# Patient Record
Sex: MALE | Race: BLACK OR AFRICAN AMERICAN | NOT HISPANIC OR LATINO | Employment: UNEMPLOYED | URBAN - METROPOLITAN AREA
[De-identification: names, ages, dates, MRNs, and addresses within clinical notes are randomized per-mention and may not be internally consistent; named-entity substitution may affect disease eponyms.]

---

## 2023-04-24 ENCOUNTER — APPOINTMENT (OUTPATIENT)
Dept: LAB | Facility: LAB | Age: 53
End: 2023-04-24
Payer: COMMERCIAL

## 2023-04-24 LAB
ALBUMIN (G/DL) IN SER/PLAS: 4.8 G/DL (ref 3.4–5)
ANION GAP IN SER/PLAS: 16 MMOL/L (ref 10–20)
CALCIUM (MG/DL) IN SER/PLAS: 9.6 MG/DL (ref 8.6–10.6)
CARBON DIOXIDE, TOTAL (MMOL/L) IN SER/PLAS: 29 MMOL/L (ref 21–32)
CHLORIDE (MMOL/L) IN SER/PLAS: 99 MMOL/L (ref 98–107)
CHOLESTEROL (MG/DL) IN SER/PLAS: 202 MG/DL (ref 0–199)
CHOLESTEROL IN HDL (MG/DL) IN SER/PLAS: 39 MG/DL
CHOLESTEROL/HDL RATIO: 5.2
CREATININE (MG/DL) IN SER/PLAS: 1.22 MG/DL (ref 0.5–1.3)
CREATININE (MG/DL) IN URINE: 190 MG/DL (ref 20–370)
ERYTHROCYTE DISTRIBUTION WIDTH (RATIO) BY AUTOMATED COUNT: 13.4 % (ref 11.5–14.5)
ERYTHROCYTE MEAN CORPUSCULAR HEMOGLOBIN CONCENTRATION (G/DL) BY AUTOMATED: 33.7 G/DL (ref 32–36)
ERYTHROCYTE MEAN CORPUSCULAR VOLUME (FL) BY AUTOMATED COUNT: 87 FL (ref 80–100)
ERYTHROCYTES (10*6/UL) IN BLOOD BY AUTOMATED COUNT: 5.49 X10E12/L (ref 4.5–5.9)
ESTIMATED AVERAGE GLUCOSE FOR HBA1C: 126 MG/DL
GFR MALE: 72 ML/MIN/1.73M2
GLUCOSE (MG/DL) IN SER/PLAS: 87 MG/DL (ref 74–99)
HEMATOCRIT (%) IN BLOOD BY AUTOMATED COUNT: 47.5 % (ref 41–52)
HEMOGLOBIN (G/DL) IN BLOOD: 16 G/DL (ref 13.5–17.5)
HEMOGLOBIN A1C/HEMOGLOBIN TOTAL IN BLOOD: 6 %
LEUKOCYTES (10*3/UL) IN BLOOD BY AUTOMATED COUNT: 4.9 X10E9/L (ref 4.4–11.3)
NON-HDL CHOLESTEROL: 163 MG/DL
NRBC (PER 100 WBCS) BY AUTOMATED COUNT: 0 /100 WBC (ref 0–0)
PHOSPHATE (MG/DL) IN SER/PLAS: 3.7 MG/DL (ref 2.5–4.9)
PLATELETS (10*3/UL) IN BLOOD AUTOMATED COUNT: 246 X10E9/L (ref 150–450)
POTASSIUM (MMOL/L) IN SER/PLAS: 4.3 MMOL/L (ref 3.5–5.3)
PROTEIN (MG/DL) IN URINE: 17 MG/DL (ref 5–25)
PROTEIN/CREATININE (MG/MG) IN URINE: 0.09 MG/MG CREAT (ref 0–0.17)
SODIUM (MMOL/L) IN SER/PLAS: 140 MMOL/L (ref 136–145)
URATE (MG/DL) IN SER/PLAS: 9.4 MG/DL (ref 4–7.5)
UREA NITROGEN (MG/DL) IN SER/PLAS: 20 MG/DL (ref 6–23)

## 2023-06-13 LAB
ALBUMIN (MG/L) IN URINE: 20.9 MG/L
ALBUMIN/CREATININE (UG/MG) IN URINE: 8 UG/MG CRT (ref 0–30)
CREATININE (MG/DL) IN URINE: 261 MG/DL (ref 20–370)

## 2023-07-10 ENCOUNTER — APPOINTMENT (OUTPATIENT)
Dept: LAB | Facility: LAB | Age: 53
End: 2023-07-10
Payer: COMMERCIAL

## 2023-07-10 LAB — URATE (MG/DL) IN SER/PLAS: 8.2 MG/DL (ref 4–7.5)

## 2023-08-02 ENCOUNTER — APPOINTMENT (OUTPATIENT)
Dept: LAB | Facility: LAB | Age: 53
End: 2023-08-02
Payer: COMMERCIAL

## 2023-08-02 LAB — URATE (MG/DL) IN SER/PLAS: 7.2 MG/DL (ref 4–7.5)

## 2023-09-25 ENCOUNTER — LAB (OUTPATIENT)
Dept: LAB | Facility: LAB | Age: 53
End: 2023-09-25
Payer: COMMERCIAL

## 2023-09-25 LAB — URATE (MG/DL) IN SER/PLAS: 8.4 MG/DL (ref 4–7.5)

## 2023-09-26 PROBLEM — R07.89 ACUTE CHEST WALL PAIN: Status: ACTIVE | Noted: 2023-09-26

## 2023-09-26 PROBLEM — R05.3 CHRONIC COUGH: Status: ACTIVE | Noted: 2023-09-26

## 2023-09-26 PROBLEM — K64.8 BLEEDING INTERNAL HEMORRHOIDS: Status: ACTIVE | Noted: 2023-09-26

## 2023-09-26 PROBLEM — R89.9 ABNORMAL LABORATORY TEST: Status: ACTIVE | Noted: 2023-09-26

## 2023-09-26 PROBLEM — H90.3 SENSORINEURAL HEARING LOSS, BILATERAL: Status: ACTIVE | Noted: 2023-09-26

## 2023-09-26 PROBLEM — K22.4 ESOPHAGEAL DYSFUNCTION: Status: ACTIVE | Noted: 2023-09-26

## 2023-09-26 PROBLEM — G89.29 CHRONIC MUSCULOSKELETAL PAIN: Status: ACTIVE | Noted: 2023-09-26

## 2023-09-26 PROBLEM — R20.0 NUMBNESS IN FEET: Status: ACTIVE | Noted: 2023-09-26

## 2023-09-26 PROBLEM — G47.9 SLEEPING DIFFICULTY: Status: ACTIVE | Noted: 2023-09-26

## 2023-09-26 PROBLEM — M79.675 PAIN IN TOES OF BOTH FEET: Status: ACTIVE | Noted: 2023-09-26

## 2023-09-26 PROBLEM — E66.811 OBESITY, CLASS I, BMI 30-34.9: Status: ACTIVE | Noted: 2018-11-05

## 2023-09-26 PROBLEM — A64 STI (SEXUALLY TRANSMITTED INFECTION): Status: ACTIVE | Noted: 2023-09-26

## 2023-09-26 PROBLEM — R59.0 MEDIASTINAL ADENOPATHY: Status: ACTIVE | Noted: 2023-09-26

## 2023-09-26 PROBLEM — S76.212A STRAIN OF LEFT GROIN: Status: ACTIVE | Noted: 2023-09-26

## 2023-09-26 PROBLEM — R51.9 HEADACHE: Status: ACTIVE | Noted: 2023-09-26

## 2023-09-26 PROBLEM — E11.49 TYPE II DIABETES MELLITUS WITH NEUROLOGICAL MANIFESTATIONS (MULTI): Status: ACTIVE | Noted: 2020-09-18

## 2023-09-26 PROBLEM — M25.569 JOINT PAIN, KNEE: Status: ACTIVE | Noted: 2017-09-23

## 2023-09-26 PROBLEM — R06.02 SHORTNESS OF BREATH AT REST: Status: ACTIVE | Noted: 2017-09-23

## 2023-09-26 PROBLEM — R60.0 BILATERAL LEG EDEMA: Status: ACTIVE | Noted: 2023-09-26

## 2023-09-26 PROBLEM — K64.9 HEMORRHOIDS: Status: ACTIVE | Noted: 2023-09-26

## 2023-09-26 PROBLEM — F45.1 SOMATIC SYMPTOM DISORDER: Status: ACTIVE | Noted: 2023-09-26

## 2023-09-26 PROBLEM — J45.909 ASTHMA (HHS-HCC): Status: ACTIVE | Noted: 2021-07-09

## 2023-09-26 PROBLEM — Z59.41 FOOD INSECURITY: Status: ACTIVE | Noted: 2023-09-26

## 2023-09-26 PROBLEM — K62.5 BRBPR (BRIGHT RED BLOOD PER RECTUM): Status: ACTIVE | Noted: 2023-09-26

## 2023-09-26 PROBLEM — R94.128 ABNORMAL TYMPANOGRAM: Status: ACTIVE | Noted: 2023-09-26

## 2023-09-26 PROBLEM — R68.81 EARLY SATIETY: Status: ACTIVE | Noted: 2023-09-26

## 2023-09-26 PROBLEM — F43.20 ADJUSTMENT DISORDER: Status: ACTIVE | Noted: 2023-09-26

## 2023-09-26 PROBLEM — G47.19 EXCESSIVE DAYTIME SLEEPINESS: Status: ACTIVE | Noted: 2023-09-26

## 2023-09-26 PROBLEM — M54.9 BACK PAIN: Status: ACTIVE | Noted: 2023-09-26

## 2023-09-26 PROBLEM — R06.09 DYSPNEA ON EXERTION: Status: ACTIVE | Noted: 2023-09-26

## 2023-09-26 PROBLEM — R93.89 ABNORMAL CT OF THE CHEST: Status: ACTIVE | Noted: 2023-09-26

## 2023-09-26 PROBLEM — R73.09 ELEVATED HEMOGLOBIN A1C: Status: ACTIVE | Noted: 2023-09-26

## 2023-09-26 PROBLEM — R56.9 SEIZURE (MULTI): Status: ACTIVE | Noted: 2023-09-26

## 2023-09-26 PROBLEM — R10.30 UNILATERAL GROIN PAIN: Status: ACTIVE | Noted: 2023-09-26

## 2023-09-26 PROBLEM — K59.00 CONSTIPATION: Status: ACTIVE | Noted: 2023-09-26

## 2023-09-26 PROBLEM — M10.9 GOUT OF RIGHT FOOT: Status: ACTIVE | Noted: 2023-09-26

## 2023-09-26 PROBLEM — H35.412 LATTICE DEGENERATION OF LEFT RETINA: Status: ACTIVE | Noted: 2023-09-26

## 2023-09-26 PROBLEM — R10.32 BILATERAL GROIN PAIN: Status: ACTIVE | Noted: 2023-09-26

## 2023-09-26 PROBLEM — H61.899 DEBRIS IN EAR CANAL: Status: ACTIVE | Noted: 2023-09-26

## 2023-09-26 PROBLEM — G47.00 INSOMNIA: Status: ACTIVE | Noted: 2023-09-26

## 2023-09-26 PROBLEM — M70.62 GREATER TROCHANTERIC BURSITIS, LEFT: Status: ACTIVE | Noted: 2023-09-26

## 2023-09-26 PROBLEM — E66.9 OBESITY, CLASS I, BMI 30-34.9: Status: ACTIVE | Noted: 2018-11-05

## 2023-09-26 PROBLEM — M10.9 GOUT ATTACK: Status: ACTIVE | Noted: 2020-09-18

## 2023-09-26 PROBLEM — M25.562 BILATERAL KNEE PAIN: Status: ACTIVE | Noted: 2023-09-26

## 2023-09-26 PROBLEM — F39 MOOD DISORDER (CMS-HCC): Status: ACTIVE | Noted: 2023-09-26

## 2023-09-26 PROBLEM — R07.89 ATYPICAL CHEST PAIN: Status: ACTIVE | Noted: 2018-06-25

## 2023-09-26 PROBLEM — R91.8 LUNG NODULES: Status: ACTIVE | Noted: 2023-09-26

## 2023-09-26 PROBLEM — F41.8 ANXIETY WITH SOMATIC FEATURES: Status: ACTIVE | Noted: 2021-07-09

## 2023-09-26 PROBLEM — M25.551 RIGHT HIP PAIN: Status: ACTIVE | Noted: 2023-09-26

## 2023-09-26 PROBLEM — J38.3 VOCAL CORD DYSFUNCTION: Status: ACTIVE | Noted: 2023-09-26

## 2023-09-26 PROBLEM — M94.0 COSTOCHONDRITIS: Status: ACTIVE | Noted: 2023-09-26

## 2023-09-26 PROBLEM — R94.31 ABNORMAL EKG: Status: ACTIVE | Noted: 2023-09-26

## 2023-09-26 PROBLEM — R07.89 CHEST PRESSURE: Status: ACTIVE | Noted: 2023-09-26

## 2023-09-26 PROBLEM — I10 HYPERTENSION, ESSENTIAL, BENIGN: Status: ACTIVE | Noted: 2017-09-23

## 2023-09-26 PROBLEM — R86.9 SEMEN ABNORMAL: Status: ACTIVE | Noted: 2023-09-26

## 2023-09-26 PROBLEM — B35.1 DERMATOPHYTOSIS OF NAIL: Status: ACTIVE | Noted: 2020-09-18

## 2023-09-26 PROBLEM — M19.079 1ST MTP ARTHRITIS: Status: ACTIVE | Noted: 2023-09-26

## 2023-09-26 PROBLEM — G47.33 OSA ON CPAP: Status: ACTIVE | Noted: 2017-09-23

## 2023-09-26 PROBLEM — M54.50 ACUTE BILATERAL LOW BACK PAIN: Status: ACTIVE | Noted: 2022-04-01

## 2023-09-26 PROBLEM — S62.339A FRACTURE, METACARPAL, NECK: Status: ACTIVE | Noted: 2023-09-26

## 2023-09-26 PROBLEM — R53.83 FATIGUE: Status: ACTIVE | Noted: 2023-09-26

## 2023-09-26 PROBLEM — R56.9 CONVULSION, NON-EPILEPTIC (MULTI): Status: ACTIVE | Noted: 2023-09-26

## 2023-09-26 PROBLEM — I25.2 OLD MYOCARDIAL INFARCTION: Status: ACTIVE | Noted: 2019-07-12

## 2023-09-26 PROBLEM — M25.571 RIGHT ANKLE PAIN: Status: ACTIVE | Noted: 2022-08-22

## 2023-09-26 PROBLEM — K62.89 RECTAL PAIN: Status: ACTIVE | Noted: 2023-09-26

## 2023-09-26 PROBLEM — R52 ACHING: Status: ACTIVE | Noted: 2021-03-05

## 2023-09-26 PROBLEM — R13.13 DYSPHAGIA, CRICOPHARYNGEAL: Status: ACTIVE | Noted: 2023-09-26
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
## 2023-09-26 PROBLEM — N52.9 ERECTILE DYSFUNCTION: Status: ACTIVE | Noted: 2023-09-26

## 2023-09-26 PROBLEM — M25.561 BILATERAL KNEE PAIN: Status: ACTIVE | Noted: 2023-09-26

## 2023-09-26 PROBLEM — R73.9 HYPERGLYCEMIA: Status: ACTIVE | Noted: 2023-09-26

## 2023-09-26 PROBLEM — J38.3 SULCUS VOCALIS OF VOCAL CORD: Status: ACTIVE | Noted: 2023-09-26

## 2023-09-26 PROBLEM — M79.18 CHRONIC MUSCULOSKELETAL PAIN: Status: ACTIVE | Noted: 2023-09-26

## 2023-09-26 PROBLEM — R10.31 BILATERAL GROIN PAIN: Status: ACTIVE | Noted: 2023-09-26

## 2023-09-26 PROBLEM — M75.82 TENDINITIS OF LEFT ROTATOR CUFF: Status: ACTIVE | Noted: 2023-09-26

## 2023-09-26 PROBLEM — F32.A ACUTE DEPRESSION: Status: ACTIVE | Noted: 2021-07-09

## 2023-09-26 PROBLEM — R49.0 DYSPHONIA: Status: ACTIVE | Noted: 2023-09-26

## 2023-09-26 PROBLEM — H53.9 VISUAL CHANGES: Status: ACTIVE | Noted: 2023-09-26

## 2023-09-26 PROBLEM — K29.70 GASTRITIS: Status: ACTIVE | Noted: 2023-09-26

## 2023-09-26 PROBLEM — R60.0 LOCALIZED EDEMA: Status: ACTIVE | Noted: 2020-09-18

## 2023-09-26 PROBLEM — M79.673 FOOT PAIN: Status: ACTIVE | Noted: 2023-09-26

## 2023-09-26 PROBLEM — S39.012A ACUTE LUMBAR MYOFASCIAL STRAIN: Status: ACTIVE | Noted: 2023-09-26

## 2023-09-26 PROBLEM — K64.8 INTERNAL HEMORRHOID: Status: ACTIVE | Noted: 2023-09-26

## 2023-09-26 PROBLEM — R60.9 EDEMA: Status: ACTIVE | Noted: 2023-09-26

## 2023-09-26 PROBLEM — B35.1 ONYCHOMYCOSIS: Status: ACTIVE | Noted: 2023-09-26

## 2023-09-26 PROBLEM — D12.6 TUBULAR ADENOMA OF COLON: Status: ACTIVE | Noted: 2023-09-26

## 2023-09-26 PROBLEM — S90.212A CONTUSION OF LEFT GREAT TOE WITH DAMAGE TO NAIL: Status: ACTIVE | Noted: 2020-09-18

## 2023-09-26 PROBLEM — I25.10 ATHEROSCLEROSIS OF CORONARY ARTERY: Status: ACTIVE | Noted: 2022-01-13

## 2023-09-26 PROBLEM — M79.674 PAIN IN TOES OF BOTH FEET: Status: ACTIVE | Noted: 2023-09-26

## 2023-09-26 PROBLEM — U07.1 LAB TEST POSITIVE FOR DETECTION OF COVID-19 VIRUS: Status: ACTIVE | Noted: 2023-09-26

## 2023-09-26 RX ORDER — BROMPHENIRAMINE MALEATE, DEXTROMETHORPHAN HBR, PHENYLEPHRINE HCL, DIPHENHYDRAMINE HCL, PHENYLEPHRINE HCL 0.52G
KIT ORAL DAILY
COMMUNITY
Start: 2019-08-15 | End: 2024-01-04 | Stop reason: WASHOUT

## 2023-09-26 RX ORDER — NAPROXEN 500 MG/1
TABLET ORAL EVERY 12 HOURS
COMMUNITY
Start: 2020-02-05 | End: 2024-05-28

## 2023-09-26 RX ORDER — MELATONIN 3 MG
1 CAPSULE ORAL NIGHTLY PRN
COMMUNITY
Start: 2019-08-19

## 2023-09-26 RX ORDER — AMLODIPINE BESYLATE 10 MG/1
1 TABLET ORAL DAILY
COMMUNITY
Start: 2022-01-12

## 2023-09-26 RX ORDER — MELOXICAM 7.5 MG/1
7.5 TABLET ORAL 2 TIMES DAILY
COMMUNITY
Start: 2023-09-14 | End: 2023-10-16 | Stop reason: SDUPTHER

## 2023-09-26 RX ORDER — BUDESONIDE AND FORMOTEROL FUMARATE DIHYDRATE 80; 4.5 UG/1; UG/1
2 AEROSOL RESPIRATORY (INHALATION)
COMMUNITY
End: 2024-01-04 | Stop reason: SDUPTHER

## 2023-09-26 RX ORDER — HYDROCHLOROTHIAZIDE 25 MG/1
1 TABLET ORAL DAILY
COMMUNITY
Start: 2007-03-13

## 2023-09-26 RX ORDER — DOCUSATE SODIUM 100 MG/1
100 CAPSULE, LIQUID FILLED ORAL 2 TIMES DAILY
COMMUNITY
Start: 2007-03-30

## 2023-09-26 RX ORDER — TRAZODONE HYDROCHLORIDE 50 MG/1
50 TABLET ORAL NIGHTLY
COMMUNITY
End: 2024-01-04 | Stop reason: WASHOUT

## 2023-09-26 RX ORDER — PSYLLIUM SEED
PACKET (EA) ORAL DAILY
COMMUNITY
Start: 2007-03-30

## 2023-09-26 RX ORDER — DICLOFENAC SODIUM 10 MG/G
4 GEL TOPICAL 2 TIMES DAILY
COMMUNITY
End: 2024-04-01 | Stop reason: ALTCHOICE

## 2023-09-26 RX ORDER — ESCITALOPRAM OXALATE 10 MG/1
10 TABLET ORAL DAILY
COMMUNITY
Start: 2020-08-18

## 2023-09-26 RX ORDER — IBUPROFEN 800 MG/1
1 TABLET ORAL EVERY 6 HOURS
COMMUNITY
Start: 2022-03-26 | End: 2024-05-28

## 2023-09-26 RX ORDER — ACETAMINOPHEN 500 MG
1000 TABLET ORAL EVERY 6 HOURS
COMMUNITY
Start: 2022-03-26

## 2023-09-26 RX ORDER — LISINOPRIL AND HYDROCHLOROTHIAZIDE 10; 12.5 MG/1; MG/1
1 TABLET ORAL DAILY
COMMUNITY
Start: 2022-06-06

## 2023-09-26 RX ORDER — TOPIRAMATE 25 MG/1
25 TABLET ORAL DAILY
COMMUNITY
Start: 2019-10-22 | End: 2024-01-04 | Stop reason: WASHOUT

## 2023-09-26 RX ORDER — BLOOD SUGAR DIAGNOSTIC
STRIP MISCELLANEOUS 2 TIMES DAILY
COMMUNITY
Start: 2022-02-22

## 2023-09-26 RX ORDER — IBUPROFEN 600 MG/1
600 TABLET ORAL EVERY 8 HOURS PRN
COMMUNITY
Start: 2021-04-06

## 2023-09-26 RX ORDER — SEMAGLUTIDE 0.68 MG/ML
0.25 INJECTION, SOLUTION SUBCUTANEOUS
COMMUNITY

## 2023-09-26 RX ORDER — LANCETS 33 GAUGE
EACH MISCELLANEOUS 3 TIMES DAILY
COMMUNITY
Start: 2023-09-14

## 2023-09-26 RX ORDER — ALBUTEROL SULFATE 90 UG/1
1 AEROSOL, METERED RESPIRATORY (INHALATION) EVERY 6 HOURS PRN
COMMUNITY
End: 2024-01-04 | Stop reason: SDUPTHER

## 2023-09-26 RX ORDER — METFORMIN HYDROCHLORIDE 500 MG/1
500 TABLET ORAL
COMMUNITY

## 2023-09-26 RX ORDER — DULAGLUTIDE 0.75 MG/.5ML
1 INJECTION, SOLUTION SUBCUTANEOUS
COMMUNITY
Start: 2023-08-31

## 2023-09-26 RX ORDER — DEXTROMETHORPHAN HYDROBROMIDE, GUAIFENESIN 5; 100 MG/5ML; MG/5ML
650 LIQUID ORAL 3 TIMES DAILY PRN
COMMUNITY
Start: 2022-02-04

## 2023-09-26 RX ORDER — MECLIZINE HYDROCHLORIDE 25 MG/1
25 TABLET ORAL 3 TIMES DAILY PRN
COMMUNITY
Start: 2006-11-10

## 2023-09-26 RX ORDER — LIDOCAINE 40 MG/G
CREAM TOPICAL 3 TIMES DAILY PRN
COMMUNITY
Start: 2020-06-15 | End: 2024-01-04 | Stop reason: SINTOL

## 2023-09-26 RX ORDER — POLYETHYLENE GLYCOL 3350 17 G/17G
17 POWDER, FOR SOLUTION ORAL DAILY
COMMUNITY
Start: 2022-04-29

## 2023-09-26 RX ORDER — CLOTRIMAZOLE 1 %
1 CREAM (GRAM) TOPICAL 2 TIMES DAILY
COMMUNITY
Start: 2018-02-13

## 2023-09-26 RX ORDER — ALLOPURINOL 300 MG/1
300 TABLET ORAL DAILY
COMMUNITY
Start: 2023-09-14 | End: 2024-03-15 | Stop reason: SDUPTHER

## 2023-09-26 RX ORDER — HYDROCORTISONE 25 MG/ML
LOTION TOPICAL 2 TIMES DAILY
COMMUNITY
Start: 2006-06-22

## 2023-09-26 RX ORDER — LANCETS
EACH MISCELLANEOUS 2 TIMES DAILY
COMMUNITY
Start: 2023-02-10

## 2023-09-26 RX ORDER — CICLOPIROX 80 MG/ML
SOLUTION TOPICAL
COMMUNITY
Start: 2021-02-02

## 2023-09-26 RX ORDER — CLINDAMYCIN HYDROCHLORIDE 300 MG/1
300 CAPSULE ORAL 3 TIMES DAILY
COMMUNITY
Start: 2020-09-18

## 2023-09-26 RX ORDER — ISOPROPYL ALCOHOL 70 ML/100ML
SWAB TOPICAL 3 TIMES DAILY
COMMUNITY
Start: 2023-09-08

## 2023-09-26 RX ORDER — ALLOPURINOL 100 MG/1
1 TABLET ORAL DAILY
COMMUNITY
Start: 2021-01-21 | End: 2024-03-15 | Stop reason: SDUPTHER

## 2023-09-26 RX ORDER — TRAZODONE HYDROCHLORIDE 100 MG/1
TABLET ORAL
COMMUNITY
Start: 2023-09-08 | End: 2024-01-04 | Stop reason: WASHOUT

## 2023-09-26 RX ORDER — BLOOD-GLUCOSE METER
KIT MISCELLANEOUS DAILY PRN
COMMUNITY
Start: 2021-07-26

## 2023-09-26 RX ORDER — AMLODIPINE BESYLATE 5 MG/1
5 TABLET ORAL DAILY
COMMUNITY

## 2023-09-26 RX ORDER — ATORVASTATIN CALCIUM 40 MG/1
1 TABLET, FILM COATED ORAL NIGHTLY
COMMUNITY
Start: 2012-12-24 | End: 2024-01-04 | Stop reason: SDUPTHER

## 2023-09-26 RX ORDER — COLCHICINE 0.6 MG/1
0.6 TABLET ORAL DAILY
COMMUNITY
Start: 2020-09-18

## 2023-09-26 RX ORDER — PANTOPRAZOLE SODIUM 40 MG/1
40 TABLET, DELAYED RELEASE ORAL DAILY
COMMUNITY
Start: 2019-01-29 | End: 2024-01-04 | Stop reason: SDUPTHER

## 2023-09-26 RX ORDER — CALCIUM CARBONATE 400(1000)
1 TABLET,CHEWABLE ORAL EVERY 2 HOUR PRN
COMMUNITY
Start: 2014-06-21

## 2023-09-26 RX ORDER — HYDROCHLOROTHIAZIDE 12.5 MG/1
12.5 TABLET ORAL DAILY
COMMUNITY

## 2023-10-02 ENCOUNTER — APPOINTMENT (OUTPATIENT)
Dept: PRIMARY CARE | Facility: CLINIC | Age: 53
End: 2023-10-02
Payer: COMMERCIAL

## 2023-10-09 ENCOUNTER — OFFICE VISIT (OUTPATIENT)
Dept: PODIATRY | Facility: CLINIC | Age: 53
End: 2023-10-09
Payer: COMMERCIAL

## 2023-10-09 DIAGNOSIS — R60.0 BILATERAL LEG EDEMA: Primary | ICD-10-CM

## 2023-10-09 DIAGNOSIS — R20.0 NUMBNESS IN FEET: ICD-10-CM

## 2023-10-09 DIAGNOSIS — M19.079 1ST MTP ARTHRITIS: ICD-10-CM

## 2023-10-09 DIAGNOSIS — E11.49 TYPE II DIABETES MELLITUS WITH NEUROLOGICAL MANIFESTATIONS (MULTI): ICD-10-CM

## 2023-10-09 DIAGNOSIS — M79.672 PAIN IN BOTH FEET: ICD-10-CM

## 2023-10-09 DIAGNOSIS — M79.671 PAIN IN BOTH FEET: ICD-10-CM

## 2023-10-09 PROCEDURE — 3044F HG A1C LEVEL LT 7.0%: CPT | Performed by: PODIATRIST

## 2023-10-09 PROCEDURE — 99213 OFFICE O/P EST LOW 20 MIN: CPT | Performed by: PODIATRIST

## 2023-10-09 PROCEDURE — 4010F ACE/ARB THERAPY RXD/TAKEN: CPT | Performed by: PODIATRIST

## 2023-10-09 NOTE — PROGRESS NOTES
This is a 53 y.o. male est patient for foot exam    History of Present Illness:   Patient states they are here for Dm exam  Denies NTB to feet  Most recent A1C is 6.0  Needs new script for DM shoes  Unable to safely trim nails on own      Past Medical History  Past Medical History:   Diagnosis Date    Cough, unspecified 03/22/2017    Cough    Myalgia, other site 12/19/2022    Chronic musculoskeletal pain    Other disorders of lung     Lung trouble    Personal history of other diseases of the circulatory system     History of coronary artery disease    Personal history of other diseases of the circulatory system     History of hypertension    Personal history of other diseases of the nervous system and sense organs     History of sleep apnea    Personal history of other specified conditions     History of chest pain    Personal history of other specified conditions     History of heartburn    Unspecified convulsions (CMS/HCC) 11/14/2022    Seizure       Medications and Allergies have been reviewed.    Review Of Systems:  GENERAL: No weight loss, malaise or fevers.  HEENT: Negative for frequent or significant headaches,   RESPIRATORY: Negative for cough, wheezing or shortness of breath.  CARDIOVASCULAR: Negative for chest pain, leg swelling or palpitations.    Physical Exam:  Patient is a pleasant, cooperative, well developed 53 y.o.  adult male. The patient is alert and oriented to time, place and person.   Patient has normal affect and mood.    Examination of Both Lower Extremities:   Objective:   Vasc: DP and PT pulses are palpable bilateral.  CFT is less than 3 seconds bilateral.  Skin temperature is warm to cool proximal to distal bilateral.      Neuro: Vibratory, light touch and proprioception are intact bilateral.  Protective sensation is intact to the foot when tested with the 5.07 SWM bilateral.     Derm: Nails 1-5 bilateral are intact thick and discolored.  Skin is supple with normal texture and turgor  noted.  Webspaces are clean, dry and intact bilateral.      Ortho: Muscle strength is 5/5 for all pedal groups tested.        A comprehensive history and physical exam was performed. The patient was educated on clinical and radiographic findings, diagnosis, and treatment plans.    1. Bilateral leg edema  Diabetic Shoe For Density      2. Numbness in feet  Diabetic Shoe For Density      3. 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 Type II diabetes mellitus with neurological manifestations (CMS/[facility])  Diabetic Shoe For Density            Patient educated on proper diabetic foot care.  Nails 1-5 b/l were debrided in thickness and length with nail cutting forceps.  A1C revd. 6.0  Gave rx for DM shoes and inserts, dispensed list of vendors  Patient to follow up in 6 mos or sooner if any problems arise.   Patient was in agreement to this plan. All questions answered.      Jannette Birch DPM  [phone]  Option 2  Fax: [phone]

## 2023-10-16 ENCOUNTER — HOSPITAL ENCOUNTER (OUTPATIENT)
Dept: RADIOLOGY | Facility: HOSPITAL | Age: 53
Discharge: HOME | End: 2023-10-16
Payer: COMMERCIAL

## 2023-10-16 ENCOUNTER — OFFICE VISIT (OUTPATIENT)
Dept: RHEUMATOLOGY | Facility: CLINIC | Age: 53
End: 2023-10-16
Payer: COMMERCIAL

## 2023-10-16 ENCOUNTER — LAB (OUTPATIENT)
Dept: LAB | Facility: LAB | Age: 53
End: 2023-10-16
Payer: COMMERCIAL

## 2023-10-16 VITALS
SYSTOLIC BLOOD PRESSURE: 118 MMHG | WEIGHT: 226 LBS | HEIGHT: 71 IN | TEMPERATURE: 98.3 F | HEART RATE: 80 BPM | BODY MASS INDEX: 31.64 KG/M2 | DIASTOLIC BLOOD PRESSURE: 82 MMHG

## 2023-10-16 DIAGNOSIS — M1A.9XX0 CHRONIC GOUT WITHOUT TOPHUS, UNSPECIFIED CAUSE, UNSPECIFIED SITE: ICD-10-CM

## 2023-10-16 DIAGNOSIS — M1A.9XX0 CHRONIC GOUT WITHOUT TOPHUS, UNSPECIFIED CAUSE, UNSPECIFIED SITE: Primary | ICD-10-CM

## 2023-10-16 LAB — URATE SERPL-MCNC: 5.1 MG/DL (ref 4–7.5)

## 2023-10-16 PROCEDURE — 36415 COLL VENOUS BLD VENIPUNCTURE: CPT

## 2023-10-16 PROCEDURE — 73030 X-RAY EXAM OF SHOULDER: CPT | Mod: LEFT SIDE | Performed by: RADIOLOGY

## 2023-10-16 PROCEDURE — 3079F DIAST BP 80-89 MM HG: CPT | Performed by: INTERNAL MEDICINE

## 2023-10-16 PROCEDURE — 1036F TOBACCO NON-USER: CPT | Performed by: INTERNAL MEDICINE

## 2023-10-16 PROCEDURE — 4010F ACE/ARB THERAPY RXD/TAKEN: CPT | Performed by: INTERNAL MEDICINE

## 2023-10-16 PROCEDURE — 3044F HG A1C LEVEL LT 7.0%: CPT | Performed by: INTERNAL MEDICINE

## 2023-10-16 PROCEDURE — 3074F SYST BP LT 130 MM HG: CPT | Performed by: INTERNAL MEDICINE

## 2023-10-16 PROCEDURE — 73030 X-RAY EXAM OF SHOULDER: CPT | Mod: LT

## 2023-10-16 PROCEDURE — 84550 ASSAY OF BLOOD/URIC ACID: CPT

## 2023-10-16 PROCEDURE — 99214 OFFICE O/P EST MOD 30 MIN: CPT | Performed by: INTERNAL MEDICINE

## 2023-10-16 RX ORDER — MELOXICAM 7.5 MG/1
7.5 TABLET ORAL DAILY
Qty: 14 TABLET | Refills: 0 | Status: SHIPPED | OUTPATIENT
Start: 2023-10-16 | End: 2023-10-30

## 2023-10-16 ASSESSMENT — PAIN SCALES - GENERAL: PAINLEVEL: 7

## 2023-10-16 NOTE — PROGRESS NOTES
Rheumatology Office Visit      Subjective     Patient ID: [id number]     Tip Lynn is a 53 y.o. male who presents for Follow-up (Shoulder pain).    Subjective    52 year old with metabolic syndrome,   Gout, left Rotator cuff impingement and Left Hip TAYLOR presents for follow up     Current meds  Allopurinol 400 mg daily from  on 9/14/2023    RECALL     Mentions his joints have been bothering him since the start of this year.  does not recall any inciting event, has more pain toward the end of the day, mainly in the knees and the right gluteal region.  Does not notice any swelling of the joints or redness.  Pain gets worse with activity, at times laying to his right side also hurts him.  Denies any recent trauma but had trauma to his ankle from a fall many years ago and required internal fixation.  Uses ibuprofen once a day on an average that provides some relief.  Allopurinol started by PCP but no gout episodes. Has high UA levels.  Has been trying to be more active, recently joined a gym, trying to lose weight.     No fevers, chills, sweating, Skin rash, Eye redness, Raynaud's, Skin tightening, Oral or Genital Ulcers  Does not have inflammatory back pain.  No diagnosis of IBD, Uveitis, Psoriasis  No family history of autoimmune issues     Previously has HTN and CARLOS with Pre DM  Prescribed CPAP but thinks it is not working  has daytime somnolence and fatigue         Interval History  worsening of left shoulder pain again , overhead movements and   laying on left shoulder is very painful, unfortunately fell while walking last week and hit his left shoulder, pain has worsened since then, but no bruising or swelling  S/p CSI in July helped for about a month  not on regular NSAIDs, has borderline renal parematers   No further gout flares  Adherent to 400mg daily and dietary modifications     Review of Systems  Constitutional: Denies fever, chills  Eyes: Denies dry eyes, redness of the eyes, pain in the eyes   ENT:  "Denies dry mouth, sores in the mouth, poor dentition   Cardiovascular: Denies chest pain, lower extremity edema  Respiratory: Denies shortness of breath, cough, hemoptysis  Gastrointestinal: Denies abdominal pain, N/V/D, dysphagia, odynophagia, heartburn   Integumentary: Denies rash, photosensitivity, nail changes, alopecia  Neurological: Denies any numbness, tingling, focal weakness   Hematologic/Lymphatic: Denies bleeding, easy bruising, Raynaud's phenomena   MSK: As per HPI       Objective  Vitals:    10/16/23 1018   BP: 118/82   Pulse: 80   Temp: 36.8 °C (98.3 °F)   Weight: 103 kg (226 lb)   Height: 1.803 m (5' 11\")       Physical Exam  General: Cooperative, in NAD   Eyes: Conjunctiva clear, sclera white, anicteric   Nose: No external deformity, no mucosal crusting or signs of bleeding   Throat/Mouth: Moist mucous membranes, normal dentition, no ulcers or lesions   Lungs: No respiratory distress; lungs CTAB; no wheezes, rales, rhonchi, or stridor   Heart: Normal S1 and S2; regular rate and rhythm; no murmurs, rubs, or gallops  Skin: No rashes, ulcers, tophi, or nodules noted  MSK:   Spine: Normal posture, no point tenderness to palpation, normal ROM  Upper Extremities:   Hands: No erythema, warmth, synovitis, or pain of the DIPs, PIPs, or MCPs; normal ROM  Wrists: No erythema, warmth, synovitis, or pain of the wrists; normal ROM  Elbows: No erythema, warmth, synovitis, or pain; normal ROM   Shoulders: no subacromial tenderness, some tenderness over the ant aspect over the bicipital groove, Left shoulder limited active abudction and flexion to 110 degress  With passive abduction extreme pain beyond 120  Bermudez / Neers positive  Speed Positive  Lift off +ve    Lower Extremities:   Hips: No gross deformity, erythema, warmth, or pain; normal ROM   Knees: No erythema, warmth, swelling, or pain; normal ROM   Ankles: No erythema, warmth, synovitis, or pain; normal ROM  Feet: No gross deformity, erythema, or swelling; " MTP squeeze negative bilaterally    Last BMP:   Lab Results   Component Value Date     09/04/2023    K 4.2 09/04/2023     09/04/2023    CO2 27 09/04/2023    BUN 15 09/04/2023    CREATININE 1.29 09/04/2023    CALCIUM 9.5 09/04/2023     Lab Results   Component Value Date    URICACID 8.4 (H) 09/25/2023       Assessment/Plan   Diagnoses and all orders for this visit:  Chronic gout without tophus, unspecified cause, unspecified site  Now only on ULT without prophylaxis  UA not on target  Increase allopurinol to 500mg daily  -     Uric Acid; Future    -Left shoulder RC tendinitis with impingement  -  XR shoulder left 2+ views; Future given hx of recent fall  - Referral for PT  -     Referral to Physical Therapy; Future  -     meloxicam (Mobic) 7.5 mg tablet; Take 1 tablet (7.5 mg) by mouth once daily for 14 days. For pain      Follow-up 3 months    Patient seen and discussed with attending Dr. Marcano      Rheumatology Attending     I interviewed and examined the patient and discussed the treatment plan. See above note for details.  I agree with the findings and plan of care summarized in the above note.      Christiano Marcano M.D.

## 2023-10-16 NOTE — PATIENT INSTRUCTIONS
Recommend increasing allopurinol to 500 mg once daily    - Please repeat uric acid and Xray of left shoulder  - I have placed another referral to Physical therapy      I will see you in 3 months

## 2023-11-02 ENCOUNTER — APPOINTMENT (OUTPATIENT)
Dept: PRIMARY CARE | Facility: CLINIC | Age: 53
End: 2023-11-02
Payer: COMMERCIAL

## 2024-01-03 ENCOUNTER — APPOINTMENT (OUTPATIENT)
Dept: DERMATOLOGY | Facility: CLINIC | Age: 54
End: 2024-01-03
Payer: COMMERCIAL

## 2024-01-04 ENCOUNTER — OFFICE VISIT (OUTPATIENT)
Dept: PRIMARY CARE | Facility: CLINIC | Age: 54
End: 2024-01-04
Payer: COMMERCIAL

## 2024-01-04 VITALS
DIASTOLIC BLOOD PRESSURE: 77 MMHG | WEIGHT: 235 LBS | HEART RATE: 71 BPM | TEMPERATURE: 96 F | HEIGHT: 70 IN | SYSTOLIC BLOOD PRESSURE: 123 MMHG | BODY MASS INDEX: 33.64 KG/M2 | OXYGEN SATURATION: 98 %

## 2024-01-04 DIAGNOSIS — J45.40 MODERATE PERSISTENT ASTHMA WITHOUT COMPLICATION (HHS-HCC): Primary | ICD-10-CM

## 2024-01-04 DIAGNOSIS — M25.819 IMPINGEMENT OF SHOULDER: ICD-10-CM

## 2024-01-04 DIAGNOSIS — K21.9 GASTROESOPHAGEAL REFLUX DISEASE, UNSPECIFIED WHETHER ESOPHAGITIS PRESENT: ICD-10-CM

## 2024-01-04 DIAGNOSIS — E11.9 TYPE 2 DIABETES MELLITUS WITHOUT COMPLICATION, WITHOUT LONG-TERM CURRENT USE OF INSULIN (MULTI): ICD-10-CM

## 2024-01-04 PROCEDURE — 1036F TOBACCO NON-USER: CPT

## 2024-01-04 PROCEDURE — 4010F ACE/ARB THERAPY RXD/TAKEN: CPT

## 2024-01-04 PROCEDURE — 99213 OFFICE O/P EST LOW 20 MIN: CPT

## 2024-01-04 PROCEDURE — 3074F SYST BP LT 130 MM HG: CPT

## 2024-01-04 PROCEDURE — 3078F DIAST BP <80 MM HG: CPT

## 2024-01-04 PROCEDURE — RXMED WILLOW AMBULATORY MEDICATION CHARGE

## 2024-01-04 RX ORDER — BUDESONIDE AND FORMOTEROL FUMARATE DIHYDRATE 80; 4.5 UG/1; UG/1
2 AEROSOL RESPIRATORY (INHALATION)
Qty: 10.2 G | Refills: 2 | Status: SHIPPED | OUTPATIENT
Start: 2024-01-04 | End: 2024-03-18 | Stop reason: SDUPTHER

## 2024-01-04 RX ORDER — PANTOPRAZOLE SODIUM 40 MG/1
40 TABLET, DELAYED RELEASE ORAL DAILY
Qty: 30 TABLET | Refills: 1 | Status: SHIPPED | OUTPATIENT
Start: 2024-01-04 | End: 2024-04-16

## 2024-01-04 RX ORDER — ATORVASTATIN CALCIUM 40 MG/1
40 TABLET, FILM COATED ORAL NIGHTLY
Qty: 90 TABLET | Refills: 3 | Status: SHIPPED | OUTPATIENT
Start: 2024-01-04 | End: 2025-01-03

## 2024-01-04 RX ORDER — ALBUTEROL SULFATE 90 UG/1
1 AEROSOL, METERED RESPIRATORY (INHALATION) EVERY 6 HOURS PRN
Qty: 18 G | Refills: 3 | Status: SHIPPED | OUTPATIENT
Start: 2024-01-04

## 2024-01-04 ASSESSMENT — PAIN SCALES - GENERAL: PAINLEVEL: 0-NO PAIN

## 2024-01-04 ASSESSMENT — PATIENT HEALTH QUESTIONNAIRE - PHQ9
SUM OF ALL RESPONSES TO PHQ9 QUESTIONS 1 AND 2: 0
1. LITTLE INTEREST OR PLEASURE IN DOING THINGS: NOT AT ALL
2. FEELING DOWN, DEPRESSED OR HOPELESS: NOT AT ALL

## 2024-01-04 ASSESSMENT — ENCOUNTER SYMPTOMS
OCCASIONAL FEELINGS OF UNSTEADINESS: 0
LOSS OF SENSATION IN FEET: 0
DEPRESSION: 0

## 2024-01-04 NOTE — PROGRESS NOTES
"Subjective   Patient ID: Tip Lynn is a 53 y.o. male who presents for Establish Care ('my shoulder has been hurting').    HPI  Hx gout, HTN, HLD, asthma, CARLOS on CPAP, T2DM, seizure, and anxiety    #Left shoulder pain  #Rotator Cuff impingement  #Subacromial bursitis  - Onset July/ August 2023  - Left anterior shoulder pain  - Duration of all day  - Characterized by sharp pain  - Aggravating sleeping on left side  - Relieved by subacromial injections \"helped some\".   - per nephro 6/12/2023 \"avoid NSAIDs\"  - Previously evaluated by Rheumatology  S/p Sub acromial CSI (corticosteroid injection) with immediate relief of symptoms and improvement of ROM to full  PT referral provided  - L shoudler XR (10/16/23) shows Mild degenerative changes left shoulder.     #asthma  #CARLOS on cpap  - refill albuterol  - worse in winter months  - use albuterol twice daily   - nighttime awakenings x1 weekly  - controller medication : symbicort    #DM2  - A1C 6.0  - follows w/ podiatry, endocrinology  - takes metformin 500mg BID, Trulicity 0.75mg weekly   - lipitor 40mg every day. Refill    #Gout  - follows w/ rheumatology  - takes allopurinol 500mg  every day     #Seizures  - follows w/ neurology  - pending EEG? Not performed as pt states there was \"no answer\"    Review of Systems    Previous history  Past Medical History:   Diagnosis Date    Cough, unspecified 03/22/2017    Cough    Myalgia, other site 12/19/2022    Chronic musculoskeletal pain    Other disorders of lung     Lung trouble    Personal history of other diseases of the circulatory system     History of coronary artery disease    Personal history of other diseases of the circulatory system     History of hypertension    Personal history of other diseases of the nervous system and sense organs     History of sleep apnea    Personal history of other specified conditions     History of chest pain    Personal history of other specified conditions     History of heartburn    " Unspecified convulsions (CMS/HCC) 11/14/2022    Seizure     Past Surgical History:   Procedure Laterality Date    ANKLE SURGERY  03/24/2014    Ankle Surgery    CT ANGIO CORONARY ART WITH HEARTFLOW IF SCORE >30%  3/14/2020    CT HEART CORONARY ANGIOGRAM 3/14/2020 [facility] [place] [facility]    OTHER SURGICAL HISTORY  04/25/2019    Ear pressure equalization tube insertion    RECTAL POLYPECTOMY  06/30/2016    Rectal Surgery Polypectomy     Social History     Tobacco Use    Smoking status: Never    Smokeless tobacco: Never   Substance Use Topics    Alcohol use: Never    Drug use: Never     Family History   Problem Relation Name Age of Onset    Lung cancer Mother      Coronary artery disease Father       Allergies   Allergen Reactions    Aspirin Anaphylaxis    Topiramate Anxiety and Other     delirium    Salicylates Swelling     Tolerates ibuprofen    Penicillins Hives, Itching and Swelling    Lidopatch [Lidocaine-Menthol] Itching and Rash     Current Outpatient Medications   Medication Instructions    acetaminophen (TYLENOL ARTHRITIS PAIN) 650 mg, oral, 3 times daily PRN    acetaminophen (TYLENOL) 1,000 mg, oral, Every 6 hours    albuterol 90 mcg/actuation inhaler 1 puff, inhalation, Every 6 hours PRN    allopurinol (Zyloprim) 100 mg tablet 1 tablet, oral, Daily    allopurinol (Zyloprim) 100 mg tablet TAKE 1 TABLET BY MOUTH ONCE DAILY TOTAL DAILY DOSE IS 400MG IN COMBINATION WITH 300MG    allopurinol (Zyloprim) 100 mg tablet TAKE 1 TABLET BY MOUTH ONCE DAILY    allopurinol (Zyloprim) 300 mg tablet TAKE 1 TABLET BY MOUTH ONCE DAILY    allopurinol (ZYLOPRIM) 300 mg, oral, Daily    amLODIPine (Norvasc) 10 mg tablet 1 tablet, oral, Daily, As directed    amLODIPine (NORVASC) 5 mg, oral, Daily    atorvastatin (LIPITOR) 40 mg, oral, Nightly    blood sugar diagnostic strip TEST FOUR TIMES A DAY    budesonide-formoteroL (Symbicort) 80-4.5 mcg/actuation inhaler 2 puffs, inhalation, 2 times daily RT, Rinse mouth with water after use to  reduce aftertaste and incidence of candidiasis. Do not swallow.    calcium carbonate (Tums Ultra) 400 mg calcium (1,000 mg) chewable tablet 1 tablet, oral, Every 2 hour PRN    ciclopirox (Penlac) 8 % solution Topical, Weekly, GENTLY MASSAGE INTO L big toenail daily to top, under and around nail. wipe off with alcohol and trim.    clindamycin (CLEOCIN) 300 mg, oral, 3 times daily    clotrimazole (Lotrimin) 1 % cream 1 Application, Topical, 2 times daily, To affected area    colchicine 0.6 mg, oral, Daily    diclofenac sodium (VOLTAREN) 4 g, Topical, 2 times daily, To affected area    docusate sodium (COLACE) 100 mg, oral, 2 times daily    Easy Touch Alcohol Prep Pads pads, medicated 3 times daily, Use as directed    escitalopram (LEXAPRO) 10 mg, oral, Daily    FreeStyle Lite Strips strip Daily PRN, Use as directed    hydroCHLOROthiazide (HYDRODiuril) 25 mg tablet 1 tablet, oral, Daily    hydroCHLOROthiazide (HYDRODIURIL) 12.5 mg, oral, Daily    hydrocortisone 2.5 % lotion Topical, 2 times daily, To rash    ibuprofen 800 mg tablet 1 tablet, oral, Every 6 hours    ibuprofen 600 mg, oral, Every 8 hours PRN    LISINOPRIL ORAL oral    lisinopriL-hydrochlorothiazide 10-12.5 mg tablet 1 tablet, oral, Daily    meclizine (ANTIVERT) 25 mg, oral, 3 times daily PRN    melatonin 3 mg capsule 1 capsule, oral, Nightly PRN    metFORMIN (GLUCOPHAGE) 500 mg, oral, 2 times daily with meals, Morning and evening meals    naproxen (Naprosyn) 500 mg tablet oral, Every 12 hours    OneTouch Delica Plus Lancet 30 gauge misc 3 times daily, As directed    OneTouch Ultra Test strip 2 times daily    OneTouch UltraSoft Lancets misc 2 times daily    Ozempic 0.25 mg, subcutaneous, Weekly    pantoprazole (PROTONIX) 40 mg, oral, Daily    polyethylene glycol (GLYCOLAX, MIRALAX) 17 g, oral, Daily, IN 8 OUNCES OF LIQUID AND DRINK; TITRATE TO 1 BOWEL MOVEMENT PER DAY<BR>    psyllium (Konsyl) 6 gram packet oral, Daily, 1-3 tsp - mix with 8oz of water     psyllium (Metamucil, sugar,) powder oral, Daily, 1 tbsp in liquid    Trulicity 0.75 mg/0.5 mL pen injector 1 Pen, subcutaneous, Weekly       Objective       Physical Exam  HENT:      Head: Normocephalic and atraumatic.   Eyes:      General: No scleral icterus.     Conjunctiva/sclera: Conjunctivae normal.   Cardiovascular:      Rate and Rhythm: Normal rate and regular rhythm.      Heart sounds: No murmur heard.     No friction rub. No gallop.   Pulmonary:      Effort: Pulmonary effort is normal. No respiratory distress.      Breath sounds: Normal breath sounds.   Abdominal:      General: There is no distension.      Palpations: Abdomen is soft.      Tenderness: There is no abdominal tenderness.   Musculoskeletal:         General: Normal range of motion.      Comments: Left shoulder with anterior TTP. ROM limited. Positive impingement testing Bermudez and Neers   Skin:     General: Skin is warm and dry.   Neurological:      General: No focal deficit present.      Mental Status: He is alert and oriented to person, place, and time.   Psychiatric:         Mood and Affect: Mood normal.       Assessment/Plan   Tip Lynn is a 53 y.o. male who presents for the concerns below:    Problem List Items Addressed This Visit       Asthma - Primary    Relevant Medications    albuterol 90 mcg/actuation inhaler    budesonide-formoteroL (Symbicort) 80-4.5 mcg/actuation inhaler    Gastroesophageal reflux disease    Relevant Medications    pantoprazole (ProtoNix) 40 mg EC tablet     Other Visit Diagnoses       Type 2 diabetes mellitus without complication, without long-term current use of insulin (CMS/[facility])        Relevant Medications    atorvastatin (Lipitor) 40 mg tablet    Impingement of shoulder        Relevant Orders    Referral to Physical Therapy          #Left shoulder pain  #Rotator Cuff impingement  #Subacromial bursitis  - PT referral provided  - Tylenol for pain control    #Moderate persistent asthma  - refill  albuterol  - start symbicort as controller medication    #DM2  - A1C 6.0  - follows w/ podiatry, endocrinology  - c/w metformin 500mg BID, Trulicity 0.75mg weekly   - lipitor 40mg every day. Refilled    #Gout  - follows w/ rheumatology  - c/w allopurinol 500mg  every day     #Seizures  - Encouraged to follow up with Neurology and undergo EEG    Discussed with:  Dr. Mendosa  Return in : 3 months    Portions of this note were generated using digital voice recognition software, and may contain grammatical errors       Christ Weston, DO  PGY-2, Family Medicine

## 2024-01-09 ENCOUNTER — PHARMACY VISIT (OUTPATIENT)
Dept: PHARMACY | Facility: CLINIC | Age: 54
End: 2024-01-09
Payer: COMMERCIAL

## 2024-01-09 NOTE — PROGRESS NOTES
I reviewed the resident/fellow's documentation and discussed the patient with the resident/fellow. I agree with the resident/fellow's medical decision making as documented in the note.     Nallely Mendosa MD

## 2024-01-15 ENCOUNTER — OFFICE VISIT (OUTPATIENT)
Dept: RHEUMATOLOGY | Facility: CLINIC | Age: 54
End: 2024-01-15
Payer: COMMERCIAL

## 2024-01-15 ENCOUNTER — APPOINTMENT (OUTPATIENT)
Dept: RHEUMATOLOGY | Facility: CLINIC | Age: 54
End: 2024-01-15
Payer: COMMERCIAL

## 2024-01-15 VITALS
HEART RATE: 58 BPM | BODY MASS INDEX: 32.76 KG/M2 | WEIGHT: 234 LBS | HEIGHT: 71 IN | SYSTOLIC BLOOD PRESSURE: 131 MMHG | DIASTOLIC BLOOD PRESSURE: 74 MMHG

## 2024-01-15 DIAGNOSIS — M12.812 ROTATOR CUFF ARTHROPATHY OF LEFT SHOULDER: ICD-10-CM

## 2024-01-15 DIAGNOSIS — M1A.0790 CHRONIC IDIOPATHIC GOUT INVOLVING TOE WITHOUT TOPHUS, UNSPECIFIED LATERALITY: Primary | ICD-10-CM

## 2024-01-15 PROCEDURE — 1036F TOBACCO NON-USER: CPT | Performed by: INTERNAL MEDICINE

## 2024-01-15 PROCEDURE — 3075F SYST BP GE 130 - 139MM HG: CPT | Performed by: INTERNAL MEDICINE

## 2024-01-15 PROCEDURE — 4010F ACE/ARB THERAPY RXD/TAKEN: CPT | Performed by: INTERNAL MEDICINE

## 2024-01-15 PROCEDURE — 99214 OFFICE O/P EST MOD 30 MIN: CPT | Performed by: INTERNAL MEDICINE

## 2024-01-15 PROCEDURE — 3078F DIAST BP <80 MM HG: CPT | Performed by: INTERNAL MEDICINE

## 2024-01-15 RX ORDER — MELOXICAM 7.5 MG/1
7.5 TABLET ORAL DAILY
Qty: 30 TABLET | Refills: 0 | Status: SHIPPED | OUTPATIENT
Start: 2024-01-15 | End: 2024-02-14

## 2024-01-15 ASSESSMENT — PAIN SCALES - GENERAL: PAINLEVEL: 9

## 2024-01-15 NOTE — PATIENT INSTRUCTIONS
Please do PT for your left shoulder  Can use meloxicam after meals once a day for 14 days and then use it on as needed basis    Blood tests ordered for today    Follow up in 4-6 months unless needed earlier

## 2024-01-15 NOTE — PROGRESS NOTES
Rheumatology Office Visit      Subjective     Patient ID: [id number]     Tip Lynn is a 53 y.o. male who presents for Follow-up (Chronic Gout).    Subjective    52 year old with metabolic syndrome,   Gout, left Rotator cuff impingement and Left Hip TAYLOR presents for follow up     Current meds  Allopurinol 400 mg daily from  on 9/14/2023    Last UA 5.1 mg/dl    RECALL     Mentions his joints have been bothering him since the start of this year.  does not recall any inciting event, has more pain toward the end of the day, mainly in the knees and the right gluteal region.  Does not notice any swelling of the joints or redness.  Pain gets worse with activity, at times laying to his right side also hurts him.  Denies any recent trauma but had trauma to his ankle from a fall many years ago and required internal fixation.  Uses ibuprofen once a day on an average that provides some relief.  Allopurinol started by PCP but no gout episodes. Has high UA levels.  Has been trying to be more active, recently joined a gym, trying to lose weight.     No fevers, chills, sweating, Skin rash, Eye redness, Raynaud's, Skin tightening, Oral or Genital Ulcers  Does not have inflammatory back pain.  No diagnosis of IBD, Uveitis, Psoriasis  No family history of autoimmune issues     Previously has HTN and CARLOS with Pre DM  Prescribed CPAP but thinks it is not working  has daytime somnolence and fatigue         Interval History  worsening of left shoulder pain again , overhead movements in particular , symptoms are getting worse   Doning/doffing is an issue because of limited left arm / shoulder mvoement and some IADL are being affected,  He was unable to have PT appointment, referred 3 times   Unsure what the reason is  He is not on pain killers  Denies any trauma  laying on left shoulder is very painful, pain has worsened since then, but no bruising or swelling  S/p CSI in July helped for about a month  not on regular NSAIDs, has  "borderline renal parematers   No further gout flares  Adherent to 400mg daily and dietary modifications     Review of Systems  Constitutional: Denies fever, chills  Eyes: Denies dry eyes, redness of the eyes, pain in the eyes   ENT: Denies dry mouth, sores in the mouth, poor dentition   Cardiovascular: Denies chest pain, lower extremity edema  Respiratory: Denies shortness of breath, cough, hemoptysis  Gastrointestinal: Denies abdominal pain, N/V/D, dysphagia, odynophagia, heartburn   Integumentary: Denies rash, photosensitivity, nail changes, alopecia  Neurological: Denies any numbness, tingling, focal weakness   Hematologic/Lymphatic: Denies bleeding, easy bruising, Raynaud's phenomena   MSK: As per HPI       Objective  Vitals:    01/15/24 1030   BP: 131/74   Pulse: 58   Weight: 106 kg (234 lb)   Height: 1.803 m (5' 11\")       Physical Exam  General: Cooperative, in NAD   Eyes: Conjunctiva clear, sclera white, anicteric   Nose: No external deformity, no mucosal crusting or signs of bleeding   Throat/Mouth: Moist mucous membranes, normal dentition, no ulcers or lesions   Lungs: No respiratory distress; lungs CTAB; no wheezes, rales, rhonchi, or stridor   Heart: Normal S1 and S2; regular rate and rhythm; no murmurs, rubs, or gallops  Skin: No rashes, ulcers, tophi, or nodules noted  MSK:   Spine: Normal posture, no point tenderness to palpation, normal ROM  Upper Extremities:   Hands: No erythema, warmth, synovitis, or pain of the DIPs, PIPs, or MCPs; normal ROM  Wrists: No erythema, warmth, synovitis, or pain of the wrists; normal ROM  Elbows: No erythema, warmth, synovitis, or pain; normal ROM   Shoulders: no subacromial tenderness, some tenderness over the ant aspect over the bicipital groove, Left shoulder limited active abudction and flexion to 110 degress  With passive abduction and flexion also restricted to 80 degrees and 70 degrees respectively ( previously around 120 degrees)  External rotation limited > " Internal rotation    Left shoulder Ultrasound  Ultrasound of left shoulder did not show any tear of biceps tendon, supraspinatus, infraspinatus or sub scapularis  No fluid around the subacromial bursae seen but there is thickening of subacomial bursa anterior views  Could not visualize the GH joint and Labrum well on the ultrasound    Lower Extremities:   Hips: No gross deformity, erythema, warmth, or pain; normal ROM   Knees: No erythema, warmth, swelling, or pain; normal ROM   Ankles: No erythema, warmth, synovitis, or pain; normal ROM  Feet: No gross deformity, erythema, or swelling; MTP squeeze negative bilaterally    Last BMP:   Lab Results   Component Value Date     09/04/2023    K 4.2 09/04/2023     09/04/2023    CO2 27 09/04/2023    BUN 15 09/04/2023    CREATININE 1.29 09/04/2023    CALCIUM 9.5 09/04/2023     Lab Results   Component Value Date    URICACID 5.1 10/16/2023       Assessment/Plan   Diagnoses and all orders for this visit:  Chronic gout without tophus, unspecified cause, unspecified site  Now only on ULT without prophylaxis  UA not on target  On Allopurinol 400 mg   Last UA was in the target range  -     Uric Acid; today, will titrate further if needed    Left shoulder RC tendinitis with impingement  Possibly now developing frozen shoulder  No gross GH pathology seen on X-ray and limited views on ultrasound  No subacromial bursa fluid seen, but there is thickening ofsubacromial bursa  Unfortunately, PT has not been done yet.    Will prescribe short course of NSAIDs, eGFR is above 60 with no proteinuria  If no improvement from PT, will need MRI shoulder    -  Referral to Physical Therapy; Future  -  meloxicam (Mobic) 7.5 mg tablet; Take 1 tablet (7.5 mg) by mouth once daily for 14 days. For pain      Follow-up 6 months; earlier for left shoulder if no improvement with PT  Will order MRI left shoulder to look for labral issues, GH pathology, partial tears of RC    Patient seen and  discussed with attending Dr. Jeet Demarco MD

## 2024-01-17 ENCOUNTER — APPOINTMENT (OUTPATIENT)
Dept: DERMATOLOGY | Facility: CLINIC | Age: 54
End: 2024-01-17
Payer: COMMERCIAL

## 2024-02-02 ENCOUNTER — APPOINTMENT (OUTPATIENT)
Dept: OBSTETRICS AND GYNECOLOGY | Facility: CLINIC | Age: 54
End: 2024-02-02
Payer: COMMERCIAL

## 2024-02-05 ENCOUNTER — APPOINTMENT (OUTPATIENT)
Dept: RHEUMATOLOGY | Facility: CLINIC | Age: 54
End: 2024-02-05
Payer: COMMERCIAL

## 2024-02-19 ENCOUNTER — APPOINTMENT (OUTPATIENT)
Dept: OBSTETRICS AND GYNECOLOGY | Facility: CLINIC | Age: 54
End: 2024-02-19
Payer: COMMERCIAL

## 2024-02-19 ENCOUNTER — APPOINTMENT (OUTPATIENT)
Dept: RHEUMATOLOGY | Facility: CLINIC | Age: 54
End: 2024-02-19
Payer: COMMERCIAL

## 2024-02-26 ENCOUNTER — APPOINTMENT (OUTPATIENT)
Dept: ORTHOPEDIC SURGERY | Facility: HOSPITAL | Age: 54
End: 2024-02-26
Payer: COMMERCIAL

## 2024-02-29 ENCOUNTER — APPOINTMENT (OUTPATIENT)
Dept: RADIOLOGY | Facility: HOSPITAL | Age: 54
End: 2024-02-29
Payer: COMMERCIAL

## 2024-02-29 ENCOUNTER — HOSPITAL ENCOUNTER (EMERGENCY)
Facility: HOSPITAL | Age: 54
Discharge: HOME | End: 2024-02-29
Payer: COMMERCIAL

## 2024-02-29 VITALS
OXYGEN SATURATION: 97 % | RESPIRATION RATE: 19 BRPM | WEIGHT: 208 LBS | SYSTOLIC BLOOD PRESSURE: 163 MMHG | HEART RATE: 103 BPM | DIASTOLIC BLOOD PRESSURE: 79 MMHG | HEIGHT: 69 IN | TEMPERATURE: 99.9 F | BODY MASS INDEX: 30.81 KG/M2

## 2024-02-29 DIAGNOSIS — J18.9 COMMUNITY ACQUIRED PNEUMONIA, UNSPECIFIED LATERALITY: Primary | ICD-10-CM

## 2024-02-29 LAB
FLUAV RNA RESP QL NAA+PROBE: NOT DETECTED
FLUBV RNA RESP QL NAA+PROBE: NOT DETECTED
RSV RNA RESP QL NAA+PROBE: NOT DETECTED
SARS-COV-2 RNA RESP QL NAA+PROBE: NOT DETECTED

## 2024-02-29 PROCEDURE — 71046 X-RAY EXAM CHEST 2 VIEWS: CPT

## 2024-02-29 PROCEDURE — 71046 X-RAY EXAM CHEST 2 VIEWS: CPT | Performed by: RADIOLOGY

## 2024-02-29 PROCEDURE — 2500000001 HC RX 250 WO HCPCS SELF ADMINISTERED DRUGS (ALT 637 FOR MEDICARE OP): Mod: SE | Performed by: PHYSICIAN ASSISTANT

## 2024-02-29 PROCEDURE — 73030 X-RAY EXAM OF SHOULDER: CPT | Mod: LT

## 2024-02-29 PROCEDURE — 99284 EMERGENCY DEPT VISIT MOD MDM: CPT

## 2024-02-29 PROCEDURE — 73030 X-RAY EXAM OF SHOULDER: CPT | Mod: LEFT SIDE | Performed by: RADIOLOGY

## 2024-02-29 PROCEDURE — 99284 EMERGENCY DEPT VISIT MOD MDM: CPT | Performed by: PHYSICIAN ASSISTANT

## 2024-02-29 PROCEDURE — 2500000004 HC RX 250 GENERAL PHARMACY W/ HCPCS (ALT 636 FOR OP/ED): Mod: SE | Performed by: PHYSICIAN ASSISTANT

## 2024-02-29 PROCEDURE — 96372 THER/PROPH/DIAG INJ SC/IM: CPT

## 2024-02-29 PROCEDURE — 87637 SARSCOV2&INF A&B&RSV AMP PRB: CPT | Performed by: PHYSICIAN ASSISTANT

## 2024-02-29 RX ORDER — ORPHENADRINE CITRATE 30 MG/ML
60 INJECTION INTRAMUSCULAR; INTRAVENOUS ONCE
Status: COMPLETED | OUTPATIENT
Start: 2024-02-29 | End: 2024-02-29

## 2024-02-29 RX ORDER — BENZONATATE 100 MG/1
200 CAPSULE ORAL 3 TIMES DAILY PRN
Qty: 42 CAPSULE | Refills: 0 | Status: SHIPPED | OUTPATIENT
Start: 2024-02-29 | End: 2024-03-08

## 2024-02-29 RX ORDER — LEVOFLOXACIN 750 MG/1
750 TABLET ORAL DAILY
Qty: 5 TABLET | Refills: 0 | Status: SHIPPED | OUTPATIENT
Start: 2024-02-29 | End: 2024-03-06

## 2024-02-29 RX ORDER — ACETAMINOPHEN 325 MG/1
650 TABLET ORAL EVERY 6 HOURS PRN
Qty: 20 TABLET | Refills: 0 | Status: SHIPPED | OUTPATIENT
Start: 2024-02-29 | End: 2024-03-05

## 2024-02-29 RX ORDER — ACETAMINOPHEN 325 MG/1
975 TABLET ORAL ONCE
Status: COMPLETED | OUTPATIENT
Start: 2024-02-29 | End: 2024-02-29

## 2024-02-29 RX ORDER — KETOROLAC TROMETHAMINE 30 MG/ML
30 INJECTION, SOLUTION INTRAMUSCULAR; INTRAVENOUS ONCE
Status: COMPLETED | OUTPATIENT
Start: 2024-02-29 | End: 2024-02-29

## 2024-02-29 RX ORDER — LEVOFLOXACIN 750 MG/1
750 TABLET ORAL ONCE
Status: COMPLETED | OUTPATIENT
Start: 2024-02-29 | End: 2024-02-29

## 2024-02-29 RX ADMIN — ORPHENADRINE CITRATE 60 MG: 60 INJECTION INTRAMUSCULAR; INTRAVENOUS at 21:12

## 2024-02-29 RX ADMIN — ACETAMINOPHEN 975 MG: 325 TABLET ORAL at 21:12

## 2024-02-29 RX ADMIN — LEVOFLOXACIN 750 MG: 750 TABLET, FILM COATED ORAL at 23:00

## 2024-02-29 RX ADMIN — KETOROLAC TROMETHAMINE 30 MG: 30 INJECTION, SOLUTION INTRAMUSCULAR; INTRAVENOUS at 21:12

## 2024-02-29 ASSESSMENT — COLUMBIA-SUICIDE SEVERITY RATING SCALE - C-SSRS
1. IN THE PAST MONTH, HAVE YOU WISHED YOU WERE DEAD OR WISHED YOU COULD GO TO SLEEP AND NOT WAKE UP?: NO
2. HAVE YOU ACTUALLY HAD ANY THOUGHTS OF KILLING YOURSELF?: NO
6. HAVE YOU EVER DONE ANYTHING, STARTED TO DO ANYTHING, OR PREPARED TO DO ANYTHING TO END YOUR LIFE?: NO

## 2024-02-29 ASSESSMENT — LIFESTYLE VARIABLES
EVER HAD A DRINK FIRST THING IN THE MORNING TO STEADY YOUR NERVES TO GET RID OF A HANGOVER: NO
EVER FELT BAD OR GUILTY ABOUT YOUR DRINKING: NO
HAVE PEOPLE ANNOYED YOU BY CRITICIZING YOUR DRINKING: NO
HAVE YOU EVER FELT YOU SHOULD CUT DOWN ON YOUR DRINKING: NO

## 2024-02-29 ASSESSMENT — PAIN - FUNCTIONAL ASSESSMENT: PAIN_FUNCTIONAL_ASSESSMENT: 0-10

## 2024-02-29 ASSESSMENT — PAIN DESCRIPTION - LOCATION: LOCATION: ABDOMEN

## 2024-02-29 ASSESSMENT — PAIN SCALES - GENERAL: PAINLEVEL_OUTOF10: 0 - NO PAIN

## 2024-02-29 NOTE — Clinical Note
Tip Lynn was seen and treated in our emergency department on 2/29/2024.  He may return to work on 03/02/2024.       If you have any questions or concerns, please don't hesitate to call.      Amber Todd PA-C

## 2024-03-01 ENCOUNTER — PHARMACY VISIT (OUTPATIENT)
Dept: PHARMACY | Facility: CLINIC | Age: 54
End: 2024-03-01
Payer: COMMERCIAL

## 2024-03-01 ENCOUNTER — APPOINTMENT (OUTPATIENT)
Dept: ORTHOPEDIC SURGERY | Facility: HOSPITAL | Age: 54
End: 2024-03-01
Payer: COMMERCIAL

## 2024-03-01 PROCEDURE — RXMED WILLOW AMBULATORY MEDICATION CHARGE

## 2024-03-01 NOTE — ED PROVIDER NOTES
"This is a 53-year-old male with past medical history of diabetes, epilepsy, and hypertension who presents to the ED with flulike symptoms.  Patient endorses 2 to 3 days of generalized malaise, generalized bodyaches, productive cough with yellow mucus, subjective fevers and chills, nasal congestion, and rhinorrhea.  He has a known rotator cuff injury to his left shoulder and states it has been worse recently and is requesting an x-ray of this.  He states that he initially saw an orthopedic doctor, however has not yet followed up with them.  He denies any associated paresthesias, weakness or areas of decree sensation.  His pain in his left shoulder is much worse with trying to raise the arm over his head.  He did not take anything at home for his symptoms.      History provided by:  Patient   used: No             Visit Vitals  /79   Pulse (!) 103   Temp 37.7 °C (99.9 °F) (Oral)   Resp 19   Ht 1.753 m (5' 9\")   Wt 94.3 kg (208 lb)   SpO2 97%   BMI 30.72 kg/m²   Smoking Status Never   BSA 2.14 m²          Physical Exam     Physical exam:   General: Vitals noted, no distress. Afebrile.   EENT:  Hearing grossly intact. Normal phonation. MMM. Airway patient. PERRL. EOMI.   Neck: No midline tenderness or paraspinal tenderness. FROM.   Cardiac: Regular, rate, rhythm. Normal S1 and S2.  No murmurs, gallops, rubs.   Pulmonary: Good air exchange. Lungs clear bilaterally. No wheezes, rhonchi, rales. No accessory muscle use.   Abdomen: Soft, nonsurgical. Nontender. No peritoneal signs. Normoactive bowel sounds.   Back: No CVA tenderness. No midline tenderness or paraspinal tenderness. No obvious deformity.   Extremities: No peripheral edema.  Decreased range of motion of the left shoulder secondary to pain with reproducible tenderness to palpation to the entire left shoulder.  No obvious deformity.  Skin: No rash. Warm and Dry.   Neuro: No focal neurologic deficits. CN 2-12 grossly intact. Sensation " equal bilaterally. No weakness.         Labs Reviewed   SARS-COV-2 AND INFLUENZA A/B PCR   RSV PCR       XR chest 2 views    (Results Pending)   XR shoulder left 2+ views    (Results Pending)         ED Course & MDM     Medical Decision Making  This is a 53-year-old male with past lisuride diabetes, epilepsy and hypertension who presents to the ED with flulike symptoms as well as worsening left shoulder pain with a known rotator cuff injury.  Vitals did show he was slightly tachycardic to 103 bpm and low-grade fever at 99.9 °F.  Lungs clear to auscultation.  No audible cardiac murmurs.  He did have reproducible left shoulder tenderness.  Viral swab obtained as well as chest x-ray and x-ray of the shoulder.  Patient medicated IM Toradol and Norflex as well as oral Tylenol for his symptoms.  On reevaluation he was feeling improved.  Viral swab negative for COVID, flu, and RSV.  Chest x-ray concerning for possible right infrahilar and left basilar opacities versus atelectasis.  Based on the patient's symptoms high suspicion for infection at this time.  Patient is allergic to penicillins with his allergy being swelling so he was covered for community-acquired pneumonia with Levaquin due to his other comorbidities.  He was given a dose of this medication here in the ED as well as a prescription for this to go home with.  He was also given prescriptions for Tessalon Perles and Tylenol for symptoms at home.  He was agreeable to this plan.  No further questions at this time and was discharged from the ED in stable condition.    Amount and/or Complexity of Data Reviewed  Labs: ordered.  Radiology: ordered and independent interpretation performed.     Details: Chest x-ray bilateral streaky opacities present.    Risk  Prescription drug management.         Diagnoses as of 02/29/24 2303   Community acquired pneumonia, unspecified laterality       Procedures    RAYO Payan PA-C Abigail E Wilch, PA-C  02/29/24 2304

## 2024-03-01 NOTE — ED TRIAGE NOTES
Enters ED reporting generalized muscle aches. Associated symptoms include productive cough & nasal congestion. Denies sick contacts. Rates pain 8/10. Denies n/v/d. Baseline history of Gout and L-rotator cuff injury.

## 2024-03-07 ENCOUNTER — APPOINTMENT (OUTPATIENT)
Dept: UROLOGY | Facility: CLINIC | Age: 54
End: 2024-03-07
Payer: COMMERCIAL

## 2024-03-08 ENCOUNTER — EVALUATION (OUTPATIENT)
Dept: PHYSICAL THERAPY | Facility: HOSPITAL | Age: 54
End: 2024-03-08
Payer: COMMERCIAL

## 2024-03-08 DIAGNOSIS — G89.29 CHRONIC LEFT SHOULDER PAIN: ICD-10-CM

## 2024-03-08 DIAGNOSIS — M25.512 CHRONIC LEFT SHOULDER PAIN: ICD-10-CM

## 2024-03-08 DIAGNOSIS — R26.2 DIFFICULTY WALKING: ICD-10-CM

## 2024-03-08 DIAGNOSIS — M75.82 TENDINITIS OF LEFT ROTATOR CUFF: Primary | ICD-10-CM

## 2024-03-08 DIAGNOSIS — M25.819 IMPINGEMENT OF SHOULDER: ICD-10-CM

## 2024-03-08 PROCEDURE — 97110 THERAPEUTIC EXERCISES: CPT | Mod: GP | Performed by: PHYSICAL THERAPIST

## 2024-03-08 PROCEDURE — 97161 PT EVAL LOW COMPLEX 20 MIN: CPT | Mod: GP | Performed by: PHYSICAL THERAPIST

## 2024-03-08 ASSESSMENT — PATIENT HEALTH QUESTIONNAIRE - PHQ9
7. TROUBLE CONCENTRATING ON THINGS, SUCH AS READING THE NEWSPAPER OR WATCHING TELEVISION: MORE THAN HALF THE DAYS
5. POOR APPETITE OR OVEREATING: NOT AT ALL
1. LITTLE INTEREST OR PLEASURE IN DOING THINGS: SEVERAL DAYS
SUM OF ALL RESPONSES TO PHQ QUESTIONS 1-9: 9
8. MOVING OR SPEAKING SO SLOWLY THAT OTHER PEOPLE COULD HAVE NOTICED. OR THE OPPOSITE, BEING SO FIGETY OR RESTLESS THAT YOU HAVE BEEN MOVING AROUND A LOT MORE THAN USUAL: SEVERAL DAYS
6. FEELING BAD ABOUT YOURSELF - OR THAT YOU ARE A FAILURE OR HAVE LET YOURSELF OR YOUR FAMILY DOWN: NOT AT ALL
3. TROUBLE FALLING OR STAYING ASLEEP OR SLEEPING TOO MUCH: NEARLY EVERY DAY
9. THOUGHTS THAT YOU WOULD BE BETTER OFF DEAD, OR OF HURTING YOURSELF: NOT AT ALL
SUM OF ALL RESPONSES TO PHQ9 QUESTIONS 1 AND 2: 3
4. FEELING TIRED OR HAVING LITTLE ENERGY: NOT AT ALL
2. FEELING DOWN, DEPRESSED OR HOPELESS: MORE THAN HALF THE DAYS

## 2024-03-08 ASSESSMENT — BALANCE ASSESSMENTS
STANDING UNSUPPORTED: ABLE TO STAND SAFELY FOR 2 MINUTES
STANDING TO SITTING: CONTROLS DESCENT BY USING HANDS
TRANSFERS: ABLE TO TRANSFER SAFELY WITH MINOR USE OF HANDS
LONG VERSION TOTAL SCORE (MAX 56): 51
REACHING FORWARD WITH OUTSTRETCHED ARM WHILE STANDING: CAN REACH FORWARD CONFIDENTLY 25 CM (10 INCHES)
STANDING UNSUPPORTED ONE FOOT IN FRONT: ABLE TO PLACE FOOT AHEAD INDEPENDENTLY AND HOLD 30 SECONDS
STANDING TO SITTING: ABLE TO STAND INDEPENDENTLY USING HANDS
TURN 360 DEGREES: ABLE TO TURN 360 DEGREES SAFELY IN 4 SECONDS OR LESS
STANDING ON ONE LEG: ABLE TO LIFT LEG INDEPENDENTLY AND HOLD GREATER THAN 10 SECONDS
PLACE ALTERNATE FOOT ON STEP OR STOOL WHILE STANDING UNSUPPORTED: LOOKS BEHIND FROM BOTH SIDES AND WEIGHT SHIFTS WELL
PICK UP OBJECT FROM THE FLOOR FROM A STANDING POSITION: ABLE TO PICK UP SLIPPER BUT NEEDS SUPERVISION
STANDING UNSUPPORTED WITH FEET TOGETHER: ABLE TO PLACE FEET TOGETHER INDEPENDENTLY AND STAND 1 MINUTE SAFELY
SITTING WITH BACK UNSUPPORTED BUT FEET SUPPORTED ON FLOOR OR ON A STOOL: ABLE TO SIT SAFELY AND SECURELY FOR 2 MINUTES
PLACE ALTERNATE FOOT ON STEP OR STOOL WHILE STANDING UNSUPPORTED: ABLE TO STAND INDEPENDENTLY AND SAFELY AND COMPLETE 8 STEPS IN 20 SECONDS
STANDING UNSUPPORTED WITH EYES CLOSED: ABLE TO STAND 10 SECONDS WITH SUPERVISION

## 2024-03-08 ASSESSMENT — ENCOUNTER SYMPTOMS
LOSS OF SENSATION IN FEET: 1
DEPRESSION: 1
OCCASIONAL FEELINGS OF UNSTEADINESS: 1

## 2024-03-08 NOTE — PROGRESS NOTES
Initial evaluation  Physical Therapy Initial Evaluation    Patient Name:Tip Lynn  MRN:[id number]  Today's Date:3/8/2024  Referred by: Nallely Mendosa  Time Calculation  Start Time: 0950  Stop Time: 1030  Time Calculation (min): 40 min    Therapy Diagnosis  1. Tendinitis of left rotator cuff    2. Impingement of shoulder    3. Chronic left shoulder pain    4. Difficulty walking         Plan of Care  Planned interventions include PRN: therapeutic exercise, manual therapy, gait training, electrical stimulation, hot pack, vasopneumatic device with cold, HEP training.   Frequency and duration: 1-2 time(s) a week, for  6 weeks or  12  visits .   Plan of care was developed with input and agreement by the patient.   Plan for next session: review HEP, PROM of L shoulder, AAROM of L shoulder    Assessment  Problem List: Pain, range of motion/joint mobility, strength, activity limitations, ADLs/IADLs/self care skills, decreased functional level, balance, fall risk, decreased knowledge of HEP, edema, flexibility, gait/locomotion, and posture.     Patient is a 53 y.o. male who presents with complaint of chronic left shoulder pain and difficulty walking as a result of gout. Standardized testing and measures administered today reveal that the patient has multiple impairments in body structures and functions, activity limitations, and participation restrictions. These include subjective and objective findings such as pain, tenderness to palpation of the affected area, decreased ROM, strength, flexibility, and function. The patient's impairments are likely influenced by mechanical dysfunction and deconditioning with possible overuse and degenerative changes. Skilled PT services are warranted in order to realize measurable and meaningful change in the above outcome measures and achieve improvements in the patient's functional status and individual goals.    Rehab Potential:fair  Clinical Presentation: Stable and/or  uncomplicated characteristics.   Evaluation Complexity:Moderate      Precautions/Fall Risk:fall risk Mod Pacemaker no  Seizures Yes  Post Op Movement/Restrictions No    Insurance  Visit number: 1   Approved number of visits: 12 then requires auth  Onset Date: No onset impairment date on file.  Certification Period:  Beginning:     3/8/2024            Ending:   Payor: [employer] / Plan: [employer] SHIRLENE / Product Type: *No Product type* /     Subjective  Chief complaint/reason for visit: Patient is a 54 y/o male presenting with L shoulder pain and difficulty/pain with walking due to gout. He has history of gouts, HTN, asthma, CARLOS, T2DM, seizures, and anxiety. Complaints in L shoulder started in 2023 without reported JENNIFER. X-rays show mild degenerative changes to L shoulder. Has scheduled appointment with ortho for L shoulder 3/11.  Mechanism of Injury:  a chronic condition  Location of Pain: L shoulder and bilateral feet L>R  Current Pain Level (0-10): 7   High Pain Level (0-10): 10   Low Pain Level (0-10): 3  Pain Quality: aching  Pain Exacerbating Factors: movement, standing, walking, raising arm overhead, overhead work, overuse, lifting, stairs  Pain Relieving Factors: heat and ice    Medical Screening: Reviewed medical history form with patient and medical screening assessed.   Red Flags: Do you have any of the following? No  Fever/chills, unexplained weight changes, dizziness/fainting, unexplained change in bowel or bladder functions, unexplained malaise or muscle weakness, night pain/sweats, numbness or tingling  Current Medical Management: PCP, injections, and imaging  Prior Level of Function (PLOF)  Patient previously independent with all ADLs  Exercise/Physical Activity: None  Functional limitations: driving, reaching, self-care activities,  push/pull activities, participation in home management/duties, participation in leisure activities and athletics.  Work Status: unemployed  Current Status:  worsened   Patient Awareness: Patient is aware of  his diagnosis and prognosis.   Living Environment: house  Social Support: children / family / friends to help, patient and lives with children  Personal Factors That May Impact Care:  psychosocial factors, emotional, lack of support system    Patient's Goal for Treatment: relieving pain, increasing strength, increasing mobility, reducing symptoms, returning to work,  and resuming athletics/activities .     Objective  Other Measures  Disability of Arm Shoulder Hand (DASH): 81.8%    Tinetti  Sitting Balance: Steady, safe  Arises: Able, uses arms to help  Attempts to Arise: Able to arise, one attempt  Immediate Standing Balance (First 5 Seconds): Steady without walker or other support  Standing Balance: Narrow stance without support  Nudged: Steady without walker or other support  Eyes Closed: Steady  Turned 360 Degrees: Steadiness: Steady  Turned 360 Degrees: Continuity of Steps: Continuous  Sitting Down: Uses arms or not a smooth motion  Balance Score: 14    Other Measures  Disability of Arm Shoulder Hand (DASH): 81.8%Kennedy Balance Scale  1. Sitting to Standing: Able to stand independently using hands  2. Standing Unsupported: Able to stand safely for 2 minutes  3. Sitting with Back Unsupported but Feet Supported on Floor or on a Stool: Able to sit safely and securely for 2 minutes  4. Standing to Sitting: Controls descent by using hands  5.  Transfers: Able to transfer safely with minor use of hands  6. Standing Unsupported with Eyes Closed: Able to stand 10 seconds with supervision  7. Standing Unsupported with Feet Together: Able to place feet together independently and stand 1 minute safely  8. Reach Forward with Outstretched Arm While Standing: Can reach forward confidently 25 cm (10 inches)  9.  Object from Floor from a Standing Position: Able to  slipper but needs supervision  10. Turning to Look Behind Over Left and Right Shoulders While  Standing: Looks behind from both sides and weight shifts well  11. Turn 360 Degrees: Able to turn 360 degrees safely in 4 seconds or less  12. Place Alternate Foot on Step or Stool While Standing Unsupported: Able to stand independently and safely and complete 8 steps in 20 seconds  13. Standing Unsupported One Foot in Front: Able to place foot ahead independently and hold 30 seconds  14. Standing on One Leg: Able to lift leg independently and hold greater than 10 seconds  Kennedy Balance Score: 51    Upper Extremity Objective:  Observation/Posture: significantly rounded shoulder posture  Palpation: TTP anterior/lateral shoulder and L UT  PROM (tested in supine): R shoulder WFL  L shoulder flexion AROM (degrees): 85 deg with pain  L shoulder extension AROM (degrees): 20 deg with pain  L shoulder abduction AROM (degrees):75 deg with pain  L shoulder ER AROM (degrees): 5 deg with pain and stiffness  L shoulder IR AROM (degrees): 30 deg with pain   MMT: R shoulder 4/5  L shoulder flexion strength (MMT): 3+/5  L shoulder abduction strength (MMT): 3/5  L shoulder ER strength (MMT): 3+/5  L shoulder IR strength (MMT): 4/5  R/L elbow AROM (degrees): 4+/5    LE objective  Gait: assessed with and without rollator walker gait appears within functional limits  LE AROM: R/L ankle PF AROM (degrees):  WFL - tested through functional squat   LE MMT Strength: 4/5 globally    R/L LE flexibility: moderate HS and  gastro-soleus tightness  Single leg stance time (seconds): >5 seconds R/L    Special Tests: Shoulder (+) drop arm on L, (+) Bermudez, Neers, Aiyanaey's Reach, Empty Can (+) pain    Treatment Performed Today: Initial evaluation and patient education regarding diagnosis, prognosis, contributing factors, comorbidities, importance and instruction of HEP, role of PT, postural re-education, activity modification,     Therapeutic Exercise 10 minutes  Education/Resources provided today: Home Program   Luciano: Reviewed and performed the  "following:   Seated Scap squeeze: 5\" x10   Seated table slides: flexion/Abduction: 3\" hold x 10  Supine UE assisted Flexion: 3\" x10     Response to Treatment: improved knowledge and understanding of condition, decreased pain, improved joint mobility/ROM, improved flexibility, improved posture,     Goals: Goals set and discussed today.   Upper Extremity Goals  At time of discharge, the patient will:  1) Perform HEP independently within the clinic to show adherence to initial program for symptom management.  2) Patient will report a reduction in pain at its worst from 8/10 to 3/10 to allow independent performance of essential ADLs and iADLs at [facility].  3) Demonstrate proper scapular position and posture for improved positional tolerances to sitting and standing > 1 hr  4) Increase ROM of L shoulder by 50% all planes in order to improve the ability to perform essential OH, self-care and ADLs  5) Decrease score of Quick Dash by > 8 points to meet MCID, demonstrating a clinically significant improvement in function.  7) Patient will demonstrate Apley reach to L2 of affected shoulder to don/doff clothing without difficulty.  8) Patient will demonstrate functional strength of the affected shoulder to perform lift of 8-12 pounds for groceries and laundry duties.   9) Patient will demonstrate pain free strength of 4+ or >/5 for all affected shoulder groups for return to unrestricted lifting activities.  Lower Extremity Goals  Lower Extremity Goals: By discharge, patient will:  1) Demonstrate independence with home exercise program for overall symptom management  2) Increase overall exercise tolerance without adverse reaction or increased chief complaint  4) Increase strength of the B LE to 5/5 in order to improve the ability to perform essential ADLs  5) Report decrease in pain by >= 2 points to meet MCID  7) Ascend and descend 1 flight of stairs reciprocally and safely without an increase in symptoms  8) Ambulate x 5-10 min " without assistive device without an increase in symptoms    Patient stated goal: reduce pain and regain use of arms, decrease need for assistive device and reduce risk of falls

## 2024-03-11 ENCOUNTER — APPOINTMENT (OUTPATIENT)
Dept: PHYSICAL THERAPY | Facility: HOSPITAL | Age: 54
End: 2024-03-11
Payer: COMMERCIAL

## 2024-03-11 ENCOUNTER — APPOINTMENT (OUTPATIENT)
Dept: ORTHOPEDIC SURGERY | Facility: HOSPITAL | Age: 54
End: 2024-03-11
Payer: COMMERCIAL

## 2024-03-11 ENCOUNTER — TREATMENT (OUTPATIENT)
Dept: PHYSICAL THERAPY | Facility: HOSPITAL | Age: 54
End: 2024-03-11
Payer: COMMERCIAL

## 2024-03-11 ENCOUNTER — OFFICE VISIT (OUTPATIENT)
Dept: ORTHOPEDIC SURGERY | Facility: HOSPITAL | Age: 54
End: 2024-03-11
Payer: COMMERCIAL

## 2024-03-11 ENCOUNTER — APPOINTMENT (OUTPATIENT)
Dept: RHEUMATOLOGY | Facility: CLINIC | Age: 54
End: 2024-03-11
Payer: COMMERCIAL

## 2024-03-11 DIAGNOSIS — M75.02 ADHESIVE CAPSULITIS OF LEFT SHOULDER: Primary | ICD-10-CM

## 2024-03-11 DIAGNOSIS — G89.29 CHRONIC LEFT SHOULDER PAIN: ICD-10-CM

## 2024-03-11 DIAGNOSIS — M25.819 IMPINGEMENT OF SHOULDER: ICD-10-CM

## 2024-03-11 DIAGNOSIS — M25.512 CHRONIC LEFT SHOULDER PAIN: ICD-10-CM

## 2024-03-11 DIAGNOSIS — R26.2 DIFFICULTY WALKING: ICD-10-CM

## 2024-03-11 PROCEDURE — 2500000005 HC RX 250 GENERAL PHARMACY W/O HCPCS: Performed by: ORTHOPAEDIC SURGERY

## 2024-03-11 PROCEDURE — 20610 DRAIN/INJ JOINT/BURSA W/O US: CPT | Performed by: ORTHOPAEDIC SURGERY

## 2024-03-11 PROCEDURE — 2500000004 HC RX 250 GENERAL PHARMACY W/ HCPCS (ALT 636 FOR OP/ED): Performed by: ORTHOPAEDIC SURGERY

## 2024-03-11 PROCEDURE — 4010F ACE/ARB THERAPY RXD/TAKEN: CPT | Performed by: ORTHOPAEDIC SURGERY

## 2024-03-11 PROCEDURE — 99214 OFFICE O/P EST MOD 30 MIN: CPT | Performed by: ORTHOPAEDIC SURGERY

## 2024-03-11 PROCEDURE — 1036F TOBACCO NON-USER: CPT | Performed by: ORTHOPAEDIC SURGERY

## 2024-03-11 PROCEDURE — 97110 THERAPEUTIC EXERCISES: CPT | Mod: GP | Performed by: PHYSICAL THERAPIST

## 2024-03-11 PROCEDURE — 99204 OFFICE O/P NEW MOD 45 MIN: CPT | Performed by: ORTHOPAEDIC SURGERY

## 2024-03-11 PROCEDURE — 97016 VASOPNEUMATIC DEVICE THERAPY: CPT | Mod: GP | Performed by: PHYSICAL THERAPIST

## 2024-03-11 RX ORDER — LIDOCAINE HYDROCHLORIDE 10 MG/ML
4 INJECTION INFILTRATION; PERINEURAL
Status: COMPLETED | OUTPATIENT
Start: 2024-03-11 | End: 2024-03-11

## 2024-03-11 RX ORDER — TRIAMCINOLONE ACETONIDE 40 MG/ML
40 INJECTION, SUSPENSION INTRA-ARTICULAR; INTRAMUSCULAR
Status: COMPLETED | OUTPATIENT
Start: 2024-03-11 | End: 2024-03-11

## 2024-03-11 RX ADMIN — LIDOCAINE HYDROCHLORIDE 4 ML: 10 INJECTION, SOLUTION INFILTRATION; PERINEURAL at 09:29

## 2024-03-11 RX ADMIN — TRIAMCINOLONE ACETONIDE 40 MG: 400 INJECTION, SUSPENSION INTRA-ARTICULAR; INTRAMUSCULAR at 09:29

## 2024-03-11 NOTE — PROGRESS NOTES
HPI:  53 y.o. yo male  referred to me by  *** for infertility    Partner/wife name:   Partner/wife  :   Attempting Pregnancy for :   months  Previous pregnancies for either partner:     Previous Semen analysis / Labs:     Lab Results   Component Value Date    TESTOSTERONE 287 2021       PREVIOUS RISK FACTORS  History of testicular exposure to chemicals, radiation, or toxins: None  History of high fever, epididymitis, orchitis, prostatitis, sexually transmitted diseases, or trauma to the testicles: None  History of a varicocele, testicular torsion, cryptorchidism, postpubertile mumps or a family history of infertility: None  Coital Frequency:   Coital Lubricants: None  Problems with erections or ejaculation: None  Smoker?  Previous Testosterone or anabolic steroid Use: none      PRIOR Assisted Reproductive Technology:  None      PMH:  Past Medical History:   Diagnosis Date    Cough, unspecified 2017    Cough    Myalgia, other site 2022    Chronic musculoskeletal pain    Other disorders of lung     Lung trouble    Personal history of other diseases of the circulatory system     History of coronary artery disease    Personal history of other diseases of the circulatory system     History of hypertension    Personal history of other diseases of the nervous system and sense organs     History of sleep apnea    Personal history of other specified conditions     History of chest pain    Personal history of other specified conditions     History of heartburn    Unspecified convulsions (CMS/HCC) 2022    Seizure        PSH:  Past Surgical History:   Procedure Laterality Date    ANKLE SURGERY  2014    Ankle Surgery    CT ANGIO CORONARY ART WITH HEARTFLOW IF SCORE >30%  3/14/2020    CT HEART CORONARY ANGIOGRAM 3/14/2020 [facility] [place] [facility]    OTHER SURGICAL HISTORY  2019    Ear pressure equalization tube insertion    RECTAL POLYPECTOMY  2016    Rectal Surgery Polypectomy         Medications:    Current Outpatient Medications:     acetaminophen (Tylenol Arthritis Pain) 650 mg ER tablet, Take 1 tablet (650 mg) by mouth 3 times a day as needed (back pain)., Disp: , Rfl:     acetaminophen (Tylenol) 500 mg tablet, Take 2 tablets (1,000 mg) by mouth every 6 hours., Disp: , Rfl:     albuterol 90 mcg/actuation inhaler, Inhale 1 puff every 6 hours if needed for wheezing or shortness of breath., Disp: 18 g, Rfl: 3    allopurinol (Zyloprim) 100 mg tablet, Take 1 tablet (100 mg) by mouth once daily., Disp: , Rfl:     allopurinol (Zyloprim) 100 mg tablet, TAKE 1 TABLET BY MOUTH ONCE DAILY TOTAL DAILY DOSE IS 400MG IN COMBINATION WITH 300MG (Patient not taking: Reported on 1/15/2024), Disp: 90 tablet, Rfl: 2    allopurinol (Zyloprim) 100 mg tablet, TAKE 1 TABLET BY MOUTH ONCE DAILY (Patient not taking: Reported on 1/15/2024), Disp: 90 tablet, Rfl: 2    allopurinol (Zyloprim) 300 mg tablet, Take 1 tablet (300 mg) by mouth once daily., Disp: , Rfl:     allopurinol (Zyloprim) 300 mg tablet, TAKE 1 TABLET BY MOUTH ONCE DAILY, Disp: 90 tablet, Rfl: 1    amLODIPine (Norvasc) 10 mg tablet, Take 1 tablet (10 mg) by mouth once daily. As directed, Disp: , Rfl:     amLODIPine (Norvasc) 5 mg tablet, Take 1 tablet (5 mg) by mouth once daily., Disp: , Rfl:     atorvastatin (Lipitor) 40 mg tablet, Take 1 tablet (40 mg) by mouth once daily at bedtime., Disp: 90 tablet, Rfl: 3    blood sugar diagnostic strip, TEST FOUR TIMES A DAY, Disp: 100 each, Rfl: 6    budesonide-formoteroL (Symbicort) 80-4.5 mcg/actuation inhaler, Inhale 2 puffs 2 times a day. Rinse mouth with water after use to reduce aftertaste and incidence of candidiasis. Do not swallow., Disp: 10.2 g, Rfl: 2    calcium carbonate (Tums Ultra) 400 mg calcium (1,000 mg) chewable tablet, Chew 1 tablet (1,000 mg) every 2 hours if needed for indigestion (antacid)., Disp: , Rfl:     ciclopirox (Penlac) 8 % solution, Apply topically 1 (one) time per week. GENTLY  MASSAGE INTO L big toenail daily to top, under and around nail. wipe off with alcohol and trim., Disp: , Rfl:     clindamycin (Cleocin) 300 mg capsule, Take 1 capsule (300 mg) by mouth 3 times a day., Disp: , Rfl:     clotrimazole (Lotrimin) 1 % cream, Apply 1 Application topically twice a day. To affected area, Disp: , Rfl:     colchicine, gout, 0.6 mg tablet, Take 1 tablet (0.6 mg) by mouth once daily., Disp: , Rfl:     diclofenac sodium (Voltaren) 1 % gel gel, Apply 1 Application topically 2 times a day. To affected area, Disp: , Rfl:     docusate sodium (Colace) 100 mg capsule, Take 1 capsule (100 mg) by mouth 2 times a day., Disp: , Rfl:     Easy Touch Alcohol Prep Pads pads, medicated, 3 times a day. Use as directed, Disp: , Rfl:     escitalopram (Lexapro) 10 mg tablet, Take 1 tablet (10 mg) by mouth once daily., Disp: , Rfl:     FreeStyle Lite Strips strip, once daily as needed. Use as directed, Disp: , Rfl:     hydroCHLOROthiazide (HYDRODiuril) 12.5 mg tablet, Take 1 tablet (12.5 mg) by mouth once daily., Disp: , Rfl:     hydroCHLOROthiazide (HYDRODiuril) 25 mg tablet, Take 1 tablet (25 mg) by mouth once daily., Disp: , Rfl:     hydrocortisone 2.5 % lotion, Apply topically 2 times a day. To rash, Disp: , Rfl:     ibuprofen 600 mg tablet, Take 1 tablet (600 mg) by mouth every 8 hours if needed (pain)., Disp: , Rfl:     ibuprofen 800 mg tablet, Take 1 tablet (800 mg) by mouth every 6 hours., Disp: , Rfl:     LISINOPRIL ORAL, Take by mouth., Disp: , Rfl:     lisinopriL-hydrochlorothiazide 10-12.5 mg tablet, Take 1 tablet by mouth once daily., Disp: , Rfl:     meclizine (Antivert) 25 mg tablet, Take 1 tablet (25 mg) by mouth 3 times a day as needed for nausea or dizziness., Disp: , Rfl:     melatonin 3 mg capsule, Take 1 capsule by mouth as needed at bedtime., Disp: , Rfl:     metFORMIN (Glucophage) 500 mg tablet, Take 1 tablet (500 mg) by mouth 2 times a day with meals. Morning and evening meals, Disp: , Rfl:      naproxen (Naprosyn) 500 mg tablet, Take by mouth every 12 hours., Disp: , Rfl:     OneTouch Delica Plus Lancet 30 gauge misc, 3 times a day. As directed, Disp: , Rfl:     OneTouch Ultra Test strip, 2 times a day., Disp: , Rfl:     OneTouch UltraSoft Lancets misc, 2 times a day., Disp: , Rfl:     Ozempic 0.25 mg or 0.5 mg (2 mg/3 mL) pen injector, Inject 0.25 mg under the skin 1 (one) time per week., Disp: , Rfl:     pantoprazole (ProtoNix) 40 mg EC tablet, Take 1 tablet (40 mg) by mouth once daily., Disp: 30 tablet, Rfl: 1    polyethylene glycol (Glycolax, Miralax) 17 gram/dose powder, Take 17 g by mouth once daily. IN 8 OUNCES OF LIQUID AND DRINK; TITRATE TO 1 BOWEL MOVEMENT PER DAY, Disp: , Rfl:     psyllium (Konsyl) 6 gram packet, Take by mouth once daily. 1-3 tsp - mix with 8oz of water, Disp: , Rfl:     psyllium (Metamucil, sugar,) powder, Take by mouth once daily. 1 tbsp in liquid, Disp: , Rfl:     Trulicity 0.75 mg/0.5 mL pen injector, Inject 0.75 mg under the skin 1 (one) time per week., Disp: , Rfl:   No current facility-administered medications for this visit.    Allergy:  Allergies   Allergen Reactions    Aspirin Anaphylaxis    Topiramate Anxiety and Other     delirium    Salicylates Swelling     Tolerates ibuprofen    Penicillins Hives, Itching and Swelling    Lidopatch [Lidocaine-Menthol] Itching and Rash        Exam  Testicles descended bilaterally, nontender, no masses  Vasa palpable bilaterally  Penis circ'd, no lesions, no plaques      Assessment/Plan  #infertility  I counseled the patient in detail regarding infertility, specifically the male component. We reviewed different causes of male infertility as well as options to optimize male fertility, to different surgical interventions and assisted reproductive technologies.    Semen analysis with strict morphology x 2.  Fertility Panel:   serum Estradiol, FSH + LH, serum Prolactin, Testosterone   I have scheduled a scrotal ultrasound to better  evaluate the testicles, epididymis, and spermatic cords.  Karyotype and y-chromosome microdeletion   Evaluate post-ejaculatory urine / retrograde analysis  CFTR testing  Semen culture  DNA fragmentation    Fu after     Scribe Attestation  By signing my name below, I, Ramila Grimm, attest that this documentation  has been prepared under the direction and in the presence of Colby Ruiz MD.

## 2024-03-11 NOTE — PROGRESS NOTES
Patient here for evaluation of left shoulder pain and stiffness of the left shoulder has had an injection in the shoulder previously did not help.  He has had therapy and is not making progress.  He is diabetic.    The patient is pleasant and cooperative.  The patient is alert and oriented ×3.  Auditory function is intact.  The patient is a good historian.  The patient is not in acute distress.  Eye exam significant for nonicteric sclera, intact ocular muscle movement.  Breathing is rhythmic symmetric and nonlabored.  Left radial pulse palpable brisk cap refill light touch sensation intact integument of the left upper extremity is intact with no lesions scars laceration abrasions or contusions no peripheral edema  strength 5/5.  Active shoulder range of motion elevation 90 external rotation in abduction left 70 degrees internal rotation and abduction 45 degrees with pain at extremes of range of motion motor power is well-preserved abduction internal/external rotation graded 5/5 in each direction.    X-rays were obtained and reviewed they show preservation subacromial glenohumeral space no fracture dislocation subluxation or arthritic degenerative changes.    Adhesive capsulitis    We discussed options for treatment including potential benefit of intra-articular injection.  It has been very helpful for him but he understands that it could increase his blood sugar and he understands to check his sugar today.  I have asked the patient to follow-up again in about 2 to 3 weeks he will continue physical therapy in the interim.      L Inj/Asp: L glenohumeral on 3/11/2024 9:29 AM  Indications: pain  Details: 22 G needle, posterior approach  Medications: 40 mg triamcinolone acetonide 40 mg/mL; 4 mL lidocaine 10 mg/mL (1 %)  Procedure, treatment alternatives, risks and benefits explained, specific risks discussed. Consent was given by the patient. Immediately prior to procedure a time out was called to verify the correct  patient, procedure, equipment, support staff and site/side marked as required.

## 2024-03-11 NOTE — PROGRESS NOTES
"Treatment note  Physical Therapy Treatment  Patient Name:Tip Lynn  MRN:[id number]  Today's Date:3/11/2024  Referred by: Nallely Mendosa  Time Calculation  Start Time: 1025  Stop Time: 1120  Time Calculation (min): 55 min    Therapy Diagnosis  1. Impingement of shoulder    2. Chronic left shoulder pain    3. Difficulty walking         Assessment: Patient ambulates into clinic today without use of rollator and no gait deviations. He was somewhat compliant with his HEP yesterday. Primary focus on active and active assisted ROM of the L shoulder today. Requires visual and verbal cuing to avoid compensation when attempting exercises. Patient limited with all FIR/ER exercises and ROM.     Plan: Continue to progress with active range of motion of L shoulder and initiate manual therapy PROM and GH joint mobs next session.     Insurance  Visit number: 2  Approved number of visits: 12  Onset Date: 1/15/2024  Certification Period:  Beginning: 3/8/2024        Precautions/Fall Risk:fall risk mod Pacemaker no  Seizures Yes  Post Op Movement/Restrictions No    Subjective/Pain: Patient arrives early to his appoint since he was coming from another Dr. Appointment where he said he was given an injection in his shoulder. He said he is hurting a bit today. He reports the doctor said he needs to move his arm or he might have to do surgery.   Current Pain Level (0-10): 8    HEP Compliance: Fair    Objective/Outcome Measures:  AROM ER: 10 degrees    Treatment  Therapeutic Exercise 45 minutes  Recumbent Stepper: 7 min  Pulley's flex/abd: 3'/3'   Wall ladder + Lift: x 10   TB row: (green) 2x10   Supine Cane CP/Flexion/ ER (3#) 2x10  Mod Pec s: 30\" x 3   FIR strap stretch: 30\"x 3       Modalities   Vasopneumatic Device     10 minutes    Goals:  Upper Extremity Goals  At time of discharge, the patient will:  1) Perform HEP independently within the clinic to show adherence to initial program for symptom management.  2) Patient will " report a reduction in pain at its worst from 8/10 to 3/10 to allow independent performance of essential ADLs and iADLs at [facility].  3) Demonstrate proper scapular position and posture for improved positional tolerances to sitting and standing > 1 hr  4) Increase ROM of L shoulder by 50% all planes in order to improve the ability to perform essential OH, self-care and ADLs  5) Decrease score of Quick Dash by > 8 points to meet MCID, demonstrating a clinically significant improvement in function.  7) Patient will demonstrate Apley reach to L2 of affected shoulder to don/doff clothing without difficulty.  8) Patient will demonstrate functional strength of the affected shoulder to perform lift of 8-12 pounds for groceries and laundry duties.   9) Patient will demonstrate pain free strength of 4+ or >/5 for all affected shoulder groups for return to unrestricted lifting activities.  Lower Extremity Goals  Lower Extremity Goals: By discharge, patient will:  1) Demonstrate independence with home exercise program for overall symptom management  2) Increase overall exercise tolerance without adverse reaction or increased chief complaint  4) Increase strength of the B LE to 5/5 in order to improve the ability to perform essential ADLs  5) Report decrease in pain by >= 2 points to meet MCID  7) Ascend and descend 1 flight of stairs reciprocally and safely without an increase in symptoms  8) Ambulate x 5-10 min without assistive device without an increase in symptoms    Patient stated goal: reduce pain and regain use of arms, decrease need for assistive device and reduce risk of falls

## 2024-03-12 ENCOUNTER — APPOINTMENT (OUTPATIENT)
Dept: UROLOGY | Facility: CLINIC | Age: 54
End: 2024-03-12
Payer: COMMERCIAL

## 2024-03-15 DIAGNOSIS — M12.812 ROTATOR CUFF ARTHROPATHY OF LEFT SHOULDER: ICD-10-CM

## 2024-03-15 DIAGNOSIS — M10.9 ACUTE GOUT, UNSPECIFIED CAUSE, UNSPECIFIED SITE: Primary | ICD-10-CM

## 2024-03-15 DIAGNOSIS — M1A.9XX0 CHRONIC GOUT WITHOUT TOPHUS, UNSPECIFIED CAUSE, UNSPECIFIED SITE: Primary | ICD-10-CM

## 2024-03-15 DIAGNOSIS — M1A.0790 CHRONIC IDIOPATHIC GOUT INVOLVING TOE WITHOUT TOPHUS, UNSPECIFIED LATERALITY: ICD-10-CM

## 2024-03-15 RX ORDER — ALLOPURINOL 100 MG/1
100 TABLET ORAL DAILY
Qty: 30 TABLET | Refills: 3 | Status: SHIPPED | OUTPATIENT
Start: 2024-03-15 | End: 2024-05-20 | Stop reason: WASHOUT

## 2024-03-15 RX ORDER — ALLOPURINOL 300 MG/1
300 TABLET ORAL DAILY
Qty: 30 TABLET | Refills: 3 | Status: SHIPPED | OUTPATIENT
Start: 2024-03-15 | End: 2024-05-20 | Stop reason: WASHOUT

## 2024-03-15 RX ORDER — ALLOPURINOL 100 MG/1
TABLET ORAL
Qty: 120 TABLET | Refills: 5 | Status: SHIPPED | OUTPATIENT
Start: 2024-03-15 | End: 2024-05-20 | Stop reason: WASHOUT

## 2024-03-18 ENCOUNTER — OFFICE VISIT (OUTPATIENT)
Dept: PRIMARY CARE | Facility: CLINIC | Age: 54
End: 2024-03-18
Payer: COMMERCIAL

## 2024-03-18 ENCOUNTER — APPOINTMENT (OUTPATIENT)
Dept: UROLOGY | Facility: HOSPITAL | Age: 54
End: 2024-03-18
Payer: COMMERCIAL

## 2024-03-18 VITALS
WEIGHT: 232 LBS | TEMPERATURE: 96.5 F | BODY MASS INDEX: 32.48 KG/M2 | HEART RATE: 64 BPM | SYSTOLIC BLOOD PRESSURE: 136 MMHG | DIASTOLIC BLOOD PRESSURE: 88 MMHG | HEIGHT: 71 IN | OXYGEN SATURATION: 97 %

## 2024-03-18 DIAGNOSIS — J45.40 MODERATE PERSISTENT ASTHMA WITHOUT COMPLICATION (HHS-HCC): ICD-10-CM

## 2024-03-18 DIAGNOSIS — E11.9 TYPE 2 DIABETES MELLITUS WITHOUT COMPLICATION, WITHOUT LONG-TERM CURRENT USE OF INSULIN (MULTI): Primary | ICD-10-CM

## 2024-03-18 PROCEDURE — 99213 OFFICE O/P EST LOW 20 MIN: CPT

## 2024-03-18 PROCEDURE — 3079F DIAST BP 80-89 MM HG: CPT

## 2024-03-18 PROCEDURE — 3075F SYST BP GE 130 - 139MM HG: CPT

## 2024-03-18 PROCEDURE — 4010F ACE/ARB THERAPY RXD/TAKEN: CPT

## 2024-03-18 PROCEDURE — 1036F TOBACCO NON-USER: CPT

## 2024-03-18 PROCEDURE — RXMED WILLOW AMBULATORY MEDICATION CHARGE

## 2024-03-18 RX ORDER — BUDESONIDE AND FORMOTEROL FUMARATE DIHYDRATE 80; 4.5 UG/1; UG/1
2 AEROSOL RESPIRATORY (INHALATION)
Qty: 10.2 G | Refills: 2 | Status: SHIPPED | OUTPATIENT
Start: 2024-03-18

## 2024-03-18 RX ORDER — FLASH GLUCOSE SENSOR
KIT MISCELLANEOUS
Qty: 1 EACH | Refills: 0 | Status: CANCELLED | OUTPATIENT
Start: 2024-03-18

## 2024-03-18 RX ORDER — FLASH GLUCOSE SENSOR
KIT MISCELLANEOUS
Qty: 1 EACH | Refills: 0 | Status: SHIPPED | OUTPATIENT
Start: 2024-03-18 | End: 2024-04-29 | Stop reason: SDUPTHER

## 2024-03-18 ASSESSMENT — PAIN SCALES - GENERAL: PAINLEVEL: 5

## 2024-03-18 ASSESSMENT — ENCOUNTER SYMPTOMS
ENDOCRINE NEGATIVE: 1
SHORTNESS OF BREATH: 1
NEUROLOGICAL NEGATIVE: 1
ARTHRALGIAS: 1
APPETITE CHANGE: 1
CARDIOVASCULAR NEGATIVE: 1
GASTROINTESTINAL NEGATIVE: 1

## 2024-03-18 NOTE — PROGRESS NOTES
Precepting Note  Tip Lynn  [id number]    Patient was seen in [facility] residency clinic today as part of a multidisciplinary teaching team. I participated in this patient's care as a quintin precepting physician.    52 yo F w/ frozen shoulder and asthmax, not controlled. Advised to use meloxicam for pain control and follow w/ Rheum. Advised on controller tx with Symbicort.  CARLOS, advised to use CPAP.    Agreeable with plan as discussed with resident Christ Weston DO and attending provider, Dr. Oglesby.      Beatriz Dumont MD  Family Medicine, PGY-3

## 2024-03-18 NOTE — PROGRESS NOTES
Subjective   Patient ID: Tip Lynn is a 53 y.o. male who presents for follow-up visit.    HPI   Hx gout, HTN, HLD, asthma, CARLOS on CPAP, T2DM, seizure, Left shoulder RC tendinitis and anxiety     #Left shoulder pain  #Rotator Cuff impingement  #Subacromial bursitis  - Still hurts even though he has been doing his exercises. It hurts especially during the night when he sleeps on it. The medication meloxicam did not help at all. He has been going to PT, but the pain has been the same and not improved. The ROM motion of the left shoulder is limited to 110 degree.     #Moderate persistent asthma  - States that it has not been under control. Takes his albuterol, but only helps on certain occasions. States he experiences asthma attacks a couple times a week. Has not been taking his symbicort at all. Was not aware of it. Stressful situations causes the asthma attacks, and playing with his kids.     #DM2  - Been taking all of his medications. Does not check his blood sugars, states that he doesn't like using his needles. States that his diet has not been good, and hasn't been exercising. Feels tingling on his hands and feet. States that it happens on a rare occasion. States that his appetite has decreased. Would like to be educated on diet    #Seizures  - Since his teenage years. Hasn't had a seizures for a few years. States that he needs to go visit a neurologist.    #Sleep : CARLOS, on CPAP   - Has not been using the CPAP because he doesn't have the water for the device and as a result, he hasn't been sleeping well, and feels sleepy during the day.      Review of Systems   Constitutional:  Positive for appetite change.   HENT: Negative.     Eyes:  Positive for visual disturbance.   Respiratory:  Positive for shortness of breath.    Cardiovascular: Negative.    Gastrointestinal: Negative.    Endocrine: Negative.    Genitourinary: Negative.    Musculoskeletal:  Positive for arthralgias.   Neurological: Negative.   "      Objective   /88 (BP Location: Right arm, Patient Position: Sitting)   Pulse 64   Temp 35.8 °C (96.5 °F)   Ht 1.803 m (5' 11\")   Wt 105 kg (232 lb)   SpO2 97%   BMI 32.36 kg/m²     Physical Exam  Gen: awake and alert, AAOx3  HEENT: normocephalic.  CV: RRR. No murmurs, gallops, rubs  Pulm: clear to auscultation bilaterally. No coarse lung sounds  Abd: soft, non-distended. Normal active bowel sounds  Ext: warm and well-perfused. LE +2 both legs      Assessment/Plan   Problem List Items Addressed This Visit       Asthma    Relevant Medications    budesonide-formoteroL (Symbicort) 80-4.5 mcg/actuation inhaler     Other Visit Diagnoses       Type 2 diabetes mellitus without complication, without long-term current use of insulin (CMS/[facility])    -  Primary    Relevant Medications    FreeStyle Juliette 2 Sensor kit    Other Relevant Orders    Continuous Glucose Monitoring          #Left shoulder pain  #Rotator Cuff impingement  #Subacromial bursitis  - Recommend to the patient to take is meloxicam and continue his PT  - F/U with rheumatology     #Moderate persistent asthma  - Educated the patient on the importance of taking symbicort to help with his asthma attacks     #DM2  - Educated the patient on the importance of montioring his blood sugar levels in the morning.  Ordered continuous blood sugar monitor (juliette)    #Seizures  - F/U with neurology    #Sleep : CARLOS, on CPAP   - Patient was educated on the importance of using his CPAP machine  - Use distill water for you CPAP machine      Jorge Rodriguez, MS3    Patient seen and examined with the medical student noted above. Assessment and plan have been reviewed and I agree with current management.      Christ Weston, DO  Family Medicine PGY-2   "

## 2024-03-18 NOTE — PROGRESS NOTES
Low sperm count?    Scribe Attestation  By signing my name below, I, Ramila Grimm, attest that this documentation  has been prepared under the direction and in the presence of Colby Ruiz MD.     Codi Guerrero(Attending)

## 2024-03-19 ENCOUNTER — TELEPHONE (OUTPATIENT)
Dept: PRIMARY CARE | Facility: CLINIC | Age: 54
End: 2024-03-19

## 2024-03-21 NOTE — PROGRESS NOTES
I reviewed the resident/fellow's documentation and discussed the patient with the resident/fellow. I agree with the resident/fellow's medical decision making as documented in the note.    Mark Oglesby MD

## 2024-03-22 ENCOUNTER — APPOINTMENT (OUTPATIENT)
Dept: PHYSICAL THERAPY | Facility: HOSPITAL | Age: 54
End: 2024-03-22
Payer: COMMERCIAL

## 2024-03-26 ENCOUNTER — PHARMACY VISIT (OUTPATIENT)
Dept: PHARMACY | Facility: CLINIC | Age: 54
End: 2024-03-26
Payer: COMMERCIAL

## 2024-03-26 ENCOUNTER — TREATMENT (OUTPATIENT)
Dept: PHYSICAL THERAPY | Facility: HOSPITAL | Age: 54
End: 2024-03-26
Payer: COMMERCIAL

## 2024-03-26 DIAGNOSIS — M25.512 CHRONIC LEFT SHOULDER PAIN: ICD-10-CM

## 2024-03-26 DIAGNOSIS — G89.29 CHRONIC LEFT SHOULDER PAIN: ICD-10-CM

## 2024-03-26 DIAGNOSIS — M25.819 IMPINGEMENT OF SHOULDER: ICD-10-CM

## 2024-03-26 DIAGNOSIS — R26.2 DIFFICULTY WALKING: ICD-10-CM

## 2024-03-26 PROCEDURE — 97110 THERAPEUTIC EXERCISES: CPT | Mod: GP | Performed by: PHYSICAL THERAPIST

## 2024-03-26 PROCEDURE — 97016 VASOPNEUMATIC DEVICE THERAPY: CPT | Mod: GP | Performed by: PHYSICAL THERAPIST

## 2024-03-26 PROCEDURE — 97140 MANUAL THERAPY 1/> REGIONS: CPT | Mod: GP | Performed by: PHYSICAL THERAPIST

## 2024-03-26 NOTE — PROGRESS NOTES
"Treatment note  Physical Therapy Treatment  Patient Name:Tip Lynn  MRN:[id number]  Today's Date:3/26/2024  Referred by: Nallely Mendosa  Time Calculation  Start Time: 0945  Stop Time: 1036  Time Calculation (min): 51 min    Therapy Diagnosis  1. Impingement of shoulder    2. Chronic left shoulder pain    3. Difficulty walking         Assessment: Patient continues to ambulate without difficulty. He starts to have pain and difficulty in L shoulder when reaching between  deg. Patient continues to remain most limited into ER and FIR. Focus of treatment on improving AROM in all planes to regain independence and tolerance of ADLs, self-care, and sleeping. Patient guarded and resistive during L shoulder PROM today, limited in all planes.    Plan: Continue to progress with active range of motion of L shoulder and initiate manual therapy PROM and GH joint mobs next session.     Insurance  Visit number: 3  Approved number of visits: 12  Onset Date: 1/15/2024  Certification Period:  Beginning: 3/8/2024        Precautions/Fall Risk:fall risk mod Pacemaker no  Seizures Yes  Post Op Movement/Restrictions No    Subjective/Pain: Patient reports pain preventing him from sleeping/laying on that side. He said he is hurting a bit today. He reports difficulty putting on deodorant and donning/doffing jacket.    Current Pain Level (0-10): 2    HEP Compliance: Fair    Objective/Outcome Measures:  AROM ER: 10 degrees    Treatment  Therapeutic Exercise 31 minutes  Pulley's flex/abd: 4'  Wall ladder + Lift: (flexion/abd) x 5   Supine Horizontal  ABD/ADD: x 10   Supine Cane CP/Flexion/ ER (3#) 2x10  Mod Pec/Biceps s: 30\" x 3   FIR strap stretch: 30\"x 3     Manual Therapy:  10 minutes  PROM to L shoulder, GH joint mobilization, all planes in supine to promote ROM     Modalities   Vasopneumatic Device     10 minutes, L shoulder 34 deg min compression    Goals:  Upper Extremity Goals  At time of discharge, the patient will:  1) " Perform HEP independently within the clinic to show adherence to initial program for symptom management.  2) Patient will report a reduction in pain at its worst from 8/10 to 3/10 to allow independent performance of essential ADLs and iADLs at [facility].  3) Demonstrate proper scapular position and posture for improved positional tolerances to sitting and standing > 1 hr  4) Increase ROM of L shoulder by 50% all planes in order to improve the ability to perform essential OH, self-care and ADLs  5) Decrease score of Quick Dash by > 8 points to meet MCID, demonstrating a clinically significant improvement in function.  7) Patient will demonstrate Apley reach to L2 of affected shoulder to don/doff clothing without difficulty.  8) Patient will demonstrate functional strength of the affected shoulder to perform lift of 8-12 pounds for groceries and laundry duties.   9) Patient will demonstrate pain free strength of 4+ or >/5 for all affected shoulder groups for return to unrestricted lifting activities.  Lower Extremity Goals  Lower Extremity Goals: By discharge, patient will:  1) Demonstrate independence with home exercise program for overall symptom management  2) Increase overall exercise tolerance without adverse reaction or increased chief complaint  4) Increase strength of the B LE to 5/5 in order to improve the ability to perform essential ADLs  5) Report decrease in pain by >= 2 points to meet MCID  7) Ascend and descend 1 flight of stairs reciprocally and safely without an increase in symptoms  8) Ambulate x 5-10 min without assistive device without an increase in symptoms    Patient stated goal: reduce pain and regain use of arms, decrease need for assistive device and reduce risk of falls

## 2024-03-29 NOTE — TELEPHONE ENCOUNTER
Patient called and requested for a disability placard he says that he has pain in his ankles and can't walk far

## 2024-04-01 ENCOUNTER — TELEMEDICINE (OUTPATIENT)
Dept: PRIMARY CARE | Facility: CLINIC | Age: 54
End: 2024-04-01
Payer: COMMERCIAL

## 2024-04-01 VITALS — HEIGHT: 71 IN | BODY MASS INDEX: 32.36 KG/M2

## 2024-04-01 DIAGNOSIS — M75.02 ADHESIVE CAPSULITIS OF LEFT SHOULDER: ICD-10-CM

## 2024-04-01 DIAGNOSIS — F41.8 ANXIETY WITH SOMATIC FEATURES: Primary | ICD-10-CM

## 2024-04-01 PROCEDURE — 4010F ACE/ARB THERAPY RXD/TAKEN: CPT

## 2024-04-01 PROCEDURE — 99213 OFFICE O/P EST LOW 20 MIN: CPT

## 2024-04-01 PROCEDURE — 1036F TOBACCO NON-USER: CPT

## 2024-04-01 RX ORDER — DICLOFENAC SODIUM 10 MG/G
4 GEL TOPICAL 4 TIMES DAILY
Qty: 100 G | Refills: 1 | Status: SHIPPED | OUTPATIENT
Start: 2024-04-01 | End: 2024-04-14

## 2024-04-01 RX ORDER — MELOXICAM 15 MG/1
15 TABLET ORAL DAILY
Qty: 30 TABLET | Refills: 0 | Status: SHIPPED | OUTPATIENT
Start: 2024-04-01 | End: 2024-05-01

## 2024-04-01 ASSESSMENT — ENCOUNTER SYMPTOMS
OCCASIONAL FEELINGS OF UNSTEADINESS: 1
LOSS OF SENSATION IN FEET: 1
DEPRESSION: 1

## 2024-04-01 ASSESSMENT — PAIN SCALES - GENERAL: PAINLEVEL: 0-NO PAIN

## 2024-04-01 NOTE — PROGRESS NOTES
"The patient verified their identity with their date of birth and verbally consented to evaluation and management of their condition through telemedicine. This visit was performed through Telemedicine during the COVID-19 Health Crisis during a state of National Emergency. This visit was a synchronous telemedicine service initiated by the patient using real-time audio-video two-way communication. The patient is aware that we will bill their insurance for this visit following Medicare and Private/Commercial Payor guidelines, but they may be responsible for some or all of the visit charges if their insurance deems this \"non-covered.\"    Subjective   Patient ID: Tip Lynn is a 53 y.o. male who presents for Follow-up.    HPI  Hx gout, HTN, HLD, asthma, CARLOS on CPAP, T2DM, seizure, and anxiety     #Left shoulder pain  #Rotator Cuff impingement  #Subacromial bursitis  - reports difficulty completing PT due to pain  - states ibuprofen does help with his symptoms    #Stress  - family asking for money. wants to get away from people. Go on vacation  - looking for car so he can take a trip  - talks to Religious people about his feelings  - does not want psychiatrist right now however agreeable to counselor     Previous history  Past Medical History:   Diagnosis Date    Cough, unspecified 03/22/2017    Cough    Myalgia, other site 12/19/2022    Chronic musculoskeletal pain    Other disorders of lung     Lung trouble    Personal history of other diseases of the circulatory system     History of coronary artery disease    Personal history of other diseases of the circulatory system     History of hypertension    Personal history of other diseases of the nervous system and sense organs     History of sleep apnea    Personal history of other specified conditions     History of chest pain    Personal history of other specified conditions     History of heartburn    Unspecified convulsions (CMS/[facility]) 11/14/2022    Seizure     Past " Surgical History:   Procedure Laterality Date    ANKLE SURGERY  03/24/2014    Ankle Surgery    CT ANGIO CORONARY ART WITH HEARTFLOW IF SCORE >30%  3/14/2020    CT HEART CORONARY ANGIOGRAM 3/14/2020 [facility]    OTHER SURGICAL HISTORY  04/25/2019    Ear pressure equalization tube insertion    RECTAL POLYPECTOMY  06/30/2016    Rectal Surgery Polypectomy     Social History     Tobacco Use    Smoking status: Never    Smokeless tobacco: Never   Substance Use Topics    Alcohol use: Yes    Drug use: Never     Family History   Problem Relation Name Age of Onset    Lung cancer Mother      Coronary artery disease Father       Allergies   Allergen Reactions    Aspirin Anaphylaxis    Topiramate Anxiety and Other     delirium    Salicylates Swelling     Tolerates ibuprofen    Penicillins Hives, Itching and Swelling    Lidopatch [Lidocaine-Menthol] Itching and Rash     Current Outpatient Medications   Medication Instructions    acetaminophen (TYLENOL ARTHRITIS PAIN) 650 mg, oral, 3 times daily PRN    acetaminophen (TYLENOL) 1,000 mg, oral, Every 6 hours    albuterol 90 mcg/actuation inhaler 1 puff, inhalation, Every 6 hours PRN    allopurinol (Zyloprim) 100 mg tablet TAKE 1 TABLET BY MOUTH ONCE DAILY TOTAL DAILY DOSE IS 400MG IN COMBINATION WITH 300MG    allopurinol (Zyloprim) 100 mg tablet TAKE 1 TABLET BY MOUTH ONCE DAILY    allopurinol (Zyloprim) 100 mg tablet Clarification order: patient is to take 400mg once daily. Take 1 tablet of 300mg and 1 tablet of 100mg    allopurinol (Zyloprim) 300 mg tablet TAKE 1 TABLET BY MOUTH ONCE DAILY    allopurinol (ZYLOPRIM) 100 mg, oral, Daily    allopurinol (ZYLOPRIM) 300 mg, oral, Daily    amLODIPine (Norvasc) 10 mg tablet 1 tablet, oral, Daily, As directed    amLODIPine (NORVASC) 5 mg, oral, Daily    atorvastatin (LIPITOR) 40 mg, oral, Nightly    blood sugar diagnostic strip TEST FOUR TIMES A DAY    budesonide-formoteroL (Symbicort) 80-4.5 mcg/actuation inhaler 2 puffs,  inhalation, 2 times daily RT, Rinse mouth with water after use to reduce aftertaste and incidence of candidiasis. Do not swallow.    calcium carbonate (Tums Ultra) 400 mg calcium (1,000 mg) chewable tablet 1 tablet, oral, Every 2 hour PRN    ciclopirox (Penlac) 8 % solution Topical, Weekly, GENTLY MASSAGE INTO L big toenail daily to top, under and around nail. wipe off with alcohol and trim.    clindamycin (CLEOCIN) 300 mg, oral, 3 times daily    clotrimazole (Lotrimin) 1 % cream 1 Application, Topical, 2 times daily, To affected area    colchicine 0.6 mg, oral, Daily    diclofenac sodium (VOLTAREN) 4 g, Topical, 4 times daily    docusate sodium (COLACE) 100 mg, oral, 2 times daily    Easy Touch Alcohol Prep Pads pads, medicated 3 times daily, Use as directed    escitalopram (LEXAPRO) 10 mg, oral, Daily    FreeStyle Juliette 2 Sensor kit Use as instructed    FreeStyle Lite Strips strip Daily PRN, Use as directed    hydroCHLOROthiazide (HYDRODiuril) 25 mg tablet 1 tablet, oral, Daily    hydroCHLOROthiazide (MICROZIDE) 12.5 mg, oral, Daily    hydrocortisone 2.5 % lotion Topical, 2 times daily, To rash    ibuprofen 800 mg tablet 1 tablet, oral, Every 6 hours    ibuprofen 600 mg, oral, Every 8 hours PRN    LISINOPRIL ORAL oral    lisinopriL-hydrochlorothiazide 10-12.5 mg tablet 1 tablet, oral, Daily    meclizine (ANTIVERT) 25 mg, oral, 3 times daily PRN    melatonin 3 mg capsule 1 capsule, oral, Nightly PRN    meloxicam (MOBIC) 15 mg, oral, Daily    metFORMIN (GLUCOPHAGE) 500 mg, oral, 2 times daily with meals, Morning and evening meals    naproxen (Naprosyn) 500 mg tablet oral, Every 12 hours    OneTouch Delica Plus Lancet 30 gauge misc 3 times daily, As directed    OneTouch Ultra Test strip 2 times daily    OneTouch UltraSoft Lancets misc 2 times daily    Ozempic 0.25 mg, subcutaneous, Weekly    pantoprazole (PROTONIX) 40 mg, oral, Daily    polyethylene glycol (GLYCOLAX, MIRALAX) 17 g, oral, Daily, IN 8 OUNCES OF LIQUID  AND DRINK; TITRATE TO 1 BOWEL MOVEMENT PER DAY<BR>    psyllium (Konsyl) 6 gram packet oral, Daily, 1-3 tsp - mix with 8oz of water    psyllium (Metamucil, sugar,) powder oral, Daily, 1 tbsp in liquid    Trulicity 0.75 mg/0.5 mL pen injector 1 Pen, subcutaneous, Weekly       Objective       Physical Exam  Psychiatric:         Mood and Affect: Mood normal.         Behavior: Behavior normal.       Assessment/Plan   Tip Lynn is a 53 y.o. male who presents for the concerns below:    Problem List Items Addressed This Visit       Anxiety with somatic features - Primary    Other Relevant Orders    Referral to [facility]     Other Visit Diagnoses       Adhesive capsulitis of left shoulder        Relevant Medications    meloxicam (Mobic) 15 mg tablet    diclofenac sodium (Voltaren) 1 % gel          #Left shoulder pain  #Rotator Cuff impingement  #Subacromial bursitis  - start dicolfenc gel and meloxicam 15mg every day   - continue with PT  - Follow up with sports medicine for repeat injections  - Follow up with rheumatology    #Stress  - refer to counseling services    Discussed with: Dr. Oglesby  Return in : 1 month    Portions of this note were generated using digital voice recognition software, and may contain grammatical errors       Christ Weston, DO  PGY-2, Family Medicine

## 2024-04-03 ENCOUNTER — APPOINTMENT (OUTPATIENT)
Dept: ENDOCRINOLOGY | Facility: CLINIC | Age: 54
End: 2024-04-03
Payer: COMMERCIAL

## 2024-04-03 NOTE — PROGRESS NOTES
History of Present Illness  Mr. ALVAREZ is a 53 year year old male with a history of gout, HTN, HLD, asthma, CARLOS, T2DM, seizure, who presents for weight management.     Virtual visit:  T2DM : since 2020  has had gradual weight gain since then.  weight has been stable the past year.  hasn't ever tried in the past to lose weight.  He does all the shopping and cooking.  at home has two kids : 17 and 13y/o.     Nutrition: working on Mediterranean diet, eating more salads     Exercise: not as much      Stress: managed      Goal: continue eating with salads            Current Outpatient Medications:     acetaminophen (Tylenol Arthritis Pain) 650 mg ER tablet, Take 1 tablet (650 mg) by mouth 3 times a day as needed (back pain)., Disp: , Rfl:     acetaminophen (Tylenol) 500 mg tablet, Take 2 tablets (1,000 mg) by mouth every 6 hours., Disp: , Rfl:     albuterol 90 mcg/actuation inhaler, Inhale 1 puff every 6 hours if needed for wheezing or shortness of breath., Disp: 18 g, Rfl: 3    allopurinol (Zyloprim) 100 mg tablet, TAKE 1 TABLET BY MOUTH ONCE DAILY TOTAL DAILY DOSE IS 400MG IN COMBINATION WITH 300MG, Disp: 90 tablet, Rfl: 2    allopurinol (Zyloprim) 100 mg tablet, TAKE 1 TABLET BY MOUTH ONCE DAILY, Disp: 90 tablet, Rfl: 2    allopurinol (Zyloprim) 100 mg tablet, Take 1 tablet (100 mg) by mouth once daily., Disp: 30 tablet, Rfl: 3    allopurinol (Zyloprim) 100 mg tablet, Clarification order: patient is to take 400mg once daily. Take 1 tablet of 300mg and 1 tablet of 100mg, Disp: 120 tablet, Rfl: 5    allopurinol (Zyloprim) 300 mg tablet, TAKE 1 TABLET BY MOUTH ONCE DAILY, Disp: 90 tablet, Rfl: 1    allopurinol (Zyloprim) 300 mg tablet, Take 1 tablet (300 mg) by mouth once daily., Disp: 30 tablet, Rfl: 3    amLODIPine (Norvasc) 10 mg tablet, Take 1 tablet (10 mg) by mouth once daily. As directed, Disp: , Rfl:     amLODIPine (Norvasc) 5 mg tablet, Take 1 tablet (5 mg) by mouth once daily., Disp: , Rfl:     atorvastatin  (Lipitor) 40 mg tablet, Take 1 tablet (40 mg) by mouth once daily at bedtime., Disp: 90 tablet, Rfl: 3    blood sugar diagnostic strip, TEST FOUR TIMES A DAY, Disp: 100 each, Rfl: 6    budesonide-formoteroL (Symbicort) 80-4.5 mcg/actuation inhaler, Inhale 2 puffs 2 times a day. Rinse mouth with water after use to reduce aftertaste and incidence of candidiasis. Do not swallow., Disp: 10.2 g, Rfl: 2    calcium carbonate (Tums Ultra) 400 mg calcium (1,000 mg) chewable tablet, Chew 1 tablet (1,000 mg) every 2 hours if needed for indigestion (antacid)., Disp: , Rfl:     ciclopirox (Penlac) 8 % solution, Apply topically 1 (one) time per week. GENTLY MASSAGE INTO L big toenail daily to top, under and around nail. wipe off with alcohol and trim., Disp: , Rfl:     clindamycin (Cleocin) 300 mg capsule, Take 1 capsule (300 mg) by mouth 3 times a day., Disp: , Rfl:     clotrimazole (Lotrimin) 1 % cream, Apply 1 Application topically twice a day. To affected area, Disp: , Rfl:     colchicine, gout, 0.6 mg tablet, Take 1 tablet (0.6 mg) by mouth once daily., Disp: , Rfl:     diclofenac sodium (Voltaren) 1 % gel, Apply 4.5 inches (4 g) topically 4 times a day for 13 days., Disp: 100 g, Rfl: 1    docusate sodium (Colace) 100 mg capsule, Take 1 capsule (100 mg) by mouth 2 times a day., Disp: , Rfl:     Easy Touch Alcohol Prep Pads pads, medicated, 3 times a day. Use as directed, Disp: , Rfl:     escitalopram (Lexapro) 10 mg tablet, Take 1 tablet (10 mg) by mouth once daily., Disp: , Rfl:     FreeStyle Juliette 2 Sensor kit, Use as instructed, Disp: 1 each, Rfl: 0    FreeStyle Lite Strips strip, once daily as needed. Use as directed, Disp: , Rfl:     hydroCHLOROthiazide (HYDRODiuril) 12.5 mg tablet, Take 1 tablet (12.5 mg) by mouth once daily., Disp: , Rfl:     hydroCHLOROthiazide (HYDRODiuril) 25 mg tablet, Take 1 tablet (25 mg) by mouth once daily., Disp: , Rfl:     hydrocortisone 2.5 % lotion, Apply topically 2 times a day. To rash,  Disp: , Rfl:     ibuprofen 600 mg tablet, Take 1 tablet (600 mg) by mouth every 8 hours if needed (pain)., Disp: , Rfl:     ibuprofen 800 mg tablet, Take 1 tablet (800 mg) by mouth every 6 hours., Disp: , Rfl:     LISINOPRIL ORAL, Take by mouth., Disp: , Rfl:     lisinopriL-hydrochlorothiazide 10-12.5 mg tablet, Take 1 tablet by mouth once daily., Disp: , Rfl:     meclizine (Antivert) 25 mg tablet, Take 1 tablet (25 mg) by mouth 3 times a day as needed for nausea or dizziness., Disp: , Rfl:     melatonin 3 mg capsule, Take 1 capsule by mouth as needed at bedtime., Disp: , Rfl:     meloxicam (Mobic) 15 mg tablet, Take 1 tablet (15 mg) by mouth once daily., Disp: 30 tablet, Rfl: 0    metFORMIN (Glucophage) 500 mg tablet, Take 1 tablet (500 mg) by mouth 2 times a day with meals. Morning and evening meals, Disp: , Rfl:     naproxen (Naprosyn) 500 mg tablet, Take by mouth every 12 hours., Disp: , Rfl:     OneTouch Delica Plus Lancet 30 gauge misc, 3 times a day. As directed, Disp: , Rfl:     OneTouch Ultra Test strip, 2 times a day., Disp: , Rfl:     OneTouch UltraSoft Lancets misc, 2 times a day., Disp: , Rfl:     Ozempic 0.25 mg or 0.5 mg (2 mg/3 mL) pen injector, Inject 0.25 mg under the skin 1 (one) time per week., Disp: , Rfl:     pantoprazole (ProtoNix) 40 mg EC tablet, Take 1 tablet (40 mg) by mouth once daily., Disp: 30 tablet, Rfl: 1    polyethylene glycol (Glycolax, Miralax) 17 gram/dose powder, Take 17 g by mouth once daily. IN 8 OUNCES OF LIQUID AND DRINK; TITRATE TO 1 BOWEL MOVEMENT PER DAY, Disp: , Rfl:     psyllium (Konsyl) 6 gram packet, Take by mouth once daily. 1-3 tsp - mix with 8oz of water, Disp: , Rfl:     psyllium (Metamucil, sugar,) powder, Take by mouth once daily. 1 tbsp in liquid, Disp: , Rfl:     Trulicity 0.75 mg/0.5 mL pen injector, Inject 0.75 mg under the skin 1 (one) time per week., Disp: , Rfl:     ROS:  System: normal  Eyes : no visual changes  Neck : no tenderness, no new  lumps/bumps  Respiratory : no SOB  Cardiovascular : no chest pain, no palpitations  Gastro-Intestinal : no abdominal concerns  Neurological : no numbness or tingling in the extremities  Musculoskeletal : no joint paint, no muscle pain  Skin : no unusual rashes  Psychiatric : no depression, no anxiety  See HPI for Endocrine ROS    Past Medical History:   Diagnosis Date    Cough, unspecified 03/22/2017    Cough    Myalgia, other site 12/19/2022    Chronic musculoskeletal pain    Other disorders of lung     Lung trouble    Personal history of other diseases of the circulatory system     History of coronary artery disease    Personal history of other diseases of the circulatory system     History of hypertension    Personal history of other diseases of the nervous system and sense organs     History of sleep apnea    Personal history of other specified conditions     History of chest pain    Personal history of other specified conditions     History of heartburn    Unspecified convulsions (CMS/HCC) 11/14/2022    Seizure       Past Surgical History:   Procedure Laterality Date    ANKLE SURGERY  03/24/2014    Ankle Surgery    CT ANGIO CORONARY ART WITH HEARTFLOW IF SCORE >30%  3/14/2020    CT HEART CORONARY ANGIOGRAM 3[place]14[place]2020 [facility] [place]    OTHER SURGICAL HISTORY  04/25/2019    Ear pressure equalization tube insertion    RECTAL POLYPECTOMY  06/30/2016    Rectal Surgery Polypectomy       Social History     Socioeconomic History    Marital status: Single     Spouse name: Not on file    Number of children: Not on file    Years of education: Not on file    Highest education level: Not on file   Occupational History    Not on file   Tobacco Use    Smoking status: Never    Smokeless tobacco: Never   Substance and Sexual Activity    Alcohol use: Yes    Drug use: Never    Sexual activity: Not on file   Other Topics Concern    Not on file   Social History Narrative    Not on file     Social Determinants of Health      Financial Resource Strain: Not on file   Food Insecurity: Not on file   Transportation Needs: Not on file   Physical Activity: Not on file   Stress: Not on file   Social Connections: Not on file   Intimate Partner Violence: Not on file   Housing Stability: Not on file       Objective    Physical Exam:  There were no vitals taken for this visit.  General : alert and oriented X3, no acute distress  Eyes : EOMI     Provider Impressions        Mr. ALVAREZ is a 53 year year old male with a history of gout, HTN, HLD, asthma, CARLOS, T2DM, seizure, who presents for weight management.     T2DM : since 2020  has had gradual weight gain since then.  weight has been stable the past year.  hasn't ever tried in the past to lose weight.  He does all the shopping and cooking.  at home has two kids : 17 and 13y/o.     1. Weight Management : Reviewed the principles of energy metabolism, caloric intake and expenditure, and rationale for a treatment program. Also reinforced need for reduced calorie, low fat diet and increased physical activity.     We reviewed the possibility of starting an interdisciplinary lifestyle intervention program involving improvement of the diet, a personalized exercise program, efforts to reduce the stress and the possibility of using appetite suppressant medications in an effort to help with the weight loss process. The patient expressed interest in the plan.     2. Nutrition : I discussed trying one of the diet approaches we have here in the program : Mediterranean lifestyle, ketosis diet.  consult to [facility] for the Medit Diet. Did not Show to Keely's visit -- will re-schedule.     5/10/23 : 248lb, 34.66kg/m2  7/18/23: 222.6lbs, 31.02kg/m2  9/12/23: 204lb, 28.45kg/m2     3. Sleep : CARLOS, on CPAP     4. Stress : lives with son 17 yrs old and 12 year old niece (has taken care of niece since birth), works part time as . is single.     5. Exercise : run on the treadmill every 2 weeks for 30-40  minutes   has a [employer] membership -- encouraged he try to go twice per week.     6. Appetite : stress and boredom eating      7. DM2 : on metformin  discussed adding a GLP-1 = he denies h/o pancreatitis or personal/family history of MTC.  on Ozempic 0.5mg/wk, is on Caresource. Sent to [facility].  For ongoing DM care- have him see Jesús.     8. Goal : more chest exercises.     FU in an individual visit next time.  FU in a group visit.  Jens Monteiro MD

## 2024-04-04 ENCOUNTER — OFFICE VISIT (OUTPATIENT)
Dept: PRIMARY CARE | Facility: CLINIC | Age: 54
End: 2024-04-04
Payer: COMMERCIAL

## 2024-04-04 VITALS
OXYGEN SATURATION: 96 % | BODY MASS INDEX: 34.73 KG/M2 | SYSTOLIC BLOOD PRESSURE: 158 MMHG | HEIGHT: 69 IN | DIASTOLIC BLOOD PRESSURE: 112 MMHG | WEIGHT: 234.5 LBS | HEART RATE: 78 BPM | TEMPERATURE: 97.1 F

## 2024-04-04 DIAGNOSIS — F41.8 ANXIETY WITH SOMATIC FEATURES: Primary | ICD-10-CM

## 2024-04-04 DIAGNOSIS — M25.512 CHRONIC LEFT SHOULDER PAIN: ICD-10-CM

## 2024-04-04 DIAGNOSIS — G89.29 CHRONIC LEFT SHOULDER PAIN: ICD-10-CM

## 2024-04-04 PROCEDURE — 1036F TOBACCO NON-USER: CPT

## 2024-04-04 PROCEDURE — 3080F DIAST BP >= 90 MM HG: CPT

## 2024-04-04 PROCEDURE — 3077F SYST BP >= 140 MM HG: CPT

## 2024-04-04 PROCEDURE — 4010F ACE/ARB THERAPY RXD/TAKEN: CPT

## 2024-04-04 PROCEDURE — 99213 OFFICE O/P EST LOW 20 MIN: CPT

## 2024-04-04 ASSESSMENT — PAIN SCALES - GENERAL: PAINLEVEL: 0-NO PAIN

## 2024-04-04 NOTE — LETTER
April 4, 2024     Patient: Tip Lynn   YOB: 1970   Date of Visit: 4/4/2024       To Whom It May Concern:    I am writing to you on behalf of Tip Lynn, a patient under my care, to strongly recommend the consideration of a service dog as part of their treatment plan. He has been experiencing significant challenges in managing their daily life due to the symptoms associated with their condition.    As a medical professional, I have closely monitored Mr. Lynn's condition and have observed the limitations they face in performing basic activities of daily living independently. Patient set up to undergo other treatment modalities as well.    I firmly believe that integrating a service dog into Mr. Lynn's life could provide invaluable support and assistance. Service dogs are trained to perform specific tasks tailored to the individual's needs, such as offering emotional support.    I am available to provide any additional information or documentation required to support this recommendation. Please feel free to contact me if you have any questions or need further clarification.     Sincerely,         Christ Weston, DO

## 2024-04-04 NOTE — PROGRESS NOTES
Subjective   Patient ID: Tip Lynn is a 53 y.o. male who presents for Follow-up (Pt states the metformin is making me sick / med refills ).    HPI  #Left shoulder pain  #Rotator Cuff impingement  #Subacromial bursitis  - start dicolfenc gel and meloxicam 15mg every day   - continue with PT  - Follow up with sports medicine for repeat injections  - Follow up with rheumatology     #Stress  - refer to counseling services  - was contacted by services and is to set up counseling    Review of Systems    Previous history  Past Medical History:   Diagnosis Date    Cough, unspecified 03/22/2017    Cough    Myalgia, other site 12/19/2022    Chronic musculoskeletal pain    Other disorders of lung     Lung trouble    Personal history of other diseases of the circulatory system     History of coronary artery disease    Personal history of other diseases of the circulatory system     History of hypertension    Personal history of other diseases of the nervous system and sense organs     History of sleep apnea    Personal history of other specified conditions     History of chest pain    Personal history of other specified conditions     History of heartburn    Unspecified convulsions (CMS/HCC) 11/14/2022    Seizure     Past Surgical History:   Procedure Laterality Date    ANKLE SURGERY  03/24/2014    Ankle Surgery    CT ANGIO CORONARY ART WITH HEARTFLOW IF SCORE >30%  3/14/2020    CT HEART CORONARY ANGIOGRAM [place]14[place] [facility] [place]    OTHER SURGICAL HISTORY  04/25/2019    Ear pressure equalization tube insertion    RECTAL POLYPECTOMY  06/30/2016    Rectal Surgery Polypectomy     Social History     Tobacco Use    Smoking status: Never    Smokeless tobacco: Never   Substance Use Topics    Alcohol use: Yes    Drug use: Never     Family History   Problem Relation Name Age of Onset    Lung cancer Mother      Coronary artery disease Father       Allergies   Allergen Reactions    Aspirin Anaphylaxis    Topiramate Anxiety  and Other     delirium    Salicylates Swelling     Tolerates ibuprofen    Penicillins Hives, Itching and Swelling    Lidopatch [Lidocaine-Menthol] Itching and Rash     Current Outpatient Medications   Medication Instructions    acetaminophen (TYLENOL ARTHRITIS PAIN) 650 mg, oral, 3 times daily PRN    acetaminophen (TYLENOL) 1,000 mg, oral, Every 6 hours    albuterol 90 mcg/actuation inhaler 1 puff, inhalation, Every 6 hours PRN    allopurinol (Zyloprim) 100 mg tablet TAKE 1 TABLET BY MOUTH ONCE DAILY TOTAL DAILY DOSE IS 400MG IN COMBINATION WITH 300MG    allopurinol (Zyloprim) 100 mg tablet TAKE 1 TABLET BY MOUTH ONCE DAILY    allopurinol (Zyloprim) 100 mg tablet Clarification order: patient is to take 400mg once daily. Take 1 tablet of 300mg and 1 tablet of 100mg    allopurinol (Zyloprim) 300 mg tablet TAKE 1 TABLET BY MOUTH ONCE DAILY    allopurinol (ZYLOPRIM) 100 mg, oral, Daily    allopurinol (ZYLOPRIM) 300 mg, oral, Daily    amLODIPine (Norvasc) 10 mg tablet 1 tablet, oral, Daily, As directed    amLODIPine (NORVASC) 5 mg, oral, Daily    atorvastatin (LIPITOR) 40 mg, oral, Nightly    blood sugar diagnostic strip TEST FOUR TIMES A DAY    budesonide-formoteroL (Symbicort) 80-4.5 mcg/actuation inhaler 2 puffs, inhalation, 2 times daily RT, Rinse mouth with water after use to reduce aftertaste and incidence of candidiasis. Do not swallow.    calcium carbonate (Tums Ultra) 400 mg calcium (1,000 mg) chewable tablet 1 tablet, oral, Every 2 hour PRN    ciclopirox (Penlac) 8 % solution Topical, Weekly, GENTLY MASSAGE INTO L big toenail daily to top, under and around nail. wipe off with alcohol and trim.    clindamycin (CLEOCIN) 300 mg, oral, 3 times daily    clotrimazole (Lotrimin) 1 % cream 1 Application, Topical, 2 times daily, To affected area    colchicine 0.6 mg, oral, Daily    diclofenac sodium (VOLTAREN) 4 g, Topical, 4 times daily    docusate sodium (COLACE) 100 mg, oral, 2 times daily    Easy Touch Alcohol Prep  Pads pads, medicated 3 times daily, Use as directed    escitalopram (LEXAPRO) 10 mg, oral, Daily    FreeStyle Juliette 2 Sensor kit Use as instructed    FreeStyle Lite Strips strip Daily PRN, Use as directed    hydroCHLOROthiazide (HYDRODiuril) 25 mg tablet 1 tablet, oral, Daily    hydroCHLOROthiazide (MICROZIDE) 12.5 mg, oral, Daily    hydrocortisone 2.5 % lotion Topical, 2 times daily, To rash    ibuprofen 800 mg tablet 1 tablet, oral, Every 6 hours    ibuprofen 600 mg, oral, Every 8 hours PRN    LISINOPRIL ORAL oral    lisinopriL-hydrochlorothiazide 10-12.5 mg tablet 1 tablet, oral, Daily    meclizine (ANTIVERT) 25 mg, oral, 3 times daily PRN    melatonin 3 mg capsule 1 capsule, oral, Nightly PRN    meloxicam (MOBIC) 15 mg, oral, Daily    metFORMIN (GLUCOPHAGE) 500 mg, oral, 2 times daily with meals, Morning and evening meals    naproxen (Naprosyn) 500 mg tablet oral, Every 12 hours    OneTouch Delica Plus Lancet 30 gauge misc 3 times daily, As directed    OneTouch Ultra Test strip 2 times daily    OneTouch UltraSoft Lancets misc 2 times daily    Ozempic 0.25 mg, subcutaneous, Weekly    pantoprazole (PROTONIX) 40 mg, oral, Daily    polyethylene glycol (GLYCOLAX, MIRALAX) 17 g, oral, Daily, IN 8 OUNCES OF LIQUID AND DRINK; TITRATE TO 1 BOWEL MOVEMENT PER DAY<BR>    psyllium (Konsyl) 6 gram packet oral, Daily, 1-3 tsp - mix with 8oz of water    psyllium (Metamucil, sugar,) powder oral, Daily, 1 tbsp in liquid    Trulicity 0.75 mg/0.5 mL pen injector 1 Pen, subcutaneous, Weekly       Objective       Physical Exam  HENT:      Head: Normocephalic and atraumatic.   Eyes:      General: No scleral icterus.     Conjunctiva/sclera: Conjunctivae normal.   Cardiovascular:      Rate and Rhythm: Normal rate and regular rhythm.      Heart sounds: No murmur heard.     No friction rub. No gallop.   Pulmonary:      Effort: Pulmonary effort is normal. No respiratory distress.      Breath sounds: Normal breath sounds.   Abdominal:       General: There is no distension.      Palpations: Abdomen is soft.      Tenderness: There is no abdominal tenderness.   Skin:     General: Skin is warm and dry.   Neurological:      General: No focal deficit present.      Mental Status: He is alert and oriented to person, place, and time.   Psychiatric:         Mood and Affect: Mood normal.           Assessment/Plan   Tip Lynn is a 53 y.o. male who presents for the concerns below:    Problem List Items Addressed This Visit       Anxiety with somatic features - Primary    Chronic left shoulder pain     #Left shoulder pain  - c/w dicolfenc gel and meloxicam 15mg every day   - continue with PT  - Follow up with sports medicine for repeat injections  - Follow up with rheumatology    #Anxiety  #Stress  - Follow up with counseling services  - After visit was completed, writer notified that patient also requesting emotional support dog. A letter was provided to patient regarding this issue       Return in : 3 months    Portions of this note were generated using digital voice recognition software, and may contain grammatical errors       Christ Weston, DO  PGY-2, Family Medicine

## 2024-04-08 NOTE — PROGRESS NOTES
HPI:  53 y.o. yo male  referred to me by self for infertility    -reports erections are working very well, sex drive and libido are great  -not currently trying for pregnancy, would like to get sperm checked to see if his counts are sufficient to have children  -previous partner passed away, no current partner   -no trouble with pregnancy with current child   -would like to have another child     - wants fertility testing, and wants to know about having kids, 'nothing else going on down there'  -has not had any fertility testing done in the past  -wants more kids  -currently has 1 son  -not actively having sex/trying to conceive  -denies hx of trauma, toxins, chemicals, torsion, varicocele, denies all  hx        Previous pregnancies for either partner: 1 child    Previous Labs:     Lab Results   Component Value Date    TESTOSTERONE 287 09/27/2021       PMH:  Past Medical History:   Diagnosis Date    Cough, unspecified 03/22/2017    Cough    Myalgia, other site 12/19/2022    Chronic musculoskeletal pain    Other disorders of lung     Lung trouble    Personal history of other diseases of the circulatory system     History of coronary artery disease    Personal history of other diseases of the circulatory system     History of hypertension    Personal history of other diseases of the nervous system and sense organs     History of sleep apnea    Personal history of other specified conditions     History of chest pain    Personal history of other specified conditions     History of heartburn    Unspecified convulsions (CMS/HCC) 11/14/2022    Seizure        PSH:  Past Surgical History:   Procedure Laterality Date    ANKLE SURGERY  03/24/2014    Ankle Surgery    CT ANGIO CORONARY ART WITH HEARTFLOW IF SCORE >30%  3/14/2020    CT HEART CORONARY ANGIOGRAM 3/14/2020 [facility] [place] [facility]GENCY [facility]    OTHER SURGICAL HISTORY  04/25/2019    Ear pressure equalization tube insertion    RECTAL POLYPECTOMY  06/30/2016    Rectal Surgery  Polypectomy        Medications:    Current Outpatient Medications:     acetaminophen (Tylenol Arthritis Pain) 650 mg ER tablet, Take 1 tablet (650 mg) by mouth 3 times a day as needed (back pain)., Disp: , Rfl:     acetaminophen (Tylenol) 500 mg tablet, Take 2 tablets (1,000 mg) by mouth every 6 hours., Disp: , Rfl:     albuterol 90 mcg/actuation inhaler, Inhale 1 puff every 6 hours if needed for wheezing or shortness of breath., Disp: 18 g, Rfl: 3    allopurinol (Zyloprim) 100 mg tablet, TAKE 1 TABLET BY MOUTH ONCE DAILY TOTAL DAILY DOSE IS 400MG IN COMBINATION WITH 300MG, Disp: 90 tablet, Rfl: 2    allopurinol (Zyloprim) 100 mg tablet, TAKE 1 TABLET BY MOUTH ONCE DAILY, Disp: 90 tablet, Rfl: 2    allopurinol (Zyloprim) 100 mg tablet, Take 1 tablet (100 mg) by mouth once daily., Disp: 30 tablet, Rfl: 3    allopurinol (Zyloprim) 100 mg tablet, Clarification order: patient is to take 400mg once daily. Take 1 tablet of 300mg and 1 tablet of 100mg, Disp: 120 tablet, Rfl: 5    allopurinol (Zyloprim) 300 mg tablet, TAKE 1 TABLET BY MOUTH ONCE DAILY, Disp: 90 tablet, Rfl: 1    allopurinol (Zyloprim) 300 mg tablet, Take 1 tablet (300 mg) by mouth once daily., Disp: 30 tablet, Rfl: 3    amLODIPine (Norvasc) 10 mg tablet, Take 1 tablet (10 mg) by mouth once daily. As directed, Disp: , Rfl:     amLODIPine (Norvasc) 5 mg tablet, Take 1 tablet (5 mg) by mouth once daily., Disp: , Rfl:     atorvastatin (Lipitor) 40 mg tablet, Take 1 tablet (40 mg) by mouth once daily at bedtime., Disp: 90 tablet, Rfl: 3    blood sugar diagnostic strip, TEST FOUR TIMES A DAY, Disp: 100 each, Rfl: 6    budesonide-formoteroL (Symbicort) 80-4.5 mcg/actuation inhaler, Inhale 2 puffs 2 times a day. Rinse mouth with water after use to reduce aftertaste and incidence of candidiasis. Do not swallow., Disp: 10.2 g, Rfl: 2    calcium carbonate (Tums Ultra) 400 mg calcium (1,000 mg) chewable tablet, Chew 1 tablet (1,000 mg) every 2 hours if needed for  indigestion (antacid)., Disp: , Rfl:     ciclopirox (Penlac) 8 % solution, Apply topically 1 (one) time per week. GENTLY MASSAGE INTO L big toenail daily to top, under and around nail. wipe off with alcohol and trim., Disp: , Rfl:     clindamycin (Cleocin) 300 mg capsule, Take 1 capsule (300 mg) by mouth 3 times a day., Disp: , Rfl:     clotrimazole (Lotrimin) 1 % cream, Apply 1 Application topically twice a day. To affected area, Disp: , Rfl:     colchicine, gout, 0.6 mg tablet, Take 1 tablet (0.6 mg) by mouth once daily., Disp: , Rfl:     diclofenac sodium (Voltaren) 1 % gel, Apply 4.5 inches (4 g) topically 4 times a day for 13 days., Disp: 100 g, Rfl: 1    docusate sodium (Colace) 100 mg capsule, Take 1 capsule (100 mg) by mouth 2 times a day., Disp: , Rfl:     Easy Touch Alcohol Prep Pads pads, medicated, 3 times a day. Use as directed, Disp: , Rfl:     escitalopram (Lexapro) 10 mg tablet, Take 1 tablet (10 mg) by mouth once daily., Disp: , Rfl:     FreeStyle Juliette 2 Sensor kit, Use as instructed, Disp: 1 each, Rfl: 0    FreeStyle Lite Strips strip, once daily as needed. Use as directed, Disp: , Rfl:     hydroCHLOROthiazide (HYDRODiuril) 12.5 mg tablet, Take 1 tablet (12.5 mg) by mouth once daily., Disp: , Rfl:     hydroCHLOROthiazide (HYDRODiuril) 25 mg tablet, Take 1 tablet (25 mg) by mouth once daily., Disp: , Rfl:     hydrocortisone 2.5 % lotion, Apply topically 2 times a day. To rash, Disp: , Rfl:     ibuprofen 600 mg tablet, Take 1 tablet (600 mg) by mouth every 8 hours if needed (pain)., Disp: , Rfl:     ibuprofen 800 mg tablet, Take 1 tablet (800 mg) by mouth every 6 hours., Disp: , Rfl:     LISINOPRIL ORAL, Take by mouth., Disp: , Rfl:     lisinopriL-hydrochlorothiazide 10-12.5 mg tablet, Take 1 tablet by mouth once daily., Disp: , Rfl:     meclizine (Antivert) 25 mg tablet, Take 1 tablet (25 mg) by mouth 3 times a day as needed for nausea or dizziness., Disp: , Rfl:     melatonin 3 mg capsule, Take 1  capsule by mouth as needed at bedtime., Disp: , Rfl:     meloxicam (Mobic) 15 mg tablet, Take 1 tablet (15 mg) by mouth once daily., Disp: 30 tablet, Rfl: 0    metFORMIN (Glucophage) 500 mg tablet, Take 1 tablet (500 mg) by mouth 2 times a day with meals. Morning and evening meals, Disp: , Rfl:     naproxen (Naprosyn) 500 mg tablet, Take by mouth every 12 hours., Disp: , Rfl:     OneTouch Delica Plus Lancet 30 gauge misc, 3 times a day. As directed, Disp: , Rfl:     OneTouch Ultra Test strip, 2 times a day., Disp: , Rfl:     OneTouch UltraSoft Lancets misc, 2 times a day., Disp: , Rfl:     Ozempic 0.25 mg or 0.5 mg (2 mg/3 mL) pen injector, Inject 0.25 mg under the skin 1 (one) time per week., Disp: , Rfl:     pantoprazole (ProtoNix) 40 mg EC tablet, Take 1 tablet (40 mg) by mouth once daily., Disp: 30 tablet, Rfl: 1    polyethylene glycol (Glycolax, Miralax) 17 gram/dose powder, Take 17 g by mouth once daily. IN 8 OUNCES OF LIQUID AND DRINK; TITRATE TO 1 BOWEL MOVEMENT PER DAY, Disp: , Rfl:     psyllium (Konsyl) 6 gram packet, Take by mouth once daily. 1-3 tsp - mix with 8oz of water, Disp: , Rfl:     psyllium (Metamucil, sugar,) powder, Take by mouth once daily. 1 tbsp in liquid, Disp: , Rfl:     Trulicity 0.75 mg/0.5 mL pen injector, Inject 0.75 mg under the skin 1 (one) time per week., Disp: , Rfl:     Allergy:  Allergies   Allergen Reactions    Aspirin Anaphylaxis    Topiramate Anxiety and Other     delirium    Salicylates Swelling     Tolerates ibuprofen    Penicillins Hives, Itching and Swelling    Lidopatch [Lidocaine-Menthol] Itching and Rash        Exam  Testicles descended bilaterally, nontender, no masses  Vasa palpable bilaterally  Penis circ'd, no lesions, no plaques    Assessment/Plan  #Infertility  I counseled the patient in detail regarding infertility, specifically the male component. We reviewed different causes of male infertility as well as options to optimize male fertility, to different  surgical interventions and assisted reproductive technologies.    -Dicussed that it would not be beneficial to check sperm counts until attempting pregnancy, specifically for 1 year with no success    #Erectile Dysfunction  -Erections work well at this time, will contact if this becomes a problem for him    Alejandro macdonald     Scribe Attestation  By signing my name below, I, Delisa Bridget-Ramila Thibodeaux, attest that this documentation  has been prepared under the direction and in the presence of Colby Ruiz MD.

## 2024-04-10 ENCOUNTER — TREATMENT (OUTPATIENT)
Dept: PHYSICAL THERAPY | Facility: HOSPITAL | Age: 54
End: 2024-04-10
Payer: COMMERCIAL

## 2024-04-10 DIAGNOSIS — M25.512 CHRONIC LEFT SHOULDER PAIN: ICD-10-CM

## 2024-04-10 DIAGNOSIS — R26.2 DIFFICULTY WALKING: ICD-10-CM

## 2024-04-10 DIAGNOSIS — G89.29 CHRONIC LEFT SHOULDER PAIN: ICD-10-CM

## 2024-04-10 DIAGNOSIS — M25.819 IMPINGEMENT OF SHOULDER: ICD-10-CM

## 2024-04-10 PROCEDURE — 97110 THERAPEUTIC EXERCISES: CPT | Mod: GP | Performed by: PHYSICAL THERAPIST

## 2024-04-10 PROCEDURE — 97016 VASOPNEUMATIC DEVICE THERAPY: CPT | Mod: GP | Performed by: PHYSICAL THERAPIST

## 2024-04-10 PROCEDURE — 97140 MANUAL THERAPY 1/> REGIONS: CPT | Mod: GP | Performed by: PHYSICAL THERAPIST

## 2024-04-10 NOTE — PROGRESS NOTES
"Treatment note  Physical Therapy Treatment  Patient Name:Tip Lynn  MRN:[id number]  Today's Date:4/10/2024  Referred by: Nallely Mendosa  Time Calculation  Start Time: 0955  Stop Time: 1050  Time Calculation (min): 55 min    Therapy Diagnosis  1. Impingement of shoulder    2. Chronic left shoulder pain    3. Difficulty walking         Assessment: Patient continues to ambulate without difficulty. Primary focus of treatment remains with the L shoulder. He continues to have the most pain and restriction with OH and behind body reaching.  Patient having increased limitation in all shoulder movements today as compared to previous sessions. Patient guarded and resistive during L shoulder PROM today, limited and painful in all planes. Most limitations into ER.    Plan: Continue to progress with active range of motion of L shoulder and initiate manual therapy PROM and GH joint mobs next session.     Insurance  Visit number: 4  Approved number of visits: 12  Onset Date: 1/15/2024  Certification Period:  Beginning: 3/8/2024        Precautions/Fall Risk:fall risk mod Pacemaker no  Seizures Yes  Post Op Movement/Restrictions No    Subjective/Pain: Patient reports no pain at rest but was 10/10 over the weekend. He reports lots of stiffness. Patient reports he was trying move a couch. He used a heating pad which helped to alleviate the pain.  He  Current Pain Level (0-10): 0    HEP Compliance: Fair    Objective/Outcome Measures:  AROM ER: 10 degrees  PROM flexion: 115 deg with empty end feel and pain    Treatment  Therapeutic Exercise 35 minutes  Pulley's flex/abd: 3' ea  Wall ladder + Lift: (flexion/abd) 2# x 5   Supine Cane CP/Flexion/ ER (3#) 2x10  Supine Cane ABD: (3#) 2x10   Mod Pec/Biceps s: 30\" x 3   FIR strap stretch: 1' x 3     Manual Therapy:  10 minutes  PROM to L shoulder, GH joint mobilization, all planes in supine to promote ROM     Modalities   Vasopneumatic Device     10 minutes, L shoulder 34 deg min " compression    Goals:  Upper Extremity Goals  At time of discharge, the patient will:  1) Perform HEP independently within the clinic to show adherence to initial program for symptom management.  2) Patient will report a reduction in pain at its worst from 8/10 to 3/10 to allow independent performance of essential ADLs and iADLs at [facility].  3) Demonstrate proper scapular position and posture for improved positional tolerances to sitting and standing > 1 hr  4) Increase ROM of L shoulder by 50% all planes in order to improve the ability to perform essential OH, self-care and ADLs  5) Decrease score of Quick Dash by > 8 points to meet MCID, demonstrating a clinically significant improvement in function.  7) Patient will demonstrate Apley reach to L2 of affected shoulder to don/doff clothing without difficulty.  8) Patient will demonstrate functional strength of the affected shoulder to perform lift of 8-12 pounds for groceries and laundry duties.   9) Patient will demonstrate pain free strength of 4+ or >/5 for all affected shoulder groups for return to unrestricted lifting activities.  Lower Extremity Goals (have not addressed)  Lower Extremity Goals: By discharge, patient will:  1) Demonstrate independence with home exercise program for overall symptom management  2) Increase overall exercise tolerance without adverse reaction or increased chief complaint  4) Increase strength of the B LE to 5/5 in order to improve the ability to perform essential ADLs  5) Report decrease in pain by >= 2 points to meet MCID  7) Ascend and descend 1 flight of stairs reciprocally and safely without an increase in symptoms  8) Ambulate x 5-10 min without assistive device without an increase in symptoms    Patient stated goal: reduce pain and regain use of arms, decrease need for assistive device and reduce risk of falls

## 2024-04-15 ENCOUNTER — OFFICE VISIT (OUTPATIENT)
Dept: PODIATRY | Facility: CLINIC | Age: 54
End: 2024-04-15
Payer: COMMERCIAL

## 2024-04-15 ENCOUNTER — OFFICE VISIT (OUTPATIENT)
Dept: UROLOGY | Facility: HOSPITAL | Age: 54
End: 2024-04-15
Payer: COMMERCIAL

## 2024-04-15 DIAGNOSIS — M19.079 1ST MTP ARTHRITIS: ICD-10-CM

## 2024-04-15 DIAGNOSIS — E11.49 TYPE II DIABETES MELLITUS WITH NEUROLOGICAL MANIFESTATIONS (MULTI): ICD-10-CM

## 2024-04-15 DIAGNOSIS — Z31.69 ENCOUNTER FOR INFERTILITY COUNSELING: Primary | ICD-10-CM

## 2024-04-15 DIAGNOSIS — M79.672 PAIN IN BOTH FEET: Primary | ICD-10-CM

## 2024-04-15 DIAGNOSIS — M79.671 PAIN IN BOTH FEET: Primary | ICD-10-CM

## 2024-04-15 DIAGNOSIS — E11.9 ENCOUNTER FOR DIABETIC FOOT EXAM (MULTI): ICD-10-CM

## 2024-04-15 PROBLEM — M12.812 ROTATOR CUFF ARTHROPATHY OF LEFT SHOULDER: Status: ACTIVE | Noted: 2024-04-15

## 2024-04-15 PROBLEM — H61.20 IMPACTED CERUMEN: Status: ACTIVE | Noted: 2024-04-15

## 2024-04-15 PROBLEM — Z86.79 HISTORY OF HYPERTENSION: Status: ACTIVE | Noted: 2024-04-15

## 2024-04-15 PROBLEM — J18.9 COMMUNITY ACQUIRED PNEUMONIA: Status: ACTIVE | Noted: 2024-02-29

## 2024-04-15 PROBLEM — H52.4 PRESBYOPIA: Status: ACTIVE | Noted: 2023-09-26
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
## 2024-04-15 PROBLEM — J30.2 SEASONAL ALLERGIES: Status: ACTIVE | Noted: 2024-04-15

## 2024-04-15 PROBLEM — U07.1 DISEASE DUE TO SEVERE ACUTE RESPIRATORY SYNDROME CORONAVIRUS 2 (SARS-COV-2): Status: ACTIVE | Noted: 2023-09-26

## 2024-04-15 PROBLEM — J30.9 ALLERGIC RHINITIS: Status: ACTIVE | Noted: 2024-04-15

## 2024-04-15 PROBLEM — M75.02 ADHESIVE CAPSULITIS OF LEFT SHOULDER: Status: ACTIVE | Noted: 2024-04-15

## 2024-04-15 PROBLEM — R60.0 EDEMA OF LOWER EXTREMITY: Status: ACTIVE | Noted: 2024-04-15

## 2024-04-15 PROBLEM — M25.9 DISORDER OF SHOULDER: Status: ACTIVE | Noted: 2024-03-08

## 2024-04-15 PROCEDURE — 99213 OFFICE O/P EST LOW 20 MIN: CPT | Performed by: UROLOGY

## 2024-04-15 PROCEDURE — 4010F ACE/ARB THERAPY RXD/TAKEN: CPT | Performed by: UROLOGY

## 2024-04-15 PROCEDURE — 4010F ACE/ARB THERAPY RXD/TAKEN: CPT | Performed by: PODIATRIST

## 2024-04-15 PROCEDURE — 1036F TOBACCO NON-USER: CPT | Performed by: PODIATRIST

## 2024-04-15 PROCEDURE — 99212 OFFICE O/P EST SF 10 MIN: CPT | Performed by: PODIATRIST

## 2024-04-15 RX ORDER — CYCLOBENZAPRINE HCL 5 MG
5 TABLET ORAL
COMMUNITY
Start: 2024-04-05

## 2024-04-15 RX ORDER — VENLAFAXINE HYDROCHLORIDE 37.5 MG/1
37.5 CAPSULE, EXTENDED RELEASE ORAL
COMMUNITY
Start: 2020-09-21

## 2024-04-15 RX ORDER — TRAZODONE HYDROCHLORIDE 100 MG/1
TABLET ORAL
COMMUNITY
Start: 2024-04-05

## 2024-04-15 NOTE — PROGRESS NOTES
NPV  - wants fertility testing, and wants to know about having kids, 'nothing else going on down there'  -has not had any fertility testing done in the past  -wants more kids  -currently has 1 son  -not actively having sex/trying to conceive  -denies hx of trauma, toxins, chemicals, torsion, varicocele, denies all  hx

## 2024-04-15 NOTE — PROGRESS NOTES
History of Present Illness:   Patient states they are here for Dm exam  Denies NTB to feet  Most recent A1C is 6.0  Needs new script for DM shoes -   Has concerns of left thick nail    Past Medical History  Past Medical History:   Diagnosis Date    Cough, unspecified 03/22/2017    Cough    Myalgia, other site 12/19/2022    Chronic musculoskeletal pain    Other disorders of lung     Lung trouble    Personal history of other diseases of the circulatory system     History of coronary artery disease    Personal history of other diseases of the circulatory system     History of hypertension    Personal history of other diseases of the nervous system and sense organs     History of sleep apnea    Personal history of other specified conditions     History of chest pain    Personal history of other specified conditions     History of heartburn    Unspecified convulsions (CMS/HCC) 11/14/2022    Seizure       Medications and Allergies have been reviewed.    Review Of Systems:  GENERAL: No weight loss, malaise or fevers.  HEENT: Negative for frequent or significant headaches,   RESPIRATORY: Negative for cough, wheezing or shortness of breath.  CARDIOVASCULAR: Negative for chest pain, leg swelling or palpitations.    Examination of Both Lower Extremities:   Objective:   Vasc: DP and PT pulses are palpable bilateral.  CFT is less than 3 seconds bilateral.  Skin temperature is warm to cool proximal to distal bilateral.      Neuro: Vibratory, light touch and proprioception are intact bilateral.  Protective sensation is intact to the foot when tested with the 5.07 SWM bilateral.     Derm: Nails 1-5 bilateral are intact thick and discolored.  Skin is supple with normal texture and turgor noted.  Webspaces are clean, dry and intact bilateral.      Ortho: Muscle strength is 5/5 for all pedal groups tested.    1. 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 Type II diabetes mellitus with neurological manifestations (Multi)         4. Encounter for diabetic foot exam (Multi)          Patient educated on proper diabetic foot care.  Nails 1-5 b/l were debrided in thickness and length with nail cutting forceps.  Discussed and deferred left hallux avulsion.   Keep filed down  A1C revd. 6.0  Re printed rx for DM shoes for patient  List of vendors  Low pedal risk noted at this time  Patient to follow up in 12 mos or sooner if any problems arise.   Patient was in agreement to this plan. All questions answered.      Jannette Birch DPM  [phone]  Option 2  Fax: [phone]

## 2024-04-16 ENCOUNTER — OFFICE VISIT (OUTPATIENT)
Dept: CARDIOLOGY | Facility: HOSPITAL | Age: 54
End: 2024-04-16
Payer: COMMERCIAL

## 2024-04-16 ENCOUNTER — TREATMENT (OUTPATIENT)
Dept: PHYSICAL THERAPY | Facility: HOSPITAL | Age: 54
End: 2024-04-16
Payer: COMMERCIAL

## 2024-04-16 VITALS
WEIGHT: 246 LBS | OXYGEN SATURATION: 95 % | SYSTOLIC BLOOD PRESSURE: 131 MMHG | DIASTOLIC BLOOD PRESSURE: 88 MMHG | HEART RATE: 79 BPM | HEIGHT: 71 IN | BODY MASS INDEX: 34.44 KG/M2

## 2024-04-16 DIAGNOSIS — M75.02 ADHESIVE CAPSULITIS OF LEFT SHOULDER: ICD-10-CM

## 2024-04-16 DIAGNOSIS — I10 HYPERTENSION, ESSENTIAL, BENIGN: ICD-10-CM

## 2024-04-16 DIAGNOSIS — M75.82 TENDINITIS OF LEFT ROTATOR CUFF: Primary | ICD-10-CM

## 2024-04-16 DIAGNOSIS — M12.812 ROTATOR CUFF ARTHROPATHY OF LEFT SHOULDER: ICD-10-CM

## 2024-04-16 DIAGNOSIS — R06.09 DYSPNEA ON EXERTION: Primary | ICD-10-CM

## 2024-04-16 DIAGNOSIS — E78.2 MIXED HYPERLIPIDEMIA: ICD-10-CM

## 2024-04-16 PROCEDURE — 97110 THERAPEUTIC EXERCISES: CPT | Mod: GP | Performed by: PHYSICAL THERAPIST

## 2024-04-16 PROCEDURE — 3079F DIAST BP 80-89 MM HG: CPT | Performed by: INTERNAL MEDICINE

## 2024-04-16 PROCEDURE — 99213 OFFICE O/P EST LOW 20 MIN: CPT | Performed by: INTERNAL MEDICINE

## 2024-04-16 PROCEDURE — 3075F SYST BP GE 130 - 139MM HG: CPT | Performed by: INTERNAL MEDICINE

## 2024-04-16 PROCEDURE — 4010F ACE/ARB THERAPY RXD/TAKEN: CPT | Performed by: INTERNAL MEDICINE

## 2024-04-16 PROCEDURE — 97016 VASOPNEUMATIC DEVICE THERAPY: CPT | Mod: GP | Performed by: PHYSICAL THERAPIST

## 2024-04-16 PROCEDURE — 1036F TOBACCO NON-USER: CPT | Performed by: INTERNAL MEDICINE

## 2024-04-16 ASSESSMENT — PAIN SCALES - GENERAL: PAINLEVEL: 0-NO PAIN

## 2024-04-16 NOTE — PROGRESS NOTES
Subjective   Tip Lynn is a 53 y.o. male who presents to the [facility]  for evaluation of chest pain. Last seen in April 2023.     He has episodes of dyspnea on exertion with jogging 1 mile in 12-15 minutes. Has chest wall discomfort with left shoulder pain and 4 IC cartilage joint pain consistent with costochondritis. Not related to exertion.     Had coronary CTA in March 2020 that did not show obstructive coronary artery lesions or atherosclerosis. Symptoms at that time did not include chest pain.     Prior exertional dyspnea baseline was walking 1-2 flights of stairs or 2-3 blocks at a time.     He has no PND, orthopnea, ARTIS, palpitations, syncope, or claudication.     Past Medical History:  1. Hypertension  2. Dyslipidemia  3. Asthma  4. CARLOS with CPAP mask use     Social History:  Never a smoker.     Family History:  Mother had premature CAD with MI at age 55 yo and developed CHF. Father had CAD with MI at age 61 yo.     Review of Systems    A 14 point review of systems was asked. All questions were negative except for pertinent positives listed in the HPI.     Current Outpatient Medications on File Prior to Visit   Medication Sig Dispense Refill    acetaminophen (Tylenol Arthritis Pain) 650 mg ER tablet Take 1 tablet (650 mg) by mouth 3 times a day as needed (back pain).      acetaminophen (Tylenol) 500 mg tablet Take 2 tablets (1,000 mg) by mouth every 6 hours.      albuterol 90 mcg/actuation inhaler Inhale 1 puff every 6 hours if needed for wheezing or shortness of breath. 18 g 3    allopurinol (Zyloprim) 100 mg tablet TAKE 1 TABLET BY MOUTH ONCE DAILY TOTAL DAILY DOSE IS 400MG IN COMBINATION WITH 300MG 90 tablet 2    allopurinol (Zyloprim) 100 mg tablet TAKE 1 TABLET BY MOUTH ONCE DAILY 90 tablet 2    amLODIPine (Norvasc) 10 mg tablet Take 1 tablet (10 mg) by mouth once daily. As directed      atorvastatin (Lipitor) 40 mg tablet Take 1 tablet (40 mg) by mouth once daily at  bedtime. 90 tablet 3    blood sugar diagnostic strip TEST FOUR TIMES A  each 6    budesonide-formoteroL (Symbicort) 80-4.5 mcg/actuation inhaler Inhale 2 puffs 2 times a day. Rinse mouth with water after use to reduce aftertaste and incidence of candidiasis. Do not swallow. 10.2 g 2    colchicine, gout, 0.6 mg tablet Take 1 tablet (0.6 mg) by mouth once daily.      cyclobenzaprine (Flexeril) 5 mg tablet Take 1 tablet (5 mg) by mouth.      Easy Touch Alcohol Prep Pads pads, medicated 3 times a day. Use as directed      FreeStyle Juliette 2 Sensor kit Use as instructed 1 each 0    FreeStyle Lite Strips strip once daily as needed. Use as directed      hydroCHLOROthiazide (HYDRODiuril) 25 mg tablet Take 1 tablet (25 mg) by mouth once daily.      ibuprofen 600 mg tablet Take 1 tablet (600 mg) by mouth every 8 hours if needed (pain).      LISINOPRIL ORAL Take by mouth.      lisinopriL-hydrochlorothiazide 10-12.5 mg tablet Take 1 tablet by mouth once daily.      meclizine (Antivert) 25 mg tablet Take 1 tablet (25 mg) by mouth 3 times a day as needed for nausea or dizziness.      melatonin 3 mg capsule Take 1 capsule by mouth as needed at bedtime.      metFORMIN (Glucophage) 500 mg tablet Take 1 tablet (500 mg) by mouth 2 times a day with meals. Morning and evening meals      naproxen (Naprosyn) 500 mg tablet Take by mouth every 12 hours.      OneTouch Delica Plus Lancet 30 gauge misc 3 times a day. As directed      OneTouch Ultra Test strip 2 times a day.      OneTouch UltraSoft Lancets misc 2 times a day.      Ozempic 0.25 mg or 0.5 mg (2 mg/3 mL) pen injector Inject 0.25 mg under the skin 1 (one) time per week.      pantoprazole (ProtoNix) 40 mg EC tablet Take 1 tablet (40 mg) by mouth once daily. 30 tablet 1    psyllium (Konsyl) 6 gram packet Take by mouth once daily. 1-3 tsp - mix with 8oz of water      traZODone (Desyrel) 100 mg tablet       Trulicity 0.75 mg/0.5 mL pen injector Inject 0.75 mg under the skin 1  (one) time per week.      venlafaxine XR (Effexor-XR) 37.5 mg 24 hr capsule Take 1 capsule (37.5 mg) by mouth once daily.      allopurinol (Zyloprim) 100 mg tablet Take 1 tablet (100 mg) by mouth once daily. 30 tablet 3    allopurinol (Zyloprim) 100 mg tablet Clarification order: patient is to take 400mg once daily. Take 1 tablet of 300mg and 1 tablet of 100mg 120 tablet 5    allopurinol (Zyloprim) 300 mg tablet TAKE 1 TABLET BY MOUTH ONCE DAILY 90 tablet 1    allopurinol (Zyloprim) 300 mg tablet Take 1 tablet (300 mg) by mouth once daily. 30 tablet 3    amLODIPine (Norvasc) 5 mg tablet Take 1 tablet (5 mg) by mouth once daily.      calcium carbonate (Tums Ultra) 400 mg calcium (1,000 mg) chewable tablet Chew 1 tablet (1,000 mg) every 2 hours if needed for indigestion (antacid).      ciclopirox (Penlac) 8 % solution Apply topically 1 (one) time per week. GENTLY MASSAGE INTO L big toenail daily to top, under and around nail. wipe off with alcohol and trim.      clindamycin (Cleocin) 300 mg capsule Take 1 capsule (300 mg) by mouth 3 times a day.      clotrimazole (Lotrimin) 1 % cream Apply 1 Application topically twice a day. To affected area      [] diclofenac sodium (Voltaren) 1 % gel Apply 4.5 inches (4 g) topically 4 times a day for 13 days. 100 g 1    docusate sodium (Colace) 100 mg capsule Take 1 capsule (100 mg) by mouth 2 times a day.      escitalopram (Lexapro) 10 mg tablet Take 1 tablet (10 mg) by mouth once daily.      hydroCHLOROthiazide (HYDRODiuril) 12.5 mg tablet Take 1 tablet (12.5 mg) by mouth once daily.      hydrocortisone 2.5 % lotion Apply topically 2 times a day. To rash      ibuprofen 800 mg tablet Take 1 tablet (800 mg) by mouth every 6 hours.      meloxicam (Mobic) 15 mg tablet Take 1 tablet (15 mg) by mouth once daily. 30 tablet 0    polyethylene glycol (Glycolax, Miralax) 17 gram/dose powder Take 17 g by mouth once daily. IN 8 OUNCES OF LIQUID AND DRINK; TITRATE TO 1 BOWEL MOVEMENT  "PER DAY      psyllium (Metamucil, sugar,) powder Take by mouth once daily. 1 tbsp in liquid       No current facility-administered medications on file prior to visit.      Objective   Physical Exam  BP Readings from Last 3 Encounters:   24 131/88   24 (!) 158/112   24 136/88      Wt Readings from Last 3 Encounters:   24 112 kg (246 lb)   24 106 kg (234 lb 8 oz)   24 105 kg (232 lb)      BMI: Estimated body mass index is 34.31 kg/m² as calculated from the following:    Height as of this encounter: 1.803 m (5' 11\").    Weight as of this encounter: 112 kg (246 lb).  BSA: Estimated body surface area is 2.37 meters squared as calculated from the following:    Height as of this encounter: 1.803 m (5' 11\").    Weight as of this encounter: 112 kg (246 lb).    General: no acute distress  HEENT: EOMI, no scleral icterus.  Lungs: Clear to auscultation bilaterally without wheezing, rales, or rhonchi.  Cardiovascular: Regular rhythm and rate. Normal S1 and S2. No murmurs, rubs, or gallops are appreciated. JVP normal.  Abdomen: Soft, nontender, nondistended. Bowel sounds present.  Extremities: Warm and well perfused with equal 2+ pulses bilaterally.  No edema present.  Neurologic: Alert and oriented x3.    I have personally reviewed the following images and laboratory findings:  Last echocardiogram:  3/1/2017 echo: LV EF 60%, no LVH (LVMI 63 gm/m2), normal diastology (E/e' 9), normal LA size (DOMINGO 18 ml/m2), normal RV/RA, trivial AI, trace MR, trace TR, unable to estimate RVSP, RAP 3 mm Hg.    Last cath / stress test:  3/14/2020 coronary CTA which demonstrated normal coronary arteries without detected atherosclerotic plaque or artery stenosis. Normal aorta course and caliber.    Most recent EC2023: Sinus rhythm, normal ECG, 79 bpm.    Lab Results   Component Value Date    CHOL 202 (H) 2023    CHOL 134 2022    CHOL 177 2020     Lab Results   Component Value Date    HDL " "39.0 (A) 04/24/2023    HDL 39.2 (A) 07/13/2022    HDL 33.1 (A) 06/16/2020     No results found for: \"LDLCALC\"  Lab Results   Component Value Date    TRIG 204 (H) 07/13/2022    TRIG 289 (H) 06/16/2020    TRIG 202 (H) 07/16/2018     No components found for: \"CHOLHDL\"      Assessment/Plan   1. Chest pain /. Dyspnea on exertion:  Patient with dyspnea on exertion with jogging 1 mile. Has chest wall discomfort on palpation at left 4th intercostal cartilage due to costochondritis.      Will have patient get an echocardiogram to evaluate for signals from a cardiac contribution to exertional dyspnea.     2. CAD risk factors:  Nonsmoker. Blood pressure and cholesterol levels controlled with medications. March 2020 coronary CT angiogram did not show coronary arteriosclerosis at at that time.     Follow up with Dr. Rocha in 12 months if echocardiogram is normal.           SIGNATURE: Jostin Rocha MD PATIENT NAME: Tip Lynn   DATE/TIME: April 16, 2024 1:45 PM MRN: [id number]     "

## 2024-04-16 NOTE — PATIENT INSTRUCTIONS
You were seen in the [facility] for your chest pain and shortness of breath.     Your last ECG looked unchanged from ones going back to 2019. Your heart exam today is normal.     I am ordering a Echocardiogram (ultrasound of the heart) to look at your heart's structure and function for your shortness of breath.     Your chest discomfort is due to costochondritis which is inflammation in the cartilage connecting your bones in your chest wall. You can take Tylenol.     Continue to take your blood pressure and cholesterol medications. Your chest CT in 2020 did not show any narrowed heart arteries or atherosclerotic calcium build up in your heart arteries which is a finding in people at low risk for having a heart attack in the next 10 years.     Follow up with Dr. Rocha in 12 months if your echocardiogram is normal.

## 2024-04-16 NOTE — PROGRESS NOTES
"Treatment note  Physical Therapy Treatment  Patient Name:Tip Lynn  MRN:[id number]  Today's Date:4/16/2024  Referred by: Nallely Mendosa  Time Calculation  Start Time: 1430  Stop Time: 1522  Time Calculation (min): 52 min    Therapy Diagnosis  1. Tendinitis of left rotator cuff    2. Adhesive capsulitis of left shoulder    3. Rotator cuff arthropathy of left shoulder         Assessment: Patient's A/PROM of L shoulder remains consistently limited in all planes. Active and active assisted mobility performed today to promote ROM. Pain and empty end feel. Pec tightness present into ER.     Plan: Continue to progress with active range of motion of L shoulder and initiate manual therapy PROM and GH joint mobs next session.     Insurance  Visit number: 5  Approved number of visits: 12  Onset Date: 1/15/2024  Certification Period:  Beginning: 3/8/2024        Precautions/Fall Risk:fall risk mod Pacemaker no  Seizures Yes  Post Op Movement/Restrictions No    Subjective/Pain: Patient reports he saw the doctor for his shoulder who suggested the possibility of surgery but the patient declines at this time. He reports stiffness in his shoulder making movements such as reaching difficult.  Current Pain Level (0-10): 5    HEP Compliance: Fair    Objective/Outcome Measures:  AROM ER: 10 degrees  PROM flexion: 115 deg with empty end feel and pain    Treatment  Therapeutic Exercise 42 minutes  UE ergo 8' level 2  Pulley's flex/abd: 3' ea  Wall ladder + Lift: (flexion/abd) 2# x 5   Supine Cane CP/Flexion/ ER (3#) 3x10  TB Rows: 3x10 green   PB roll up wall: 5\" x 30     Manual Therapy:  minutes not today  PROM to L shoulder, GH joint mobilization, all planes in supine to promote ROM     Modalities   Vasopneumatic Device     10 minutes, L shoulder 34 deg min compression    Goals:  Upper Extremity Goals  At time of discharge, the patient will:  1) Perform HEP independently within the clinic to show adherence to initial program for " symptom management.  2) Patient will report a reduction in pain at its worst from 8/10 to 3/10 to allow independent performance of essential ADLs and iADLs at [facility].  3) Demonstrate proper scapular position and posture for improved positional tolerances to sitting and standing > 1 hr  4) Increase ROM of L shoulder by 50% all planes in order to improve the ability to perform essential OH, self-care and ADLs  5) Decrease score of Quick Dash by > 8 points to meet MCID, demonstrating a clinically significant improvement in function.  7) Patient will demonstrate Apley reach to L2 of affected shoulder to don/doff clothing without difficulty.  8) Patient will demonstrate functional strength of the affected shoulder to perform lift of 8-12 pounds for groceries and laundry duties.   9) Patient will demonstrate pain free strength of 4+ or >/5 for all affected shoulder groups for return to unrestricted lifting activities.  Lower Extremity Goals (have not addressed)  Lower Extremity Goals: By discharge, patient will:  1) Demonstrate independence with home exercise program for overall symptom management  2) Increase overall exercise tolerance without adverse reaction or increased chief complaint  4) Increase strength of the B LE to 5/5 in order to improve the ability to perform essential ADLs  5) Report decrease in pain by >= 2 points to meet MCID  7) Ascend and descend 1 flight of stairs reciprocally and safely without an increase in symptoms  8) Ambulate x 5-10 min without assistive device without an increase in symptoms    Patient stated goal: reduce pain and regain use of arms, decrease need for assistive device and reduce risk of falls

## 2024-04-22 ENCOUNTER — TELEPHONE (OUTPATIENT)
Dept: PRIMARY CARE | Facility: CLINIC | Age: 54
End: 2024-04-22

## 2024-04-22 ENCOUNTER — OFFICE VISIT (OUTPATIENT)
Dept: RHEUMATOLOGY | Facility: CLINIC | Age: 54
End: 2024-04-22
Payer: COMMERCIAL

## 2024-04-22 VITALS
BODY MASS INDEX: 32.2 KG/M2 | WEIGHT: 230 LBS | TEMPERATURE: 98 F | HEIGHT: 71 IN | DIASTOLIC BLOOD PRESSURE: 82 MMHG | HEART RATE: 82 BPM | SYSTOLIC BLOOD PRESSURE: 135 MMHG

## 2024-04-22 DIAGNOSIS — M1A.9XX0 CHRONIC GOUT WITHOUT TOPHUS, UNSPECIFIED CAUSE, UNSPECIFIED SITE: ICD-10-CM

## 2024-04-22 DIAGNOSIS — M75.02 ADHESIVE CAPSULITIS OF LEFT SHOULDER: Primary | ICD-10-CM

## 2024-04-22 PROCEDURE — 4010F ACE/ARB THERAPY RXD/TAKEN: CPT | Performed by: INTERNAL MEDICINE

## 2024-04-22 PROCEDURE — 3075F SYST BP GE 130 - 139MM HG: CPT | Performed by: INTERNAL MEDICINE

## 2024-04-22 PROCEDURE — 20611 DRAIN/INJ JOINT/BURSA W/US: CPT | Performed by: INTERNAL MEDICINE

## 2024-04-22 PROCEDURE — 3079F DIAST BP 80-89 MM HG: CPT | Performed by: INTERNAL MEDICINE

## 2024-04-22 PROCEDURE — 99214 OFFICE O/P EST MOD 30 MIN: CPT | Performed by: INTERNAL MEDICINE

## 2024-04-22 PROCEDURE — 1036F TOBACCO NON-USER: CPT | Performed by: INTERNAL MEDICINE

## 2024-04-22 RX ORDER — PILOCARPINE HYDROCHLORIDE 5 MG/1
5 TABLET, FILM COATED ORAL 3 TIMES DAILY
Qty: 270 TABLET | Refills: 3 | Status: CANCELLED | OUTPATIENT
Start: 2024-04-22 | End: 2025-04-22

## 2024-04-22 RX ORDER — MYCOPHENOLIC ACID 360 MG/1
720 TABLET, DELAYED RELEASE ORAL 2 TIMES DAILY
Qty: 120 TABLET | Refills: 2 | Status: CANCELLED | OUTPATIENT
Start: 2024-04-22 | End: 2024-07-21

## 2024-04-22 RX ORDER — LIDOCAINE HYDROCHLORIDE 10 MG/ML
1 INJECTION INFILTRATION; PERINEURAL
Status: COMPLETED | OUTPATIENT
Start: 2024-04-22 | End: 2024-04-22

## 2024-04-22 RX ORDER — SILDENAFIL CITRATE 20 MG/1
20 TABLET ORAL 2 TIMES DAILY
Qty: 180 TABLET | Refills: 3 | Status: CANCELLED | OUTPATIENT
Start: 2024-04-22 | End: 2025-04-22

## 2024-04-22 RX ORDER — TRIAMCINOLONE ACETONIDE 40 MG/ML
40 INJECTION, SUSPENSION INTRA-ARTICULAR; INTRAMUSCULAR
Status: COMPLETED | OUTPATIENT
Start: 2024-04-22 | End: 2024-04-22

## 2024-04-22 RX ADMIN — TRIAMCINOLONE ACETONIDE 40 MG: 40 INJECTION, SUSPENSION INTRA-ARTICULAR; INTRAMUSCULAR at 11:23

## 2024-04-22 RX ADMIN — LIDOCAINE HYDROCHLORIDE 1 ML: 10 INJECTION INFILTRATION; PERINEURAL at 11:23

## 2024-04-22 ASSESSMENT — PAIN SCALES - GENERAL: PAINLEVEL: 0-NO PAIN

## 2024-04-22 NOTE — PROGRESS NOTES
Rheumatology Office Visit      Subjective     Patient ID: [id number]     Tip Lynn is a 53 y.o. male who presents for Follow-up (Follow up visit for gout.).    Subjective    52 year old with metabolic syndrome,   Gout, left Rotator cuff impingement and Left Hip TAYLOR presents for follow up     Current meds  Allopurinol 400 mg daily from  on 9/14/2023  Last UA 5.1 mg/dl    RECALL  Mentions his joints have been bothering him since the start of this year.  does not recall any inciting event, has more pain toward the end of the day, mainly in the knees and the right gluteal region.  Does not notice any swelling of the joints or redness.  Pain gets worse with activity, at times laying to his right side also hurts him.  Denies any recent trauma but had trauma to his ankle from a fall many years ago and required internal fixation.  Uses ibuprofen once a day on an average that provides some relief.  Allopurinol started by PCP but no gout episodes. Has high UA levels.  Has been trying to be more active, recently joined a gym, trying to lose weight.     No fevers, chills, sweating, Skin rash, Eye redness, Raynaud's, Skin tightening, Oral or Genital Ulcers  Does not have inflammatory back pain.  No diagnosis of IBD, Uveitis, Psoriasis  No family history of autoimmune issues     Previously has HTN and CARLOS with Pre DM  Prescribed CPAP but thinks it is not working  has daytime somnolence and fatigue         Interval History  worsening of left shoulder pain again , overhead movements in particular , symptoms are getting worse   Doning/doffing is an issue because of limited left arm / shoulder mvoement and some IADL are being affected, has started PT about 6 weeks ago  With minimal improvement in ROM, pain is better  But still has nocturnal awakening when lays on his left side  Mentions he went to orthopod and was advised ?surgery and received a CSI in Jan 20    Otherwise no gout flare   Taking 300+100 allopurinol  On meloxicam  "for shoulder pain -       Review of Systems  Constitutional: Denies fever, chills  Eyes: Denies dry eyes, redness of the eyes, pain in the eyes   ENT: Denies dry mouth, sores in the mouth, poor dentition   Cardiovascular: Denies chest pain, lower extremity edema  Respiratory: Denies shortness of breath, cough, hemoptysis  Gastrointestinal: Denies abdominal pain, N/V/D, dysphagia, odynophagia, heartburn   Integumentary: Denies rash, photosensitivity, nail changes, alopecia  Neurological: Denies any numbness, tingling, focal weakness   Hematologic/Lymphatic: Denies bleeding, easy bruising, Raynaud's phenomena   MSK: As per HPI       Objective  Vitals:    04/22/24 0833   BP: 135/82   BP Location: Right arm   Patient Position: Sitting   BP Cuff Size: Large adult   Pulse: 82   Temp: 36.7 °C (98 °F)   TempSrc: Oral   Weight: 104 kg (230 lb)   Height: 1.803 m (5' 11\")       Physical Exam  General: Cooperative, in NAD   Eyes: Conjunctiva clear, sclera white, anicteric   Nose: No external deformity, no mucosal crusting or signs of bleeding   Throat/Mouth: Moist mucous membranes, normal dentition, no ulcers or lesions   Lungs: No respiratory distress; lungs CTAB; no wheezes, rales, rhonchi, or stridor   Heart: Normal S1 and S2; regular rate and rhythm; no murmurs, rubs, or gallops  Skin: No rashes, ulcers, tophi, or nodules noted  MSK:   Spine: Normal posture, no point tenderness to palpation, normal ROM  Upper Extremities:   Hands: No erythema, warmth, synovitis, or pain of the DIPs, PIPs, or MCPs; normal ROM  Wrists: No erythema, warmth, synovitis, or pain of the wrists; normal ROM  Elbows: No erythema, warmth, synovitis, or pain; normal ROM   Shoulders: there is subacromial tenderness, some tenderness over the ant aspect over the bicipital groove, Left shoulder limited active abudction and flexion to 90 degress  With passive abduction and flexion also restricted to 80 degrees and 70 degrees respectively ( previously around " 120 degrees)  External rotation limited > Internal rotation    Left shoulder Ultrasound  Ultrasound of left shoulder performed again did not show any tear of biceps tendon or evidence of bicipital tendinitis, supraspinatus tendon looks normal without suspicion of tear, infraspinatus or sub scapularis poor visualized because of limited dynamic exam    No fluid around the subacromial bursae seen but there is thickening of subacomial bursa anterior views  Could not visualize the GH joint and Labrum well on the ultrasound with the linear probe    Lower Extremities:   Hips: No gross deformity, erythema, warmth, or pain; normal ROM   Knees: No erythema, warmth, swelling, or pain; normal ROM   Ankles: No erythema, warmth, synovitis, or pain; normal ROM  Feet: No gross deformity, erythema, or swelling; MTP squeeze negative bilaterally    Last BMP:   Lab Results   Component Value Date     09/04/2023    K 4.2 09/04/2023     09/04/2023    CO2 27 09/04/2023    BUN 15 09/04/2023    CREATININE 1.29 09/04/2023    CALCIUM 9.5 09/04/2023     Lab Results   Component Value Date    URICACID 5.1 10/16/2023     Patient ID: Tip Lynn is a 53 y.o. male.    Large Joint Injection/Arthrocentesis: L subacromial bursa on 4/22/2024 11:23 AM  Indications: pain  Details: 25 G needle, ultrasound-guided anterolateral approach  Medications: 1 mL lidocaine 10 mg/mL (1 %); 40 mg triamcinolone acetonide 40 mg/mL    Under direct US guidance, subacromial injection with 40mg kenalog and 1ml lidocaine 1% was injected using anterior approach in modifed CRAS position. Patient tolerated the procedure well  Consent was given by the patient.                   Assessment/Plan   Diagnoses and all orders for this visit:  Chronic gout without tophus, unspecified cause, unspecified site  Now only on ULT without prophylaxis  UA not on target  On Allopurinol 400 mg   Last UA was in the target range  -     Uric Acid; today, will titrate further if  needed    Left shoulder RC tendinitis with impingement  +adhesive capsulitis  No gross GH pathology seen on X-ray and limited views on ultrasound with linear probe  There was thickness in subacromial bursa > 2mm-3mm on ultrasound with Modified CRAS views of supraspinatus.  Minimal improvement with NSAIDs and PT  Subacromial space injected with 40mg Kenalog and 1ml lidocaine 1% using anterior approach with US guidance in modified CRAS position  Patient tolerated the procedure well    Proceeding with MRI L shoulder to pinpoint the etiology rule out RC arthropathy/adhesive capsulitis/labral issues and tendor tears - subscap/infraspinatus not visualized well owing to difficulty with dynamic maneuvers    NO GH effusion seen and xray does not show evidence of GH arthritis.  Continue PT    -  meloxicam (Mobic) 7.5 mg tablet; Take 1 tablet (7.5 mg) by mouth once daily for 14 days. For pain      Follow-up 6 months; earlier for left shoulder if no improvement with PT  Will order MRI left shoulder to look for labral issues, GH pathology, partial tears of RC    Patient seen and discussed with attending Dr. Jeet Demarco MD

## 2024-04-22 NOTE — TELEPHONE ENCOUNTER
Pt came in asking to give him a call regarding his shoulder pain. His metformin is also making him ssick. [phone] thank you!

## 2024-04-23 DIAGNOSIS — E11.9 TYPE 2 DIABETES MELLITUS WITHOUT COMPLICATION, WITHOUT LONG-TERM CURRENT USE OF INSULIN (MULTI): Primary | ICD-10-CM

## 2024-04-26 ENCOUNTER — TELEPHONE (OUTPATIENT)
Dept: RHEUMATOLOGY | Facility: CLINIC | Age: 54
End: 2024-04-26

## 2024-04-26 ENCOUNTER — TREATMENT (OUTPATIENT)
Dept: PHYSICAL THERAPY | Facility: HOSPITAL | Age: 54
End: 2024-04-26
Payer: COMMERCIAL

## 2024-04-26 DIAGNOSIS — M25.819 IMPINGEMENT OF SHOULDER: ICD-10-CM

## 2024-04-26 DIAGNOSIS — G89.29 CHRONIC LEFT SHOULDER PAIN: ICD-10-CM

## 2024-04-26 DIAGNOSIS — R26.2 DIFFICULTY WALKING: ICD-10-CM

## 2024-04-26 DIAGNOSIS — M25.512 CHRONIC LEFT SHOULDER PAIN: ICD-10-CM

## 2024-04-26 PROCEDURE — 97140 MANUAL THERAPY 1/> REGIONS: CPT | Mod: GP | Performed by: PHYSICAL THERAPIST

## 2024-04-26 PROCEDURE — 97110 THERAPEUTIC EXERCISES: CPT | Mod: GP | Performed by: PHYSICAL THERAPIST

## 2024-04-26 PROCEDURE — 97016 VASOPNEUMATIC DEVICE THERAPY: CPT | Mod: GP | Performed by: PHYSICAL THERAPIST

## 2024-04-26 NOTE — PROGRESS NOTES
Treatment note  Physical Therapy Treatment  Patient Name:Tip Lynn  MRN:[id number]  Today's Date:4/26/2024  Referred by: Nallely Mendosa  Time Calculation  Start Time: 0932  Stop Time: 1030  Time Calculation (min): 58 min    Therapy Diagnosis  1. Impingement of shoulder    2. Chronic left shoulder pain    3. Difficulty walking       Assessment: Patient's A/PROM of L shoulder remains consistently limited in all planes. GH PROM and joint mobilization all planes to promote ROM with empty end range due to pain and tissue resistance.  Remainder of treatment focusing on active and active assisted mobility exercises. Exercises continue to require verbal and tactile cuing.     Plan: Continue to progress with active range of motion of L shoulder and initiate manual therapy PROM and GH joint mobs next session.     Insurance  Visit number: 6  Approved number of visits: 12  Onset Date: 1/15/2024  Certification Period:  Beginning: 3/8/2024        Precautions/Fall Risk:fall risk mod Pacemaker no  Seizures Yes  Post Op Movement/Restrictions No    Subjective/Pain: Patient reports no pain at rest. States he saw MD who did an injection last week. He reports no improvements in function or pain since then. He states the doctor also wants to do an MRI.   Current Pain Level (0-10): 0    HEP Compliance: Fair    Objective/Outcome Measures:  AROM in supine:  Flexion: 103 deg with pain and stiffness  ABD: 77 deg with pain and stiffness  ER/IR: 17 deg with pain and stiffness // 9 deg with pain and stiffness    PROM in supine  Flexion: 121 deg with pain and stiffness  ABD: 81 deg with pain and stiffness  ER/IR: 25 // 34 with pain and stiffness    Treatment  Therapeutic Exercise 38 minutes  UE ergo 10' level 3  Pulley's flex/abd: 3' ea  Wall ladder + Lift: (flexion/abd) 2# x 5   Supine Cane CP/Flexion/ ER (4#) 2x10    Manual Therapy: 10 minutes   PROM to L shoulder, GH joint mobilization, all planes in supine to promote ROM     Modalities    Vasopneumatic Device     10 minutes, L shoulder 34 deg min compression    Goals:  Upper Extremity Goals  At time of discharge, the patient will:  1) Perform HEP independently within the clinic to show adherence to initial program for symptom management.  2) Patient will report a reduction in pain at its worst from 8/10 to 3/10 to allow independent performance of essential ADLs and iADLs at [facility].  3) Demonstrate proper scapular position and posture for improved positional tolerances to sitting and standing > 1 hr  4) Increase ROM of L shoulder by 50% all planes in order to improve the ability to perform essential OH, self-care and ADLs  5) Decrease score of Quick Dash by > 8 points to meet MCID, demonstrating a clinically significant improvement in function.  7) Patient will demonstrate Apley reach to L2 of affected shoulder to don/doff clothing without difficulty.  8) Patient will demonstrate functional strength of the affected shoulder to perform lift of 8-12 pounds for groceries and laundry duties.   9) Patient will demonstrate pain free strength of 4+ or >/5 for all affected shoulder groups for return to unrestricted lifting activities.  Lower Extremity Goals (have not addressed)  Lower Extremity Goals: By discharge, patient will:  1) Demonstrate independence with home exercise program for overall symptom management  2) Increase overall exercise tolerance without adverse reaction or increased chief complaint  4) Increase strength of the B LE to 5/5 in order to improve the ability to perform essential ADLs  5) Report decrease in pain by >= 2 points to meet MCID  7) Ascend and descend 1 flight of stairs reciprocally and safely without an increase in symptoms  8) Ambulate x 5-10 min without assistive device without an increase in symptoms    Patient stated goal: reduce pain and regain use of arms, decrease need for assistive device and reduce risk of falls

## 2024-04-29 ENCOUNTER — TELEPHONE (OUTPATIENT)
Dept: RHEUMATOLOGY | Facility: CLINIC | Age: 54
End: 2024-04-29

## 2024-04-29 DIAGNOSIS — M77.8 LEFT SHOULDER TENDONITIS: Primary | ICD-10-CM

## 2024-04-29 RX ORDER — FLASH GLUCOSE SENSOR
KIT MISCELLANEOUS
Qty: 1 EACH | Refills: 11 | Status: SHIPPED | OUTPATIENT
Start: 2024-04-29

## 2024-04-29 RX ORDER — FLASH GLUCOSE SENSOR
KIT MISCELLANEOUS
Qty: 1 EACH | Refills: 0 | Status: SHIPPED | OUTPATIENT
Start: 2024-04-29 | End: 2024-04-29

## 2024-04-29 NOTE — PROGRESS NOTES
Telephone note    #DM2  -Metformin making him sick. Reports nausea/vomiting. Discontinued  - Requesting CGM  - Refer to endocrinology  - c/w trulicity in the interim     #Adhesive capsulitis Left shoulder  - follows with rheumatology  - pending MRI. C/w Meloxicam    Christ Weston, DO  Family Medicine PGY-2

## 2024-04-29 NOTE — TELEPHONE ENCOUNTER
Radiology called and indicated that this patient needs to be scheduled for an MRI, but the order currently says needs device clearance for the patient. They need new orders entered without device clearance as the patient doesn't have a device. Then please call patient to let him know he is good to schedule. Please call Radiology at [phone].

## 2024-04-29 NOTE — TELEPHONE ENCOUNTER
Patient was unsure the process of setting up the MRI. He's never had a MRI and voiced some anxiety. Advised to call radiology scheduling and MRI technician with discuss the process of setting up MRI and checklist or can he can just wait for the  scheduling to call him directly to set it up.

## 2024-05-07 ENCOUNTER — TREATMENT (OUTPATIENT)
Dept: PHYSICAL THERAPY | Facility: HOSPITAL | Age: 54
End: 2024-05-07
Payer: COMMERCIAL

## 2024-05-07 DIAGNOSIS — G89.29 CHRONIC LEFT SHOULDER PAIN: ICD-10-CM

## 2024-05-07 DIAGNOSIS — R26.2 DIFFICULTY WALKING: ICD-10-CM

## 2024-05-07 DIAGNOSIS — M25.819 IMPINGEMENT OF SHOULDER: ICD-10-CM

## 2024-05-07 DIAGNOSIS — M25.512 CHRONIC LEFT SHOULDER PAIN: ICD-10-CM

## 2024-05-07 PROCEDURE — 97140 MANUAL THERAPY 1/> REGIONS: CPT | Mod: GP | Performed by: PHYSICAL THERAPIST

## 2024-05-07 PROCEDURE — 97016 VASOPNEUMATIC DEVICE THERAPY: CPT | Mod: GP | Performed by: PHYSICAL THERAPIST

## 2024-05-07 PROCEDURE — 97110 THERAPEUTIC EXERCISES: CPT | Mod: GP | Performed by: PHYSICAL THERAPIST

## 2024-05-07 NOTE — PROGRESS NOTES
Treatment note  Physical Therapy Treatment  Patient Name:Tip Lynn  MRN:[id number]  Today's Date:5/7/2024  Referred by: Nallely Mendosa  Time Calculation  Start Time: 0927  Stop Time: 1025  Time Calculation (min): 58 min    Therapy Diagnosis  1. Impingement of shoulder    2. Chronic left shoulder pain    3. Difficulty walking       Assessment: Patient's A/PROM of L shoulder remains consistently limited in all planes. GH PROM and joint mobilization all planes to promote ROM with empty end range due to pain and tissue resistance.  Remainder of treatment focusing on active and active assisted mobility exercises. Exercises continue to require verbal and tactile cuing. Patient comes to physical therapy today in a lot of pain all over, he states he woke up with pain all over. He experienced pain this morning after falling asleep in a chair on his left shoulder. He has appointment for MRI on 5/14 for shoulder. Patient ROM appears to improve throughout exercises, but pain remains the same. He experiences increased pain with abduction wall walks so 1# is removed and there is less pain.     Plan: Continue to progress with active range of motion of L shoulder and initiate manual therapy PROM and GH joint mobs next session.     Insurance  Visit number: 6  Approved number of visits: 12  Onset Date: 1/15/2024  Certification Period:  Beginning: 3/8/2024        Precautions/Fall Risk:fall risk mod Pacemaker no  Seizures Yes  Post Op Movement/Restrictions No    Subjective/Pain: Patient reports he is in pain all over and feels pulling and pain along the anterior and posterior of the shoulder. Patient reports pain throughout PROM and exercises and pain over glenohumeral joint with movement but he is not TTP.     Current Pain Level (0-10): 9    HEP Compliance: Fair    Objective/Outcome Measures:  AROM in supine:  Flexion: 103 deg with pain and stiffness  ABD: 77 deg with pain and stiffness  ER/IR: 17 deg with pain and stiffness //  9 deg with pain and stiffness    PROM in supine  Flexion: 121 deg with pain and stiffness  ABD: 81 deg with pain and stiffness  ER/IR: 25 // 34 with pain and stiffness    Treatment  Therapeutic Exercise 48 minutes  UE ergo 7' no resistance  Supine Cane CP/Flexion/ ER (4#) 2x10 each way  Pulley's flex/abd: 3' ea  Wall ladder + Lift: (flexion/abd) 1# x 10 (weight taken off in ABD)      Manual Therapy: 8 minutes   PROM to L shoulder, GH joint mobilization, all planes in supine to promote ROM and provide pain relief.     Modalities   Vasopneumatic Device     10 minutes, L shoulder 34 deg min compression for 10 min.     Goals:  Upper Extremity Goals  At time of discharge, the patient will:  1) Perform HEP independently within the clinic to show adherence to initial program for symptom management.  2) Patient will report a reduction in pain at its worst from 8/10 to 3/10 to allow independent performance of essential ADLs and iADLs at [facility].  3) Demonstrate proper scapular position and posture for improved positional tolerances to sitting and standing > 1 hr  4) Increase ROM of L shoulder by 50% all planes in order to improve the ability to perform essential OH, self-care and ADLs  5) Decrease score of Quick Dash by > 8 points to meet MCID, demonstrating a clinically significant improvement in function.  7) Patient will demonstrate Apley reach to L2 of affected shoulder to don/doff clothing without difficulty.  8) Patient will demonstrate functional strength of the affected shoulder to perform lift of 8-12 pounds for groceries and laundry duties.   9) Patient will demonstrate pain free strength of 4+ or >/5 for all affected shoulder groups for return to unrestricted lifting activities.  Lower Extremity Goals (have not addressed)  Lower Extremity Goals: By discharge, patient will:  1) Demonstrate independence with home exercise program for overall symptom management  2) Increase overall exercise tolerance without adverse  reaction or increased chief complaint  4) Increase strength of the B LE to 5/5 in order to improve the ability to perform essential ADLs  5) Report decrease in pain by >= 2 points to meet MCID  7) Ascend and descend 1 flight of stairs reciprocally and safely without an increase in symptoms  8) Ambulate x 5-10 min without assistive device without an increase in symptoms    Patient stated goal: reduce pain and regain use of arms, decrease need for assistive device and reduce risk of falls

## 2024-05-10 ENCOUNTER — APPOINTMENT (OUTPATIENT)
Dept: CARDIOLOGY | Facility: HOSPITAL | Age: 54
End: 2024-05-10
Payer: COMMERCIAL

## 2024-05-14 ENCOUNTER — HOSPITAL ENCOUNTER (OUTPATIENT)
Dept: RADIOLOGY | Facility: HOSPITAL | Age: 54
Discharge: HOME | End: 2024-05-14
Payer: COMMERCIAL

## 2024-05-14 ENCOUNTER — TREATMENT (OUTPATIENT)
Dept: PHYSICAL THERAPY | Facility: HOSPITAL | Age: 54
End: 2024-05-14
Payer: COMMERCIAL

## 2024-05-14 DIAGNOSIS — M77.8 LEFT SHOULDER TENDONITIS: ICD-10-CM

## 2024-05-14 DIAGNOSIS — R26.2 DIFFICULTY WALKING: ICD-10-CM

## 2024-05-14 DIAGNOSIS — G89.29 CHRONIC LEFT SHOULDER PAIN: ICD-10-CM

## 2024-05-14 DIAGNOSIS — M25.819 IMPINGEMENT OF SHOULDER: ICD-10-CM

## 2024-05-14 DIAGNOSIS — M25.512 CHRONIC LEFT SHOULDER PAIN: ICD-10-CM

## 2024-05-14 PROCEDURE — 73221 MRI JOINT UPR EXTREM W/O DYE: CPT | Mod: LT

## 2024-05-14 PROCEDURE — 73221 MRI JOINT UPR EXTREM W/O DYE: CPT | Mod: LEFT SIDE | Performed by: RADIOLOGY

## 2024-05-14 PROCEDURE — 97140 MANUAL THERAPY 1/> REGIONS: CPT | Mod: GP | Performed by: PHYSICAL THERAPIST

## 2024-05-14 PROCEDURE — 97110 THERAPEUTIC EXERCISES: CPT | Mod: GP | Performed by: PHYSICAL THERAPIST

## 2024-05-14 PROCEDURE — 97016 VASOPNEUMATIC DEVICE THERAPY: CPT | Mod: GP | Performed by: PHYSICAL THERAPIST

## 2024-05-14 NOTE — PROGRESS NOTES
Treatment note  Physical Therapy Treatment  Patient Name:Tip Lynn  MRN:[id number]  Today's Date:5/14/2024  Referred by: Nallely Mendosa  Time Calculation  Start Time: 0818  Stop Time: 0915  Time Calculation (min): 57 min    Therapy Diagnosis  1. Impingement of shoulder    2. Chronic left shoulder pain    3. Difficulty walking       Assessment: Patient arrives to therapy without any pain and states he is doing good until he starts moving his arm. His PROM is limited today and patient appears to be guarded. His ROM remains about the same since last measured. Patient to get MRI of L shoulder today and will reassess with therapy based on results. He requires minimal visual, verbal and tactile cuing for exercises today. He has increased pain throughout the session, especially into the abduction motion. He is instructed to continue to use his arm throughout the day to improve ROM and to not sleep in his chair on his arm.     Plan: Continue to progress with active range of motion of L shoulder and initiate manual therapy PROM and GH joint mobs next session. Reassess based on MRI results.     Insurance  Visit number: 7  Approved number of visits: 12  Onset Date: 1/15/2024  Certification Period:  Beginning: 3/8/2024        Precautions/Fall Risk:fall risk mod Pacemaker no  Seizures Yes  Post Op Movement/Restrictions No    Subjective/Pain: Patient reports he has no pain today and that he feels good until he starts to use his arm. He reports increased pain throughout session and pain during PROM at higher ranges.   Current Pain Level (0-10): 0    HEP Compliance: Poor    Objective/Outcome Measures:  AROM in supine:  Flexion: 105 deg with pain and stiffness  ABD: 76 deg with pain and stiffness  ER/IR: 16 deg with pain and stiffness // 15 deg with pain and stiffness    Treatment  Therapeutic Exercise 37 minutes  UE ergo 10' 2.5 resistance  Supine Cane CP/Flexion/ Abduction (3#) 2x10 each way (1x 10 for abduction)  Pulley's  flex/abd: 3' ea  Wall ladder + Lift: (flexion/abd) x10     Manual Therapy: 10 minutes   PROM to L shoulder all planes in supine to promote ROM and provide pain relief.     Modalities   Vasopneumatic Device     10 minutes, L shoulder 34 deg min compression for 10 min to decrease post exercise pain and any inflammation/swelling that may be present.     Goals:  Upper Extremity Goals  At time of discharge, the patient will:  1) Perform HEP independently within the clinic to show adherence to initial program for symptom management. -Not MET  2) Patient will report a reduction in pain at its worst from 8/10 to 3/10 to allow independent performance of essential ADLs and iADLs at [facility].  3) Demonstrate proper scapular position and posture for improved positional tolerances to sitting and standing > 1 hr -Not met (continued RSFH posture)  4) Increase ROM of L shoulder by 50% all planes in order to improve the ability to perform essential OH, self-care and ADLs  5) Decrease score of Quick Dash by > 8 points to meet MCID, demonstrating a clinically significant improvement in function.  7) Patient will demonstrate Apley reach to L2 of affected shoulder to don/doff clothing without difficulty.  8) Patient will demonstrate functional strength of the affected shoulder to perform lift of 8-12 pounds for groceries and laundry duties.   9) Patient will demonstrate pain free strength of 4+ or >/5 for all affected shoulder groups for return to unrestricted lifting activities. -Not MET    Patient stated goal: reduce pain and regain use of arms, decrease need for assistive device and reduce risk of falls

## 2024-05-20 ENCOUNTER — APPOINTMENT (OUTPATIENT)
Dept: PRIMARY CARE | Facility: CLINIC | Age: 54
End: 2024-05-20
Payer: COMMERCIAL

## 2024-05-20 ENCOUNTER — OFFICE VISIT (OUTPATIENT)
Dept: PRIMARY CARE | Facility: CLINIC | Age: 54
End: 2024-05-20
Payer: COMMERCIAL

## 2024-05-20 VITALS
TEMPERATURE: 96.4 F | BODY MASS INDEX: 32.34 KG/M2 | HEART RATE: 86 BPM | SYSTOLIC BLOOD PRESSURE: 127 MMHG | OXYGEN SATURATION: 97 % | WEIGHT: 231 LBS | DIASTOLIC BLOOD PRESSURE: 82 MMHG | HEIGHT: 71 IN

## 2024-05-20 DIAGNOSIS — M25.512 CHRONIC LEFT SHOULDER PAIN: ICD-10-CM

## 2024-05-20 DIAGNOSIS — E11.9 TYPE 2 DIABETES MELLITUS WITHOUT COMPLICATION, WITHOUT LONG-TERM CURRENT USE OF INSULIN (MULTI): Primary | ICD-10-CM

## 2024-05-20 DIAGNOSIS — R26.89 BALANCE DISORDER: ICD-10-CM

## 2024-05-20 DIAGNOSIS — G89.29 CHRONIC LEFT SHOULDER PAIN: ICD-10-CM

## 2024-05-20 PROCEDURE — 99213 OFFICE O/P EST LOW 20 MIN: CPT

## 2024-05-20 PROCEDURE — 3079F DIAST BP 80-89 MM HG: CPT

## 2024-05-20 PROCEDURE — 3074F SYST BP LT 130 MM HG: CPT

## 2024-05-20 PROCEDURE — 4010F ACE/ARB THERAPY RXD/TAKEN: CPT

## 2024-05-20 RX ORDER — DICLOFENAC SODIUM 10 MG/G
4 GEL TOPICAL 4 TIMES DAILY
Qty: 100 G | Refills: 1 | Status: SHIPPED | OUTPATIENT
Start: 2024-05-20

## 2024-05-20 ASSESSMENT — ENCOUNTER SYMPTOMS: DEPRESSION: 0

## 2024-05-20 ASSESSMENT — PATIENT HEALTH QUESTIONNAIRE - PHQ9
1. LITTLE INTEREST OR PLEASURE IN DOING THINGS: NOT AT ALL
2. FEELING DOWN, DEPRESSED OR HOPELESS: NOT AT ALL
SUM OF ALL RESPONSES TO PHQ9 QUESTIONS 1 AND 2: 0

## 2024-05-20 ASSESSMENT — PAIN SCALES - GENERAL: PAINLEVEL: 10-WORST PAIN EVER

## 2024-05-20 NOTE — PROGRESS NOTES
"Subjective   Patient ID: Tip Lynn is a 53 y.o. male who presents for Follow-up ('my metformin makes me sick, I need to change it').    HPI  \"My balance is off\"  -Feels wobbly when standing  -Has been going on for years  -Comes and goes  -No history of vertigo  -Reports previous diagnosis of epilepsy    Metformin  -Eats with food, but still causes GI upset  -Causes nausea when taken  -Takes with other medications in the \"package\" from [facility] twice a day  -Has been taking 8 or 9 years, GI side effects have been off and on    DM  -Has not been able to get the continuous glucose monitor, but would like to start using it    Lifestyle modification  -Diet: Likes to eat smoothies, duran, salads, fruits, vegetables, ice cream  -Exercise: used to exercise regularly with [employer], but it was discontinued last year  -Likes to walk  -Would like to go back to exercise but is having trouble going to the gym because he can't afford membership    Left shoulder pain  -Had MRI last week and is waiting to hear about the results  -Never received the diclofenac gel or meloxicam  -Physical therapy is not helping, but he plans to continue with it    Anxiety and stress  -Has not seen counselor     Review of Systems    Previous history  Past Medical History:   Diagnosis Date    Cough, unspecified 03/22/2017    Cough    Myalgia, other site 12/19/2022    Chronic musculoskeletal pain    Other disorders of lung     Lung trouble    Personal history of other diseases of the circulatory system     History of coronary artery disease    Personal history of other diseases of the circulatory system     History of hypertension    Personal history of other diseases of the nervous system and sense organs     History of sleep apnea    Personal history of other specified conditions     History of chest pain    Personal history of other specified conditions     History of heartburn    Unspecified convulsions (Multi) 11/14/2022    Seizure "     Past Surgical History:   Procedure Laterality Date    ANKLE SURGERY  03/24/2014    Ankle Surgery    CT ANGIO CORONARY ART WITH HEARTFLOW IF SCORE >30%  3/14/2020    CT HEART CORONARY ANGIOGRAM 3/14/2020 [facility] [place] EMERGENCY LEGACY    OTHER SURGICAL HISTORY  04/25/2019    Ear pressure equalization tube insertion    RECTAL POLYPECTOMY  06/30/2016    Rectal Surgery Polypectomy     Social History     Tobacco Use    Smoking status: Never    Smokeless tobacco: Never   Substance Use Topics    Alcohol use: Never    Drug use: Never     Family History   Problem Relation Name Age of Onset    Lung cancer Mother      Coronary artery disease Father       Allergies   Allergen Reactions    Aspirin Anaphylaxis    Topiramate Anxiety and Other     delirium    Salicylates Swelling     Tolerates ibuprofen    Penicillins Hives, Itching and Swelling    Lidopatch [Lidocaine-Menthol] Itching and Rash     Current Outpatient Medications   Medication Instructions    acetaminophen (TYLENOL ARTHRITIS PAIN) 650 mg, oral, 3 times daily PRN    acetaminophen (TYLENOL) 1,000 mg, oral, Every 6 hours    albuterol 90 mcg/actuation inhaler 1 puff, inhalation, Every 6 hours PRN    allopurinol (Zyloprim) 100 mg tablet TAKE 1 TABLET BY MOUTH ONCE DAILY    amLODIPine (Norvasc) 10 mg tablet 1 tablet, oral, Daily, As directed    amLODIPine (NORVASC) 5 mg, oral, Daily    atorvastatin (LIPITOR) 40 mg, oral, Nightly    blood sugar diagnostic strip TEST FOUR TIMES A DAY    budesonide-formoteroL (Symbicort) 80-4.5 mcg/actuation inhaler 2 puffs, inhalation, 2 times daily RT, Rinse mouth with water after use to reduce aftertaste and incidence of candidiasis. Do not swallow.    calcium carbonate (Tums Ultra) 400 mg calcium (1,000 mg) chewable tablet 1 tablet, oral, Every 2 hour PRN    ciclopirox (Penlac) 8 % solution Topical, Once Weekly, GENTLY MASSAGE INTO L big toenail daily to top, under and around nail. wipe off with alcohol and trim.    clindamycin (CLEOCIN) 300  mg, oral, 3 times daily    clotrimazole (Lotrimin) 1 % cream 1 Application, Topical, 2 times daily, To affected area    colchicine 0.6 mg, oral, Daily    cyclobenzaprine (FLEXERIL) 5 mg, oral    diclofenac sodium (VOLTAREN) 4 g, Topical, 4 times daily    docusate sodium (COLACE) 100 mg, oral, 2 times daily    Easy Touch Alcohol Prep Pads pads, medicated 3 times daily, Use as directed    escitalopram (LEXAPRO) 10 mg, oral, Daily    FreeStyle Juliette 2 Sensor kit Use as instructed    FreeStyle Lite Strips strip Daily PRN, Use as directed    hydroCHLOROthiazide (HYDRODiuril) 25 mg tablet 1 tablet, oral, Daily    hydroCHLOROthiazide (MICROZIDE) 12.5 mg, oral, Daily    hydrocortisone 2.5 % lotion Topical, 2 times daily, To rash    ibuprofen 600 mg, oral, Every 8 hours PRN    LISINOPRIL ORAL oral    lisinopriL-hydrochlorothiazide 10-12.5 mg tablet 1 tablet, oral, Daily    meclizine (ANTIVERT) 25 mg, oral, 3 times daily PRN    melatonin 3 mg capsule 1 capsule, oral, Nightly PRN    meloxicam (MOBIC) 15 mg, oral, Daily    metFORMIN (GLUCOPHAGE) 500 mg, oral, 2 times daily (morning and late afternoon), Morning and evening meals    OneTouch Delica Plus Lancet 30 gauge misc 3 times daily, As directed    OneTouch Ultra Test strip 2 times daily    OneTouch UltraSoft Lancets misc 2 times daily    Ozempic 0.25 mg, subcutaneous, Once Weekly    pantoprazole (PROTONIX) 40 mg, oral, Daily    polyethylene glycol (GLYCOLAX, MIRALAX) 17 g, oral, Daily, IN 8 OUNCES OF LIQUID AND DRINK; TITRATE TO 1 BOWEL MOVEMENT PER DAY<BR>    psyllium (Konsyl) 6 gram packet oral, Daily, 1-3 tsp - mix with 8oz of water    psyllium (Metamucil, sugar,) powder oral, Daily, 1 tbsp in liquid    traZODone (Desyrel) 100 mg tablet     Trulicity 0.75 mg/0.5 mL pen injector 1 Pen, subcutaneous, Once Weekly    venlafaxine XR (EFFEXOR-XR) 37.5 mg, oral, Daily RT       Objective       Physical Exam  Neurological:      Mental Status: He is alert.      Coordination:  Romberg sign negative. Coordination normal. Finger-Nose-Finger Test normal.      Comments: [place]-Hallpike maneuver negative.            Assessment/Plan   Tip Lynn is a 53 y.o. male who presents for the concerns below:    Problem List Items Addressed This Visit    Balance Issues  -Patient was encouraged to follow-up with neurology to monitor recurrence of seizures  -Encouraged patient to maintain good hydration    Metformin/DM control  -Patient was counseled regarding lifestyle changes with diet and exercise for control of prediabetes  -Patient was referred for consult with nutrition  -Plan to trial discontinuation of metformin and will monitor HbA1c in 3 months to assess impact of lifestyle modifications       Discussed with: Dr. Mcbride  Return in : 3 months    Portions of this note were generated using digital voice recognition software, and may contain grammatical errors       Christ Weston, DO  PGY-2, Family Medicine

## 2024-05-22 ENCOUNTER — APPOINTMENT (OUTPATIENT)
Dept: PHYSICAL THERAPY | Facility: HOSPITAL | Age: 54
End: 2024-05-22
Payer: COMMERCIAL

## 2024-05-23 ENCOUNTER — TELEPHONE (OUTPATIENT)
Dept: RHEUMATOLOGY | Facility: CLINIC | Age: 54
End: 2024-05-23
Payer: COMMERCIAL

## 2024-05-23 DIAGNOSIS — S43.432D TEAR OF LEFT GLENOID LABRUM, SUBSEQUENT ENCOUNTER: Primary | ICD-10-CM

## 2024-05-23 NOTE — TELEPHONE ENCOUNTER
MRI shoulder shows complex labrum tear  Referring patient to orthopedics - patient has been informed    Eugenio Demarco MD

## 2024-05-28 ENCOUNTER — TELEPHONE (OUTPATIENT)
Dept: PRIMARY CARE | Facility: CLINIC | Age: 54
End: 2024-05-28
Payer: COMMERCIAL

## 2024-05-28 DIAGNOSIS — M75.02 ADHESIVE CAPSULITIS OF LEFT SHOULDER: Primary | ICD-10-CM

## 2024-05-28 RX ORDER — MELOXICAM 15 MG/1
15 TABLET ORAL DAILY
Qty: 30 TABLET | Refills: 0 | Status: SHIPPED | OUTPATIENT
Start: 2024-05-28 | End: 2024-06-27

## 2024-05-29 NOTE — PROGRESS NOTES
"Subjective   Patient ID: Tip Lynn is a 53 y.o. male who presents for Follow-up ('my metformin makes me sick, I need to change it').    HPI  \"My balance is off\"  -Feels wobbly when standing  -Has been going on for years  -Comes and goes  -No history of vertigo  -Reports previous diagnosis of epilepsy    Metformin  -Eats with food, but still causes GI upset  -Causes nausea when taken  -Takes with other medications in the \"package\" from [facility] twice a day  -Has been taking 8 or 9 years, GI side effects have been off and on    DM  -Has not been able to get the continuous glucose monitor, but would like to start using it    Lifestyle modification  -Diet: Likes to eat smoothies, duran, salads, fruits, vegetables, ice cream  -Exercise: used to exercise regularly with [employer], but it was discontinued last year  -Likes to walk  -Would like to go back to exercise but is having trouble going to the gym because he can't afford membership    Left shoulder pain  -Had MRI last week and is waiting to hear about the results  -Never received the diclofenac gel or meloxicam  -Physical therapy is not helping, but he plans to continue with it    Anxiety and stress  -Has not seen counselor     Review of Systems    Previous history  Past Medical History:   Diagnosis Date    Cough, unspecified 03/22/2017    Cough    Myalgia, other site 12/19/2022    Chronic musculoskeletal pain    Other disorders of lung     Lung trouble    Personal history of other diseases of the circulatory system     History of coronary artery disease    Personal history of other diseases of the circulatory system     History of hypertension    Personal history of other diseases of the nervous system and sense organs     History of sleep apnea    Personal history of other specified conditions     History of chest pain    Personal history of other specified conditions     History of heartburn    Unspecified convulsions (Multi) 11/14/2022    Seizure "     Past Surgical History:   Procedure Laterality Date    ANKLE SURGERY  03/24/2014    Ankle Surgery    CT ANGIO CORONARY ART WITH HEARTFLOW IF SCORE >30%  3/14/2020    CT HEART CORONARY ANGIOGRAM 3/14/2020 [facility] [place] [facility]GENCY LEGACY    OTHER SURGICAL HISTORY  04/25/2019    Ear pressure equalization tube insertion    RECTAL POLYPECTOMY  06/30/2016    Rectal Surgery Polypectomy     Social History     Tobacco Use    Smoking status: Never    Smokeless tobacco: Never   Substance Use Topics    Alcohol use: Never    Drug use: Never     Family History   Problem Relation Name Age of Onset    Lung cancer Mother      Coronary artery disease Father       Allergies   Allergen Reactions    Aspirin Anaphylaxis    Topiramate Anxiety and Other     delirium    Salicylates Swelling     Tolerates ibuprofen    Penicillins Hives, Itching and Swelling    Lidopatch [Lidocaine-Menthol] Itching and Rash     Current Outpatient Medications   Medication Instructions    acetaminophen (TYLENOL ARTHRITIS PAIN) 650 mg, oral, 3 times daily PRN    acetaminophen (TYLENOL) 1,000 mg, oral, Every 6 hours    albuterol 90 mcg/actuation inhaler 1 puff, inhalation, Every 6 hours PRN    allopurinol (Zyloprim) 100 mg tablet TAKE 1 TABLET BY MOUTH ONCE DAILY    amLODIPine (Norvasc) 10 mg tablet 1 tablet, oral, Daily, As directed    amLODIPine (NORVASC) 5 mg, oral, Daily    atorvastatin (LIPITOR) 40 mg, oral, Nightly    blood sugar diagnostic strip TEST FOUR TIMES A DAY    budesonide-formoteroL (Symbicort) 80-4.5 mcg/actuation inhaler 2 puffs, inhalation, 2 times daily RT, Rinse mouth with water after use to reduce aftertaste and incidence of candidiasis. Do not swallow.    calcium carbonate (Tums Ultra) 400 mg calcium (1,000 mg) chewable tablet 1 tablet, oral, Every 2 hour PRN    ciclopirox (Penlac) 8 % solution Topical, Once Weekly, GENTLY MASSAGE INTO L big toenail daily to top, under and around nail. wipe off with alcohol and trim.    clindamycin (CLEOCIN) 300  mg, oral, 3 times daily    clotrimazole (Lotrimin) 1 % cream 1 Application, Topical, 2 times daily, To affected area    colchicine 0.6 mg, oral, Daily    cyclobenzaprine (FLEXERIL) 5 mg, oral    diclofenac sodium (VOLTAREN) 4 g, Topical, 4 times daily    docusate sodium (COLACE) 100 mg, oral, 2 times daily    Easy Touch Alcohol Prep Pads pads, medicated 3 times daily, Use as directed    escitalopram (LEXAPRO) 10 mg, oral, Daily    FreeStyle Juliette 2 Sensor kit Use as instructed    FreeStyle Lite Strips strip Daily PRN, Use as directed    hydroCHLOROthiazide (HYDRODiuril) 25 mg tablet 1 tablet, oral, Daily    hydroCHLOROthiazide (MICROZIDE) 12.5 mg, oral, Daily    hydrocortisone 2.5 % lotion Topical, 2 times daily, To rash    ibuprofen 600 mg, oral, Every 8 hours PRN    LISINOPRIL ORAL oral    lisinopriL-hydrochlorothiazide 10-12.5 mg tablet 1 tablet, oral, Daily    meclizine (ANTIVERT) 25 mg, oral, 3 times daily PRN    melatonin 3 mg capsule 1 capsule, oral, Nightly PRN    meloxicam (MOBIC) 15 mg, oral, Daily    metFORMIN (GLUCOPHAGE) 500 mg, oral, 2 times daily (morning and late afternoon), Morning and evening meals    OneTouch Delica Plus Lancet 30 gauge misc 3 times daily, As directed    OneTouch Ultra Test strip 2 times daily    OneTouch UltraSoft Lancets misc 2 times daily    Ozempic 0.25 mg, subcutaneous, Once Weekly    pantoprazole (PROTONIX) 40 mg, oral, Daily    polyethylene glycol (GLYCOLAX, MIRALAX) 17 g, oral, Daily, IN 8 OUNCES OF LIQUID AND DRINK; TITRATE TO 1 BOWEL MOVEMENT PER DAY<BR>    psyllium (Konsyl) 6 gram packet oral, Daily, 1-3 tsp - mix with 8oz of water    psyllium (Metamucil, sugar,) powder oral, Daily, 1 tbsp in liquid    traZODone (Desyrel) 100 mg tablet     Trulicity 0.75 mg/0.5 mL pen injector 1 Pen, subcutaneous, Once Weekly    venlafaxine XR (EFFEXOR-XR) 37.5 mg, oral, Daily RT       Objective       Physical Exam  Neurological:      Mental Status: He is alert.      Coordination:  Romberg sign negative. Coordination normal. Finger-Nose-Finger Test normal.      Comments: [place]-Hallpike maneuver negative.            Assessment/Plan   Tip Lynn is a 53 y.o. male who presents for the concerns below:    Problem List Items Addressed This Visit    Balance Issues  -Patient was encouraged to follow-up with neurology to monitor recurrence of seizures  -Encouraged patient to maintain good hydration    Metformin/DM control  -Patient was counseled regarding lifestyle changes with diet and exercise for control of prediabetes  -Patient was referred for consult with nutrition  -Plan to trial discontinuation of metformin and will monitor HbA1c in 3 months to assess impact of lifestyle modifications       Patient seen and examined with the medical student noted above. Assessment and plan have been reviewed and I agree with current management.    Discussed with: Dr. Mcbride  Return in : 3 months    Portions of this note were generated using digital voice recognition software, and may contain grammatical errors       Christ Weston, DO  PGY-2, Family Medicine

## 2024-05-30 NOTE — TELEPHONE ENCOUNTER
Med refill    Reordered meloxicam (Mobic) 15 mg tablet .  Discontinued ibuprofen and naproxen.    Christ Weston, DO  Family Medicine PGY-2

## 2024-06-04 ENCOUNTER — APPOINTMENT (OUTPATIENT)
Dept: CARDIOLOGY | Facility: HOSPITAL | Age: 54
End: 2024-06-04
Payer: COMMERCIAL

## 2024-06-05 ENCOUNTER — TREATMENT (OUTPATIENT)
Dept: PHYSICAL THERAPY | Facility: HOSPITAL | Age: 54
End: 2024-06-05
Payer: COMMERCIAL

## 2024-06-05 ENCOUNTER — OFFICE VISIT (OUTPATIENT)
Dept: ORTHOPEDIC SURGERY | Facility: HOSPITAL | Age: 54
End: 2024-06-05
Payer: COMMERCIAL

## 2024-06-05 ENCOUNTER — TELEPHONE (OUTPATIENT)
Dept: PRIMARY CARE | Facility: CLINIC | Age: 54
End: 2024-06-05

## 2024-06-05 VITALS — BODY MASS INDEX: 31.5 KG/M2 | WEIGHT: 225 LBS | HEIGHT: 71 IN

## 2024-06-05 DIAGNOSIS — M25.512 CHRONIC LEFT SHOULDER PAIN: Primary | ICD-10-CM

## 2024-06-05 DIAGNOSIS — G89.29 CHRONIC LEFT SHOULDER PAIN: Primary | ICD-10-CM

## 2024-06-05 DIAGNOSIS — M25.512 CHRONIC LEFT SHOULDER PAIN: ICD-10-CM

## 2024-06-05 DIAGNOSIS — G89.29 CHRONIC LEFT SHOULDER PAIN: ICD-10-CM

## 2024-06-05 DIAGNOSIS — M25.819 IMPINGEMENT OF SHOULDER: ICD-10-CM

## 2024-06-05 DIAGNOSIS — R26.2 DIFFICULTY WALKING: ICD-10-CM

## 2024-06-05 PROCEDURE — 99213 OFFICE O/P EST LOW 20 MIN: CPT | Performed by: ORTHOPAEDIC SURGERY

## 2024-06-05 PROCEDURE — 97016 VASOPNEUMATIC DEVICE THERAPY: CPT | Mod: GP | Performed by: PHYSICAL THERAPIST

## 2024-06-05 PROCEDURE — 4010F ACE/ARB THERAPY RXD/TAKEN: CPT | Performed by: ORTHOPAEDIC SURGERY

## 2024-06-05 PROCEDURE — 97110 THERAPEUTIC EXERCISES: CPT | Mod: GP | Performed by: PHYSICAL THERAPIST

## 2024-06-05 RX ORDER — MELOXICAM 15 MG/1
15 TABLET ORAL DAILY
Qty: 14 TABLET | Refills: 0 | Status: CANCELLED | OUTPATIENT
Start: 2024-06-05

## 2024-06-05 RX ORDER — DICLOFENAC SODIUM 10 MG/G
4 GEL TOPICAL 4 TIMES DAILY
Qty: 450 G | Refills: 0 | Status: CANCELLED | OUTPATIENT
Start: 2024-06-05

## 2024-06-05 NOTE — TELEPHONE ENCOUNTER
Pt came in saying his BP has been in the 200's systolic. I advised him to go to urgent care or ED. Please call pt [phone] thanks!

## 2024-06-05 NOTE — PROGRESS NOTES
Evaluation of his shoulder he had been seen previously and given injection for his of capsulitis he has been extensive physical therapy which is not helping continues to have pain over the deltoid worse with laying on the left side and also worse with usage of the arm particularly elevation.    The patient is pleasant and cooperative.  The patient is alert and oriented ×3.  Auditory function is intact.  The patient is a good historian.  The patient is not in acute distress.  Eye exam significant for nonicteric sclera, intact ocular muscle movement.  Breathing is rhythmic symmetric and nonlabored.  Left radial pulse palpable brisk capillary refill light touch sensation intact integument intact.  No peripheral edema.  Range of motion is limited elevation 90 external rotation and abduction 40 degrees internal rotation and abduction 45 degrees.  Pain at the extremes of range of motion in each direction.  Motor power well-preserved abduction internal/external rotation grade 4+/5 in each direction.    X-rays and MRI scans were reviewed.  MRI scan reveals cuff tendinopathy and no tear.  Mild thickening of the inferior glenohumeral ligament.    Left shoulder pain    Patient has left shoulder pain from mild adhesive capsulitis.  We discussed options for treatment and I do not recommend surgery.  I do recommend course of oral anti-inflammatory medications.  Patient has an aspirin allergy but can tolerate ibuprofen.  A prescription for meloxicam was provided for 2 weeks and patient will follow-up after that.    This was dictated using voice recognition software and not corrected for grammatical or spelling errors.

## 2024-06-05 NOTE — PROGRESS NOTES
Treatment note  Physical Therapy Treatment/Discharge  Patient Name:Tip Lynn  MRN:[id number]  Today's Date:6/5/2024  Referred by: Nallely Mendosa  Time Calculation  Start Time: 1005  Stop Time: 1109  Time Calculation (min): 64 min    Therapy Diagnosis  1. Impingement of shoulder    2. Chronic left shoulder pain    3. Difficulty walking       Assessment: Patient arrives to therapy with mild pain and states that he has not been feeling well for the past few days due to BP issues. He is instructed to follow up with his primary care or MD about blood pressure today after visit. BP taken today in L arm, seated 130/81. He continues to have pain and range of motion limitations due to a labral tear diagnosed by MRI. He will follow up with ortho about further treatment options. He requires moderate verbal and visual cuing for exercises today. Exercises chosen to improve functional ROM and overall strength for shoulder.     Plan: Pause therapy visits until after ortho follow up to save therapy visits if he decides to have surgery.     Insurance  Visit number: 8  Approved number of visits: 12  Onset Date: 1/15/2024  Certification Period:  Beginning: 3/8/2024        Precautions/Fall Risk:fall risk mod Pacemaker no  Seizures Yes  Post Op Movement/Restrictions No    Subjective/Pain: Patient reports that he is in 3/10 pain today. He states his shoulder pain woke him up in the middle of the night. He states that he was not feeling well in the grocery store yesterday and felt his BP was high and his nose was bleeding. He is going to stop down at his MD before leaving today to follow up with blood pressure since he stated it has been high the past few days.   Current Pain Level (0-10): 3    HEP Compliance: Poor    Objective/Outcome Measures  ROM in supine:  Flexion: 105 deg with pain throughout range  ABD: 80 deg with pain throughout range  ER/IR: 13 deg with pain // 19 deg with pain  /81 taken seated in left arm  Updated  quick dash: 81.8%    Treatment  Therapeutic Exercise 54 minutes  UE ergo 7' 2.0 resistance  Supine Cane CP/Flexion/ Abduction (1.5#) 2x10 each way (1x 10 for abduction no weight)  Sally's flex/abd: 3' ea  Wall ladder + Lift: (flexion/abd) x10     Manual Therapy: 10 minutes (NOT TODAY)  PROM to L shoulder all planes in supine to promote ROM and provide pain relief.     Modalities   Vasopneumatic Device     10 minutes, L shoulder 34 deg min compression for 10 min to decrease post exercise pain and any inflammation/swelling that may be present.     Goals:  Upper Extremity Goals  At time of discharge, the patient will:  1) Perform HEP independently within the clinic to show adherence to initial program for symptom management. -Not MET  2) Patient will report a reduction in pain at its worst from 8/10 to 3/10 to allow independent performance of essential ADLs and iADLs at [facility].- NOT MET  3) Demonstrate proper scapular position and posture for improved positional tolerances to sitting and standing > 1 hr -Not met (continued RSFH posture)  4) Increase ROM of L shoulder by 50% all planes in order to improve the ability to perform essential OH, self-care and ADLs- NOT MET  5) Decrease score of Quick Dash by > 8 points to meet MCID, demonstrating a clinically significant improvement in function- NOT MET  7) Patient will demonstrate Apley reach to L2 of affected shoulder to don/doff clothing without difficulty.- NOT MET  8) Patient will demonstrate functional strength of the affected shoulder to perform lift of 8-12 pounds for groceries and laundry duties. - NOT MET  9) Patient will demonstrate pain free strength of 4+ or >/5 for all affected shoulder groups for return to unrestricted lifting activities. -Not MET    Patient stated goal: reduce pain and regain use of arms, decrease need for assistive device and reduce risk of falls

## 2024-06-06 NOTE — PROGRESS NOTES
"Subjective   Patient ID: Tip Lynn is a 53 y.o. male who presents for Follow-up ('my metformin makes me sick, I need to change it').    HPI  \"My balance is off\"  -Feels wobbly when standing  -Has been going on for years  -Comes and goes  -No history of vertigo  -Reports previous diagnosis of epilepsy    Metformin  -Eats with food, but still causes GI upset  -Causes nausea when taken  -Takes with other medications in the \"package\" from [facility] twice a day  -Has been taking 8 or 9 years, GI side effects have been off and on    DM  -Has not been able to get the continuous glucose monitor, but would like to start using it    Lifestyle modification  -Diet: Likes to eat smoothies, duran, salads, fruits, vegetables, ice cream  -Exercise: used to exercise regularly with [employer], but it was discontinued last year  -Likes to walk  -Would like to go back to exercise but is having trouble going to the gym because he can't afford membership    Left shoulder pain  -Had MRI last week and is waiting to hear about the results  -Never received the diclofenac gel or meloxicam  -Physical therapy is not helping, but he plans to continue with it    Anxiety and stress  -Has not seen counselor     Review of Systems    Previous history  Past Medical History:   Diagnosis Date    Cough, unspecified 03/22/2017    Cough    Myalgia, other site 12/19/2022    Chronic musculoskeletal pain    Other disorders of lung     Lung trouble    Personal history of other diseases of the circulatory system     History of coronary artery disease    Personal history of other diseases of the circulatory system     History of hypertension    Personal history of other diseases of the nervous system and sense organs     History of sleep apnea    Personal history of other specified conditions     History of chest pain    Personal history of other specified conditions     History of heartburn    Unspecified convulsions (Multi) 11/14/2022    Seizure "     Past Surgical History:   Procedure Laterality Date    ANKLE SURGERY  03/24/2014    Ankle Surgery    CT ANGIO CORONARY ART WITH HEARTFLOW IF SCORE >30%  3/14/2020    CT HEART CORONARY ANGIOGRAM 3[place]14[place] [facility] [place] [facility] [place]    OTHER SURGICAL HISTORY  04/25/2019    Ear pressure equalization tube insertion    RECTAL POLYPECTOMY  06/30/2016    Rectal Surgery Polypectomy     Social History     Tobacco Use    Smoking status: Never    Smokeless tobacco: Never   Substance Use Topics    Alcohol use: Never    Drug use: Never     Family History   Problem Relation Name Age of Onset    Lung cancer Mother      Coronary artery disease Father       Allergies   Allergen Reactions    Aspirin Anaphylaxis    Topiramate Anxiety and Other     delirium    Salicylates Swelling     Tolerates ibuprofen    Penicillins Hives, Itching and Swelling    Lidopatch [Lidocaine-Menthol] Itching and Rash     Current Outpatient Medications   Medication Instructions    acetaminophen (TYLENOL ARTHRITIS PAIN) 650 mg, oral, 3 times daily PRN    acetaminophen (TYLENOL) 1,000 mg, oral, Every 6 hours    albuterol 90 mcg/actuation inhaler 1 puff, inhalation, Every 6 hours PRN    allopurinol (Zyloprim) 100 mg tablet TAKE 1 TABLET BY MOUTH ONCE DAILY    amLODIPine (Norvasc) 10 mg tablet 1 tablet, oral, Daily, As directed    amLODIPine (NORVASC) 5 mg, oral, Daily    atorvastatin (LIPITOR) 40 mg, oral, Nightly    blood sugar diagnostic strip TEST FOUR TIMES A DAY    budesonide-formoteroL (Symbicort) 80-4.5 mcg/actuation inhaler 2 puffs, inhalation, 2 times daily RT, Rinse mouth with water after use to reduce aftertaste and incidence of candidiasis. Do not swallow.    calcium carbonate (Tums Ultra) 400 mg calcium (1,000 mg) chewable tablet 1 tablet, oral, Every 2 hour PRN    ciclopirox (Penlac) 8 % solution Topical, Once Weekly, GENTLY MASSAGE INTO L big toenail daily to top, under and around nail. wipe off with alcohol and trim.    clindamycin (CLEOCIN) 300  mg, oral, 3 times daily    clotrimazole (Lotrimin) 1 % cream 1 Application, Topical, 2 times daily, To affected area    colchicine 0.6 mg, oral, Daily    cyclobenzaprine (FLEXERIL) 5 mg, oral    diclofenac sodium (VOLTAREN) 4 g, Topical, 4 times daily    docusate sodium (COLACE) 100 mg, oral, 2 times daily    Easy Touch Alcohol Prep Pads pads, medicated 3 times daily, Use as directed    escitalopram (LEXAPRO) 10 mg, oral, Daily    FreeStyle Juliette 2 Sensor kit Use as instructed    FreeStyle Lite Strips strip Daily PRN, Use as directed    hydroCHLOROthiazide (HYDRODiuril) 25 mg tablet 1 tablet, oral, Daily    hydroCHLOROthiazide (MICROZIDE) 12.5 mg, oral, Daily    hydrocortisone 2.5 % lotion Topical, 2 times daily, To rash    ibuprofen 600 mg, oral, Every 8 hours PRN    LISINOPRIL ORAL oral    lisinopriL-hydrochlorothiazide 10-12.5 mg tablet 1 tablet, oral, Daily    meclizine (ANTIVERT) 25 mg, oral, 3 times daily PRN    melatonin 3 mg capsule 1 capsule, oral, Nightly PRN    meloxicam (MOBIC) 15 mg, oral, Daily    metFORMIN (GLUCOPHAGE) 500 mg, oral, 2 times daily (morning and late afternoon), Morning and evening meals    OneTouch Delica Plus Lancet 30 gauge misc 3 times daily, As directed    OneTouch Ultra Test strip 2 times daily    OneTouch UltraSoft Lancets misc 2 times daily    Ozempic 0.25 mg, subcutaneous, Once Weekly    pantoprazole (PROTONIX) 40 mg, oral, Daily    polyethylene glycol (GLYCOLAX, MIRALAX) 17 g, oral, Daily, IN 8 OUNCES OF LIQUID AND DRINK; TITRATE TO 1 BOWEL MOVEMENT PER DAY<BR>    psyllium (Konsyl) 6 gram packet oral, Daily, 1-3 tsp - mix with 8oz of water    psyllium (Metamucil, sugar,) powder oral, Daily, 1 tbsp in liquid    traZODone (Desyrel) 100 mg tablet     Trulicity 0.75 mg/0.5 mL pen injector 1 Pen, subcutaneous, Once Weekly    venlafaxine XR (EFFEXOR-XR) 37.5 mg, oral, Daily RT       Objective     Physical Exam  Neurological:      Mental Status: He is alert.      Coordination: Romberg  sign negative. Coordination normal. Finger-Nose-Finger Test normal.      Comments: Guerrero-Hallpike maneuver negative.        Assessment/Plan   Tip Lynn is a 53 y.o. male who presents for the concerns below:    Problem List Items Addressed This Visit    Balance Issues  -Patient was encouraged to follow-up with neurology to monitor recurrence of seizures  -Encouraged patient to maintain good hydration    Metformin/DM control  -Patient was counseled regarding lifestyle changes with diet and exercise for control of prediabetes  -Patient was referred for consult with nutrition  -Plan to trial discontinuation of metformin and will monitor HbA1c in 3 months to assess impact of lifestyle modifications       Discussed with: Dr. Mcbride  Return in : 3 months    Portions of this note were generated using digital voice recognition software, and may contain grammatical errors       Christ Weston DO  PGY-2, Family Medicine      I reviewed the resident/fellow's documentation and discussed the patient with the resident/fellow. I agree with the resident/fellow's medical decision making as documented in the note.   Pt was seen by student Radha Cash and resident Christ Weston DO.

## 2024-06-07 ENCOUNTER — TELEPHONE (OUTPATIENT)
Dept: ORTHOPEDIC SURGERY | Facility: HOSPITAL | Age: 54
End: 2024-06-07
Payer: COMMERCIAL

## 2024-06-07 NOTE — TELEPHONE ENCOUNTER
Please send meloxicam to pharmacy in chart, patient called and said it was not sent in at appt yesterday, patient said it was to be for 2 weeks.

## 2024-06-10 ENCOUNTER — APPOINTMENT (OUTPATIENT)
Dept: RADIOLOGY | Facility: HOSPITAL | Age: 54
End: 2024-06-10
Payer: COMMERCIAL

## 2024-06-10 ENCOUNTER — HOSPITAL ENCOUNTER (EMERGENCY)
Facility: HOSPITAL | Age: 54
Discharge: HOME | End: 2024-06-11
Attending: EMERGENCY MEDICINE
Payer: COMMERCIAL

## 2024-06-10 DIAGNOSIS — M25.562 PAIN IN BOTH KNEES, UNSPECIFIED CHRONICITY: Primary | ICD-10-CM

## 2024-06-10 DIAGNOSIS — N43.3 HYDROCELE, UNSPECIFIED HYDROCELE TYPE: Primary | ICD-10-CM

## 2024-06-10 DIAGNOSIS — R10.31 RIGHT GROIN PAIN: ICD-10-CM

## 2024-06-10 DIAGNOSIS — M25.561 PAIN IN BOTH KNEES, UNSPECIFIED CHRONICITY: Primary | ICD-10-CM

## 2024-06-10 LAB
APPEARANCE UR: CLEAR
BILIRUB UR STRIP.AUTO-MCNC: NEGATIVE MG/DL
COLOR UR: YELLOW
GLUCOSE UR STRIP.AUTO-MCNC: NORMAL MG/DL
KETONES UR STRIP.AUTO-MCNC: ABNORMAL MG/DL
LEUKOCYTE ESTERASE UR QL STRIP.AUTO: NEGATIVE
MUCOUS THREADS #/AREA URNS AUTO: NORMAL /LPF
NITRITE UR QL STRIP.AUTO: NEGATIVE
PH UR STRIP.AUTO: 6 [PH]
PROT UR STRIP.AUTO-MCNC: ABNORMAL MG/DL
RBC # UR STRIP.AUTO: NEGATIVE /UL
RBC #/AREA URNS AUTO: NORMAL /HPF
SP GR UR STRIP.AUTO: 1.03
UROBILINOGEN UR STRIP.AUTO-MCNC: ABNORMAL MG/DL
WBC #/AREA URNS AUTO: NORMAL /HPF

## 2024-06-10 PROCEDURE — 76870 US EXAM SCROTUM: CPT | Performed by: RADIOLOGY

## 2024-06-10 PROCEDURE — 81001 URINALYSIS AUTO W/SCOPE: CPT

## 2024-06-10 PROCEDURE — 76870 US EXAM SCROTUM: CPT

## 2024-06-10 PROCEDURE — 99285 EMERGENCY DEPT VISIT HI MDM: CPT | Mod: 25

## 2024-06-10 RX ORDER — MELOXICAM 15 MG/1
15 TABLET ORAL DAILY
Qty: 15 TABLET | Refills: 0 | Status: SHIPPED | OUTPATIENT
Start: 2024-06-10 | End: 2024-06-25

## 2024-06-10 ASSESSMENT — COLUMBIA-SUICIDE SEVERITY RATING SCALE - C-SSRS
2. HAVE YOU ACTUALLY HAD ANY THOUGHTS OF KILLING YOURSELF?: NO
6. HAVE YOU EVER DONE ANYTHING, STARTED TO DO ANYTHING, OR PREPARED TO DO ANYTHING TO END YOUR LIFE?: NO
1. IN THE PAST MONTH, HAVE YOU WISHED YOU WERE DEAD OR WISHED YOU COULD GO TO SLEEP AND NOT WAKE UP?: NO

## 2024-06-10 ASSESSMENT — PAIN - FUNCTIONAL ASSESSMENT: PAIN_FUNCTIONAL_ASSESSMENT: 0-10

## 2024-06-10 ASSESSMENT — PAIN SCALES - GENERAL: PAINLEVEL_OUTOF10: 6

## 2024-06-10 ASSESSMENT — PAIN DESCRIPTION - PAIN TYPE: TYPE: ACUTE PAIN

## 2024-06-11 ENCOUNTER — APPOINTMENT (OUTPATIENT)
Dept: PHYSICAL THERAPY | Facility: HOSPITAL | Age: 54
End: 2024-06-11
Payer: COMMERCIAL

## 2024-06-11 VITALS
HEART RATE: 90 BPM | DIASTOLIC BLOOD PRESSURE: 99 MMHG | RESPIRATION RATE: 18 BRPM | TEMPERATURE: 98.4 F | OXYGEN SATURATION: 95 % | SYSTOLIC BLOOD PRESSURE: 145 MMHG

## 2024-06-11 LAB — HOLD SPECIMEN: NORMAL

## 2024-06-11 PROCEDURE — 96372 THER/PROPH/DIAG INJ SC/IM: CPT

## 2024-06-11 PROCEDURE — 2500000004 HC RX 250 GENERAL PHARMACY W/ HCPCS (ALT 636 FOR OP/ED)

## 2024-06-11 RX ORDER — NAPROXEN 500 MG/1
500 TABLET ORAL
Qty: 30 TABLET | Refills: 0 | Status: SHIPPED | OUTPATIENT
Start: 2024-06-11 | End: 2024-06-26

## 2024-06-11 RX ORDER — ACETAMINOPHEN 325 MG/1
975 TABLET ORAL ONCE
Status: COMPLETED | OUTPATIENT
Start: 2024-06-11 | End: 2024-06-11

## 2024-06-11 RX ORDER — ACETAMINOPHEN 500 MG
1000 TABLET ORAL EVERY 6 HOURS PRN
Qty: 30 TABLET | Refills: 0 | Status: SHIPPED | OUTPATIENT
Start: 2024-06-11 | End: 2024-06-21

## 2024-06-11 RX ORDER — KETOROLAC TROMETHAMINE 30 MG/ML
30 INJECTION, SOLUTION INTRAMUSCULAR; INTRAVENOUS ONCE
Status: COMPLETED | OUTPATIENT
Start: 2024-06-11 | End: 2024-06-11

## 2024-06-11 ASSESSMENT — LIFESTYLE VARIABLES
HAVE PEOPLE ANNOYED YOU BY CRITICIZING YOUR DRINKING: NO
EVER HAD A DRINK FIRST THING IN THE MORNING TO STEADY YOUR NERVES TO GET RID OF A HANGOVER: NO
EVER FELT BAD OR GUILTY ABOUT YOUR DRINKING: NO
TOTAL SCORE: 0
HAVE YOU EVER FELT YOU SHOULD CUT DOWN ON YOUR DRINKING: NO

## 2024-06-11 NOTE — ED TRIAGE NOTES
Patient endorsing intermittent right side testicle pain. Denies traumatic injury, redness, or swelling.

## 2024-06-11 NOTE — DISCHARGE INSTRUCTIONS
Please return to the emergency department, should you have any worsening symptoms, are unable to get an appointment with your primary care physician within the discussed time frame, or have any further concerns.     Please follow up with: Primary care as needed.  Please follow-up with urology for further management.    You may take ibuprofen or tylenol for pain. You may alternate ibuprofen and tylenol every 6 hours for better pain control.    Do not exceed 2400mg of ibuprofen or 4000mg of tylenol in a 24 hour period.

## 2024-06-17 ENCOUNTER — APPOINTMENT (OUTPATIENT)
Dept: RHEUMATOLOGY | Facility: CLINIC | Age: 54
End: 2024-06-17
Payer: COMMERCIAL

## 2024-06-19 ENCOUNTER — TELEPHONE (OUTPATIENT)
Dept: OPHTHALMOLOGY | Facility: CLINIC | Age: 54
End: 2024-06-19
Payer: COMMERCIAL

## 2024-06-27 ENCOUNTER — APPOINTMENT (OUTPATIENT)
Dept: PRIMARY CARE | Facility: CLINIC | Age: 54
End: 2024-06-27
Payer: COMMERCIAL

## 2024-07-08 DIAGNOSIS — M25.561 PAIN IN BOTH KNEES, UNSPECIFIED CHRONICITY: ICD-10-CM

## 2024-07-08 DIAGNOSIS — M25.562 PAIN IN BOTH KNEES, UNSPECIFIED CHRONICITY: ICD-10-CM

## 2024-07-10 DIAGNOSIS — M1A.00X0 IDIOPATHIC CHRONIC GOUT WITHOUT TOPHUS, UNSPECIFIED SITE: Primary | ICD-10-CM

## 2024-07-10 RX ORDER — MELOXICAM 15 MG/1
TABLET ORAL
Qty: 15 TABLET | Refills: 10 | Status: SHIPPED | OUTPATIENT
Start: 2024-07-10

## 2024-07-12 RX ORDER — ALLOPURINOL 100 MG/1
TABLET ORAL
Qty: 30 TABLET | Refills: 10 | Status: SHIPPED | OUTPATIENT
Start: 2024-07-12

## 2024-07-22 ENCOUNTER — APPOINTMENT (OUTPATIENT)
Dept: RHEUMATOLOGY | Facility: CLINIC | Age: 54
End: 2024-07-22
Payer: MEDICARE

## 2024-07-23 ENCOUNTER — APPOINTMENT (OUTPATIENT)
Dept: PRIMARY CARE | Facility: CLINIC | Age: 54
End: 2024-07-23
Payer: COMMERCIAL

## 2024-07-23 VITALS
HEIGHT: 71 IN | OXYGEN SATURATION: 97 % | BODY MASS INDEX: 32.62 KG/M2 | WEIGHT: 233 LBS | SYSTOLIC BLOOD PRESSURE: 130 MMHG | HEART RATE: 81 BPM | TEMPERATURE: 96.6 F | DIASTOLIC BLOOD PRESSURE: 84 MMHG

## 2024-07-23 DIAGNOSIS — R73.03 PREDIABETES: ICD-10-CM

## 2024-07-23 DIAGNOSIS — I10 HYPERTENSION, UNSPECIFIED TYPE: ICD-10-CM

## 2024-07-23 DIAGNOSIS — F41.8 ANXIETY WITH SOMATIC FEATURES: ICD-10-CM

## 2024-07-23 DIAGNOSIS — E11.9 TYPE 2 DIABETES MELLITUS WITHOUT COMPLICATION, WITHOUT LONG-TERM CURRENT USE OF INSULIN (MULTI): ICD-10-CM

## 2024-07-23 DIAGNOSIS — M75.02 ADHESIVE CAPSULITIS OF LEFT SHOULDER: Primary | ICD-10-CM

## 2024-07-23 PROCEDURE — 99214 OFFICE O/P EST MOD 30 MIN: CPT

## 2024-07-23 PROCEDURE — 3008F BODY MASS INDEX DOCD: CPT

## 2024-07-23 PROCEDURE — 3075F SYST BP GE 130 - 139MM HG: CPT

## 2024-07-23 PROCEDURE — 3079F DIAST BP 80-89 MM HG: CPT

## 2024-07-23 ASSESSMENT — ENCOUNTER SYMPTOMS: DEPRESSION: 1

## 2024-07-23 ASSESSMENT — PAIN SCALES - GENERAL: PAINLEVEL: 7

## 2024-07-23 ASSESSMENT — PATIENT HEALTH QUESTIONNAIRE - PHQ9
2. FEELING DOWN, DEPRESSED OR HOPELESS: NOT AT ALL
SUM OF ALL RESPONSES TO PHQ9 QUESTIONS 1 AND 2: 0
1. LITTLE INTEREST OR PLEASURE IN DOING THINGS: NOT AT ALL

## 2024-07-23 NOTE — PROGRESS NOTES
"Subjective   Patient ID: Tip Lynn is a 54 y.o. male who presents for Follow-up (' I'm in pain right now,my body hurts' per pt).    HPI  Hx gout, HTN, HLD, asthma, CARLOS on CPAP, T2DM, seizure, and anxiety     #Paper work  - want to know what what disability in order to start job through \"Vocational services\"  - has hx of adhesive capsulitis  -  trailed NSAIDs, PT, injections    #DM  - A1c 6.0%  4/24/23  - reports GI upset with metformin    #hx anxiety  - has referral to behavioral health   - takes trazdone prn    Review of Systems    Previous history  Past Medical History:   Diagnosis Date    Cough, unspecified 03/22/2017    Cough    Myalgia, other site 12/19/2022    Chronic musculoskeletal pain    Other disorders of lung     Lung trouble    Personal history of other diseases of the circulatory system     History of coronary artery disease    Personal history of other diseases of the circulatory system     History of hypertension    Personal history of other diseases of the nervous system and sense organs     History of sleep apnea    Personal history of other specified conditions     History of chest pain    Personal history of other specified conditions     History of heartburn    Unspecified convulsions (Multi) 11/14/2022    Seizure     Past Surgical History:   Procedure Laterality Date    ANKLE SURGERY  03/24/2014    Ankle Surgery    CT ANGIO CORONARY ART WITH HEARTFLOW IF SCORE >30%  3/14/2020    CT HEART CORONARY ANGIOGRAM 3/14/2020 [facility] [place]    OTHER SURGICAL HISTORY  04/25/2019    Ear pressure equalization tube insertion    RECTAL POLYPECTOMY  06/30/2016    Rectal Surgery Polypectomy     Social History     Tobacco Use    Smoking status: Never    Smokeless tobacco: Never   Substance Use Topics    Alcohol use: Never    Drug use: Never     Family History   Problem Relation Name Age of Onset    Lung cancer Mother      Coronary artery disease Father       Allergies   Allergen Reactions    Aspirin " Anaphylaxis    Topiramate Anxiety and Other     delirium    Salicylates Swelling     Tolerates ibuprofen    Penicillins Hives, Itching and Swelling    Lidopatch [Lidocaine-Menthol] Itching and Rash     Current Outpatient Medications   Medication Instructions    albuterol 90 mcg/actuation inhaler 1 puff, inhalation, Every 6 hours PRN    allopurinol (Zyloprim) 100 mg tablet TAKE 1 TABLET BY MOUTH ONCE DAILY *TOTAL DAILY DOSE IS 400MG IN COMBINATION WITH 300MG*    amLODIPine (Norvasc) 10 mg tablet 1 tablet, oral, Daily, As directed    budesonide-formoteroL (Symbicort) 80-4.5 mcg/actuation inhaler 2 puffs, inhalation, 2 times daily RT, Rinse mouth with water after use to reduce aftertaste and incidence of candidiasis. Do not swallow.    ciclopirox (Penlac) 8 % solution Topical, Once Weekly, GENTLY MASSAGE INTO L big toenail daily to top, under and around nail. wipe off with alcohol and trim.    cyclobenzaprine (FLEXERIL) 5 mg, oral    Easy Touch Alcohol Prep Pads pads, medicated 3 times daily, Use as directed    hydroCHLOROthiazide (HYDRODiuril) 25 mg tablet 1 tablet, oral, Daily    melatonin 3 mg capsule 1 capsule, oral, Nightly PRN    meloxicam (Mobic) 15 mg tablet TAKE 1 TABLET BY MOUTH ONCE DAILY FOR 15 DAYS    pantoprazole (PROTONIX) 40 mg, oral, Daily    traZODone (Desyrel) 100 mg tablet        Objective       Physical Exam  HENT:      Head: Normocephalic and atraumatic.   Eyes:      General: No scleral icterus.     Conjunctiva/sclera: Conjunctivae normal.   Cardiovascular:      Rate and Rhythm: Normal rate and regular rhythm.      Heart sounds: No murmur heard.     No friction rub. No gallop.   Pulmonary:      Effort: Pulmonary effort is normal. No respiratory distress.      Breath sounds: Normal breath sounds.   Abdominal:      General: There is no distension.      Palpations: Abdomen is soft.      Tenderness: There is no abdominal tenderness.   Musculoskeletal:         General: Normal range of motion.   Skin:      General: Skin is warm and dry.   Neurological:      General: No focal deficit present.      Mental Status: He is alert and oriented to person, place, and time.   Psychiatric:         Mood and Affect: Mood normal.           Assessment/Plan   Tip Lynn is a 54 y.o. male who presents for the concerns below:    Problem List Items Addressed This Visit       Anxiety with somatic features    Relevant Orders    Referral to [facility]    Adhesive capsulitis of left shoulder - Primary    Relevant Orders    Referral to Occupational Therapy     Other Visit Diagnoses       Type 2 diabetes mellitus without complication, without long-term current use of insulin (Multi)        Prediabetes        Relevant Orders    Hemoglobin A1c    Renal function panel    Albumin-Creatinine Ratio, Urine Random    Hypertension, unspecified type        Relevant Orders    Renal function panel    Albumin-Creatinine Ratio, Urine Random          #Paper work  --- OT evaluation to complete paperwork stating what pt disabilities are    #DM  - A1c 6.0%  4/24/23  --- plan to hold metformin for now. Repeat A1c    #hx anxiety  - referral to behavioral health  - c/w trazadone  100mg nightly    Discussed with:  Dr. Abdi  Return in : 2 months    Portions of this note were generated using digital voice recognition software, and may contain grammatical errors       Christ Weston, DO  PGY-2, Family Medicine

## 2024-07-31 ENCOUNTER — APPOINTMENT (OUTPATIENT)
Dept: OTOLARYNGOLOGY | Facility: CLINIC | Age: 54
End: 2024-07-31
Payer: COMMERCIAL

## 2024-08-09 ENCOUNTER — TELEMEDICINE (OUTPATIENT)
Dept: RHEUMATOLOGY | Facility: CLINIC | Age: 54
End: 2024-08-09
Payer: MEDICARE

## 2024-08-09 DIAGNOSIS — Z91.199 NO-SHOW FOR APPOINTMENT: Primary | ICD-10-CM

## 2024-08-11 PROBLEM — Z91.199 NO-SHOW FOR APPOINTMENT: Status: ACTIVE | Noted: 2024-08-11

## 2024-08-15 ENCOUNTER — HOSPITAL ENCOUNTER (OUTPATIENT)
Dept: CARDIOLOGY | Facility: HOSPITAL | Age: 54
Discharge: HOME | End: 2024-08-15
Payer: MEDICARE

## 2024-08-15 DIAGNOSIS — R06.09 DYSPNEA ON EXERTION: ICD-10-CM

## 2024-08-15 PROBLEM — Z29.81 ENCOUNTER FOR HIV PRE-EXPOSURE PROPHYLAXIS: Status: ACTIVE | Noted: 2024-07-31

## 2024-08-15 PROBLEM — N18.9 CHRONIC KIDNEY DISEASE: Status: ACTIVE | Noted: 2024-07-31

## 2024-08-15 LAB
AORTIC VALVE PEAK VELOCITY: 1.39 M/S
AV PEAK GRADIENT: 7.7 MMHG
AVA (PEAK VEL): 2.89 CM2
EJECTION FRACTION APICAL 4 CHAMBER: 60.3
EJECTION FRACTION: 60 %
LEFT ATRIUM VOLUME AREA LENGTH INDEX BSA: 22.5 ML/M2
LEFT VENTRICULAR OUTFLOW TRACT DIAMETER: 2.06 CM
MITRAL VALVE E/A RATIO: 0.95
RIGHT VENTRICLE FREE WALL PEAK S': 8 CM/S
TRICUSPID ANNULAR PLANE SYSTOLIC EXCURSION: 2 CM

## 2024-08-15 PROCEDURE — 93306 TTE W/DOPPLER COMPLETE: CPT | Performed by: INTERNAL MEDICINE

## 2024-08-15 PROCEDURE — 93306 TTE W/DOPPLER COMPLETE: CPT

## 2024-08-15 PROCEDURE — 2500000004 HC RX 250 GENERAL PHARMACY W/ HCPCS (ALT 636 FOR OP/ED): Performed by: INTERNAL MEDICINE

## 2024-08-16 ENCOUNTER — APPOINTMENT (OUTPATIENT)
Dept: PRIMARY CARE | Facility: CLINIC | Age: 54
End: 2024-08-16
Payer: MEDICARE

## 2024-08-16 ENCOUNTER — TELEMEDICINE (OUTPATIENT)
Dept: ENDOCRINOLOGY | Facility: CLINIC | Age: 54
End: 2024-08-16
Payer: MEDICARE

## 2024-08-16 VITALS — WEIGHT: 233 LBS | BODY MASS INDEX: 32.62 KG/M2 | HEIGHT: 71 IN

## 2024-08-16 DIAGNOSIS — E11.9 TYPE 2 DIABETES MELLITUS WITHOUT COMPLICATION, WITHOUT LONG-TERM CURRENT USE OF INSULIN (MULTI): Primary | ICD-10-CM

## 2024-08-16 DIAGNOSIS — Z71.3 DIETARY COUNSELING: ICD-10-CM

## 2024-08-16 DIAGNOSIS — Z76.89 ENCOUNTER FOR WEIGHT MANAGEMENT: ICD-10-CM

## 2024-08-16 PROCEDURE — 3008F BODY MASS INDEX DOCD: CPT | Performed by: INTERNAL MEDICINE

## 2024-08-16 PROCEDURE — 99214 OFFICE O/P EST MOD 30 MIN: CPT | Performed by: INTERNAL MEDICINE

## 2024-08-16 RX ORDER — DULAGLUTIDE 0.75 MG/.5ML
0.75 INJECTION, SOLUTION SUBCUTANEOUS
COMMUNITY

## 2024-08-16 RX ORDER — ATORVASTATIN CALCIUM 40 MG/1
1 TABLET, FILM COATED ORAL
COMMUNITY
Start: 2024-08-05

## 2024-08-16 NOTE — PROGRESS NOTES
History of Present Illness  Mr. ALVAREZ is a 54 year year old male with a history of gout, HTN, HLD, asthma, CARLOS, T2DM, seizure, who presents for weight management.     Virtual visit:  I see him for weight management.     Nutrition: Has not changed anything since last meeting   Admits to eating more carbs and red meet.    Sleep: CARLOS- uses Cpap      Exercise: not exercising-  plans to start going to the gym   AOM: DM2: was on ozempic and metformin, now on Trulicity 0.75mg once a week   Metformin -- made her sick and he stopped it.  was supposed to see Audra or Anabela, but has not.    Stress: managed       Current Outpatient Medications:     albuterol 90 mcg/actuation inhaler, Inhale 1 puff every 6 hours if needed for wheezing or shortness of breath., Disp: 18 g, Rfl: 3    allopurinol (Zyloprim) 100 mg tablet, TAKE 1 TABLET BY MOUTH ONCE DAILY *TOTAL DAILY DOSE IS 400MG IN COMBINATION WITH 300MG*, Disp: 30 tablet, Rfl: 10    amLODIPine (Norvasc) 10 mg tablet, Take 1 tablet (10 mg) by mouth once daily. As directed, Disp: , Rfl:     budesonide-formoteroL (Symbicort) 80-4.5 mcg/actuation inhaler, Inhale 2 puffs 2 times a day. Rinse mouth with water after use to reduce aftertaste and incidence of candidiasis. Do not swallow., Disp: 10.2 g, Rfl: 2    ciclopirox (Penlac) 8 % solution, Apply topically 1 (one) time per week. GENTLY MASSAGE INTO L big toenail daily to top, under and around nail. wipe off with alcohol and trim., Disp: , Rfl:     cyclobenzaprine (Flexeril) 5 mg tablet, Take 1 tablet (5 mg) by mouth., Disp: , Rfl:     Easy Touch Alcohol Prep Pads pads, medicated, 3 times a day. Use as directed, Disp: , Rfl:     hydroCHLOROthiazide (HYDRODiuril) 25 mg tablet, Take 1 tablet (25 mg) by mouth once daily., Disp: , Rfl:     melatonin 3 mg capsule, Take 1 capsule by mouth as needed at bedtime., Disp: , Rfl:     meloxicam (Mobic) 15 mg tablet, TAKE 1 TABLET BY MOUTH ONCE DAILY FOR 15 DAYS, Disp: 15 tablet, Rfl: 10     pantoprazole (ProtoNix) 40 mg EC tablet, Take 1 tablet (40 mg) by mouth once daily., Disp: 30 tablet, Rfl: 1    traZODone (Desyrel) 100 mg tablet, , Disp: , Rfl:   No current facility-administered medications for this visit.    ROS:  System: normal  Eyes : no visual changes  Neck : no tenderness, no new lumps/bumps  Respiratory : no SOB  Cardiovascular : no chest pain, no palpitations  Gastro-Intestinal : no abdominal concerns  Neurological : no numbness or tingling in the extremities  Musculoskeletal : no joint paint, no muscle pain  Skin : no unusual rashes  Psychiatric : no depression, no anxiety  See HPI for Endocrine ROS    Past Medical History:   Diagnosis Date    Cough, unspecified 03/22/2017    Cough    Myalgia, other site 12/19/2022    Chronic musculoskeletal pain    Other disorders of lung     Lung trouble    Personal history of other diseases of the circulatory system     History of coronary artery disease    Personal history of other diseases of the circulatory system     History of hypertension    Personal history of other diseases of the nervous system and sense organs     History of sleep apnea    Personal history of other specified conditions     History of chest pain    Personal history of other specified conditions     History of heartburn    Unspecified convulsions (Multi) 11/14/2022    Seizure       Past Surgical History:   Procedure Laterality Date    ANKLE SURGERY  03/24/2014    Ankle Surgery    CT ANGIO CORONARY ART WITH HEARTFLOW IF SCORE >30%  3/14/2020    [place] HEART [place]ONARY [place] [facility] [place]    OTHER SURGICAL HISTORY  04/25/2019    Ear pressure equalization tube insertion    RECTAL POLYPECTOMY  06/30/2016    Rectal Surgery Polypectomy       Social History     Socioeconomic History    Marital status: Single     Spouse name: Not on file    Number of children: Not on file    Years of education: Not on file    Highest education level: Not on file   Occupational History     Not on file   Tobacco Use    Smoking status: Never    Smokeless tobacco: Never   Substance and Sexual Activity    Alcohol use: Never    Drug use: Never    Sexual activity: Not on file   Other Topics Concern    Not on file   Social History Narrative    Not on file     Social Determinants of Health     Financial Resource Strain: Not on File (12/10/2021)    Received from [facility]    Financial Resource Strain     Financial Resource Strain: 0   Food Insecurity: Not on File (12/10/2021)    Received from [facility]    Food Insecurity     Food: 0   Transportation Needs: Not on File (12/10/2021)    Received from [facility]    Transportation Needs     Transportation: 0   Physical Activity: Not on File (12/10/2021)    Received from [facility]    Physical Activity     Physical Activity: 0   Stress: Not on File (12/10/2021)    Received from [facility]    Stress     Stress: 0   Social Connections: Not on File (12/10/2021)    Received from [facility]    Social Connections     Social Connections and Isolation: 0   Intimate Partner Violence: Not on file   Housing Stability: Not on File (12/10/2021)    Received from [facility]    Housing Stability     Housin       Objective    Physical Exam:  There were no vitals taken for this visit.  General : alert and oriented X3, no acute distress  Eyes : EOMI     Provider Impressions        Mr. ALVAREZ is a 54 year year old male with a history of gout, HTN, HLD, asthma, CARLOS, T2DM, seizure, who presents for weight management.     T2DM : since 2020  has had gradual weight gain since then.  weight has been stable the past year.  hasn't ever tried in the past to lose weight.  He does all the shopping and cooking.  at home has two kids : 17 and 11y/o.     1. Weight Management : Reviewed the principles of energy metabolism, caloric intake and expenditure, and rationale for a treatment program. Also reinforced need for reduced calorie, low fat diet and increased physical  activity.     We reviewed the possibility of starting an interdisciplinary lifestyle intervention program involving improvement of the diet, a personalized exercise program, efforts to reduce the stress and the possibility of using appetite suppressant medications in an effort to help with the weight loss process. The patient expressed interest in the plan.     2. Nutrition : I discussed trying one of the diet approaches we have here in the program : Mediterranean lifestyle, ketosis diet.  Will have him see Karma Echols.    5/10/23 : 248lb, 34.66kg/m2  7/18/23: 222.6lbs, 31.02kg/m2  9/12/23: 204lb, 28.45kg/m2  8/16/24: 233lb, 32.50kg/m2     3. Sleep : CARLOS, on CPAP     4. Stress : lives with son 17 yrs old and 12 year old niece (has taken care of niece since birth), works part time as . is single.     5. Exercise : minimal     6. Appetite : stress and boredom eating      7. DM2 : on metformin  discussed adding a GLP-1 = he denies h/o pancreatitis or personal/family history of MTC.  Was on ozempic, now off metformin and on trulicity 0.75mg/wk --> will up to 1.5mg/wk.  For ongoing DM care- have him see Sidney MONZON in a group visit.  Jens Monteiro MD

## 2024-08-26 ENCOUNTER — APPOINTMENT (OUTPATIENT)
Dept: ENDOCRINOLOGY | Facility: CLINIC | Age: 54
End: 2024-08-26
Payer: COMMERCIAL

## 2024-09-09 ENCOUNTER — OFFICE VISIT (OUTPATIENT)
Dept: RHEUMATOLOGY | Facility: CLINIC | Age: 54
End: 2024-09-09
Payer: MEDICARE

## 2024-09-09 ENCOUNTER — APPOINTMENT (OUTPATIENT)
Dept: RHEUMATOLOGY | Facility: CLINIC | Age: 54
End: 2024-09-09
Payer: MEDICARE

## 2024-09-09 VITALS
HEART RATE: 89 BPM | SYSTOLIC BLOOD PRESSURE: 129 MMHG | BODY MASS INDEX: 32.48 KG/M2 | WEIGHT: 232 LBS | DIASTOLIC BLOOD PRESSURE: 85 MMHG | TEMPERATURE: 97.9 F | HEIGHT: 71 IN

## 2024-09-09 DIAGNOSIS — M75.102 LEFT ROTATOR CUFF TEAR ARTHROPATHY: Primary | ICD-10-CM

## 2024-09-09 DIAGNOSIS — M10.9 GOUT, UNSPECIFIED CAUSE, UNSPECIFIED CHRONICITY, UNSPECIFIED SITE: ICD-10-CM

## 2024-09-09 DIAGNOSIS — M12.812 LEFT ROTATOR CUFF TEAR ARTHROPATHY: Primary | ICD-10-CM

## 2024-09-09 PROCEDURE — 3008F BODY MASS INDEX DOCD: CPT | Performed by: INTERNAL MEDICINE

## 2024-09-09 PROCEDURE — 99214 OFFICE O/P EST MOD 30 MIN: CPT | Performed by: INTERNAL MEDICINE

## 2024-09-09 PROCEDURE — 3074F SYST BP LT 130 MM HG: CPT | Performed by: INTERNAL MEDICINE

## 2024-09-09 PROCEDURE — 20611 DRAIN/INJ JOINT/BURSA W/US: CPT | Performed by: INTERNAL MEDICINE

## 2024-09-09 PROCEDURE — 3079F DIAST BP 80-89 MM HG: CPT | Performed by: INTERNAL MEDICINE

## 2024-09-09 ASSESSMENT — PAIN SCALES - GENERAL: PAINLEVEL: 10-WORST PAIN EVER

## 2024-09-09 NOTE — PROGRESS NOTES
"Recall    52 year old with metabolic syndrome, Gout, left Rotator cuff impingement and Left Hip TAYLOR      Current meds  Allopurinol 400 mg daily from  on 9/14/2023  Last UA 5.1 mg/dl  Does not have inflammatory back pain. No diagnosis of IBD, Uveitis, Psoriasis. No family history of autoimmune issues     Previously has HTN and CARLOS with Pre DM  Prescribed CPAP but thinks it is not working  has daytime somnolence and fatigue         Chief Complaint: Follow up of gout and left shoulder pain.     HPI:Previous patient of Dr. Demarco. No more gout attacks. Has recurrenc eof left shoulder pain. Requesting an USG injection.        Review of Systems  Constitutional: Denies fever, chills  Eyes: Denies dry eyes, redness of the eyes, pain in the eyes   ENT: Denies dry mouth, sores in the mouth, poor dentition   Cardiovascular: Denies chest pain, lower extremity edema  Respiratory: Denies shortness of breath, cough, hemoptysis  Gastrointestinal: Denies abdominal pain, N/V/D, dysphagia, odynophagia, heartburn   Integumentary: Denies rash, photosensitivity, nail changes, alopecia  Neurological: Denies any numbness, tingling, focal weakness   Hematologic/Lymphatic: Denies bleeding, easy bruising, Raynaud's phenomena   MSK: As per HPI       Objective  Vitals:    09/09/24 1018   BP: 129/85   Pulse: 89   Temp: 36.6 °C (97.9 °F)   Weight: 105 kg (232 lb)   Height: 1.803 m (5' 11\")       Physical Exam  Blood pressure 129/85, pulse 89, temperature 36.6 °C (97.9 °F), height 1.803 m (5' 11\"), weight 105 kg (232 lb).  General: Cooperative, in NAD   Eyes: Conjunctiva clear, sclera white, anicteric   Nose: No external deformity, no mucosal crusting or signs of bleeding   Throat/Mouth: Moist mucous membranes, normal dentition, no ulcers or lesions   Lungs: No respiratory distress; lungs CTAB; no wheezes, rales, rhonchi, or stridor   Heart: Normal S1 and S2; regular rate and rhythm; no murmurs, rubs, or gallops  Skin: No rashes, ulcers, " tophi, or nodules noted  MSK:   Spine: Normal posture, no point tenderness to palpation, normal ROM  Upper Extremities:   Hands: No erythema, warmth, synovitis, or pain of the DIPs, PIPs, or MCPs; normal ROM  Wrists: No erythema, warmth, synovitis, or pain of the wrists; normal ROM  Elbows: No erythema, warmth, synovitis, or pain; normal ROM   Shoulders: there is subacromial tenderness, some tenderness over the ant aspect over the bicipital groove, Left shoulder limited active abudction and flexion to 90 degress  With passive abduction and flexion also restricted to 80 degrees and 70 degrees respectively ( previously around 120 degrees)  External rotation limited > Internal rotation    Left shoulder Ultrasound  Ultrasound of left shoulder performed again did not show any tear of biceps tendon or evidence of bicipital tendinitis, supraspinatus tendon looks normal without suspicion of tear, infraspinatus or sub scapularis poor visualized because of limited dynamic exam    No fluid around the subacromial bursae seen but there is thickening of subacomial bursa anterior views  Could not visualize the GH joint and Labrum well on the ultrasound with the linear probe    Last BMP:   Lab Results   Component Value Date     09/04/2023    K 4.2 09/04/2023     09/04/2023    CO2 27 09/04/2023    BUN 15 09/04/2023    CREATININE 1.29 09/04/2023    CALCIUM 9.5 09/04/2023     Lab Results   Component Value Date    URICACID 5.1 10/16/2023       MRI of the left shoulder with out IV contrast      INDICATION:  Signs/Symptoms:chronic left shoulder pain - rule out RC arthropathy/  Labral tear      COMPARISON:  Shoulder radiographs 02/29/2024.      ACCESSION NUMBER(S):  [id number]      ORDERING CLINICIAN:  VINAYAK GARCIA      TECHNIQUE:  Multiplanar multisequence MRI of the left shoulder was performed  without intravenous contrast.      FINDINGS:  Study is limited by patient motion the coronal series images.  Acromioclavicular  Joint: Mild acromioclavicular joint osteoarthrosis  with osteophytosis. No evidence for abnormal increased fluid in the  subacromial bursa. Mild acromioclavicular joint osteoarthrosis with  osteophytosis.      Biceps Tendon: The intra and extra articular portion of the biceps  tendon are within normal limits. There is mild extra-articular long  head biceps tenosynovitis with small amount of increased tendon  sheath fluid.      Rotator Cuff:  There is mild supraspinatus and anterior infraspinatus tendinosis  with mild thickening and increased intermediate fluid signal. No  supraspinatus and infraspinatus tendon tear. Teres minor and  subscapularis tendons are within normal limits.      Muscles:  Normal signal intensity and volume. No evidence of muscular  edema or atrophy.      Labrum:  Evaluation of labrum is limited due to lack of  intraarticular contrast. There is degenerative tearing of the  superior and posterior glenoid labrum with blunting and irregularity.  There is a chondrolabral junction tearing of the posterosuperior  glenoid labrum as seen on axial image 16.      Articular Cartilage:  The articular cartilage of the glenoid and  humeral head are intact.      Bones:  The marrow signal of the humeral head is within normal  limits. No evidence acute fracture or contusion.      Nerves:  No evidence of mass effect in the region of the  quadrilateral space or suprascapular nerve.      Other: None.      IMPRESSION:  1. Mild supraspinatus and anterior infraspinatus tendinosis without  tear.  2. Chronic degenerative type tearing of the superior and posterior  glenoid labrum with a superimposed chondrolabral junction tear of the  posterosuperior quadrant.    1. Left rotator cuff tear arthropathy  Large Joint Injection/Arthrocentesis: L subacromial bursa              Prcocedure done by Dr. Demarco    Large Joint Injection/Arthrocentesis: L subacromial bursa on 9/9/2024 11:21 AM  Indications: pain  Details: 25 G  needle, ultrasound-guided anterolateral approach  Medications: 1 mL lidocaine 10 mg/mL (1 %); 40 mg triamcinolone acetonide 40 mg/mL  Outcome: tolerated well, no immediate complications    Under direct US guidance, subacromial injection with 40mg kenalog and 1ml lidocaine 1% was injected using anterior approach in modifed CRAS position. Patient tolerated the procedure well  Consent was given by the patient. Patient was prepped and draped in the usual sterile fashion.               Hiral Biswas MD

## 2024-09-10 RX ORDER — LIDOCAINE HYDROCHLORIDE 10 MG/ML
1 INJECTION INFILTRATION; PERINEURAL
Status: COMPLETED | OUTPATIENT
Start: 2024-09-09 | End: 2024-09-09

## 2024-09-10 RX ORDER — TRIAMCINOLONE ACETONIDE 40 MG/ML
40 INJECTION, SUSPENSION INTRA-ARTICULAR; INTRAMUSCULAR
Status: COMPLETED | OUTPATIENT
Start: 2024-09-09 | End: 2024-09-09

## 2024-09-10 NOTE — PROGRESS NOTES
Procedure done by Dr. Demarco  Large Joint Injection/Arthrocentesis: L subacromial bursa on 9/9/2024 11:21 AM  Indications: pain  Details: 25 G needle, ultrasound-guided anterolateral approach  Medications: 1 mL lidocaine 10 mg/mL (1 %); 40 mg triamcinolone acetonide 40 mg/mL  Outcome: tolerated well, no immediate complications    Under direct US guidance, subacromial injection with 40mg kenalog and 1ml lidocaine 1% was injected using anterior approach in modifed CRAS position. Patient tolerated the procedure well  Consent was given by the patient. Patient was prepped and draped in the usual sterile fashion.

## 2024-09-16 ENCOUNTER — APPOINTMENT (OUTPATIENT)
Dept: OPHTHALMOLOGY | Facility: CLINIC | Age: 54
End: 2024-09-16
Payer: MEDICARE

## 2024-09-24 ENCOUNTER — APPOINTMENT (OUTPATIENT)
Dept: PRIMARY CARE | Facility: CLINIC | Age: 54
End: 2024-09-24
Payer: COMMERCIAL

## 2024-10-07 ENCOUNTER — APPOINTMENT (OUTPATIENT)
Dept: ENDOCRINOLOGY | Facility: CLINIC | Age: 54
End: 2024-10-07
Payer: MEDICARE

## 2024-10-08 ENCOUNTER — APPOINTMENT (OUTPATIENT)
Dept: OPHTHALMOLOGY | Facility: CLINIC | Age: 54
End: 2024-10-08
Payer: MEDICARE

## 2024-10-15 ENCOUNTER — APPOINTMENT (OUTPATIENT)
Dept: OPHTHALMOLOGY | Facility: CLINIC | Age: 54
End: 2024-10-15
Payer: COMMERCIAL

## 2024-10-17 ENCOUNTER — TELEPHONE (OUTPATIENT)
Dept: PRIMARY CARE | Facility: CLINIC | Age: 54
End: 2024-10-17
Payer: COMMERCIAL

## 2024-10-25 ENCOUNTER — APPOINTMENT (OUTPATIENT)
Dept: PRIMARY CARE | Facility: CLINIC | Age: 54
End: 2024-10-25
Payer: MEDICARE

## 2024-10-29 ENCOUNTER — OFFICE VISIT (OUTPATIENT)
Dept: CARDIOLOGY | Facility: HOSPITAL | Age: 54
End: 2024-10-29
Payer: MEDICARE

## 2024-10-29 ENCOUNTER — APPOINTMENT (OUTPATIENT)
Dept: PRIMARY CARE | Facility: CLINIC | Age: 54
End: 2024-10-29
Payer: MEDICARE

## 2024-10-29 VITALS
SYSTOLIC BLOOD PRESSURE: 133 MMHG | HEART RATE: 81 BPM | HEIGHT: 71 IN | DIASTOLIC BLOOD PRESSURE: 86 MMHG | OXYGEN SATURATION: 95 % | BODY MASS INDEX: 31.61 KG/M2 | WEIGHT: 225.8 LBS

## 2024-10-29 DIAGNOSIS — R07.89 ATYPICAL CHEST PAIN: Primary | ICD-10-CM

## 2024-10-29 DIAGNOSIS — R06.09 DYSPNEA ON EXERTION: ICD-10-CM

## 2024-10-29 DIAGNOSIS — I10 HYPERTENSION, ESSENTIAL, BENIGN: ICD-10-CM

## 2024-10-29 DIAGNOSIS — I25.10 ATHEROSCLEROSIS OF NATIVE CORONARY ARTERY OF NATIVE HEART WITHOUT ANGINA PECTORIS: ICD-10-CM

## 2024-10-29 PROCEDURE — 3008F BODY MASS INDEX DOCD: CPT | Performed by: INTERNAL MEDICINE

## 2024-10-29 PROCEDURE — 1036F TOBACCO NON-USER: CPT | Performed by: INTERNAL MEDICINE

## 2024-10-29 PROCEDURE — 3079F DIAST BP 80-89 MM HG: CPT | Performed by: INTERNAL MEDICINE

## 2024-10-29 PROCEDURE — 3075F SYST BP GE 130 - 139MM HG: CPT | Performed by: INTERNAL MEDICINE

## 2024-10-29 PROCEDURE — 99213 OFFICE O/P EST LOW 20 MIN: CPT | Performed by: INTERNAL MEDICINE

## 2024-10-29 PROCEDURE — 93005 ELECTROCARDIOGRAM TRACING: CPT | Performed by: INTERNAL MEDICINE

## 2024-10-29 ASSESSMENT — PAIN SCALES - GENERAL: PAINLEVEL_OUTOF10: 2

## 2024-10-30 LAB
ATRIAL RATE: 79 BPM
P AXIS: 66 DEGREES
P OFFSET: 202 MS
P ONSET: 152 MS
PR INTERVAL: 138 MS
Q ONSET: 221 MS
QRS COUNT: 13 BEATS
QRS DURATION: 80 MS
QT INTERVAL: 348 MS
QTC CALCULATION(BAZETT): 399 MS
QTC FREDERICIA: 381 MS
R AXIS: 57 DEGREES
T AXIS: 63 DEGREES
T OFFSET: 395 MS
VENTRICULAR RATE: 79 BPM

## 2024-11-04 ENCOUNTER — APPOINTMENT (OUTPATIENT)
Dept: RHEUMATOLOGY | Facility: CLINIC | Age: 54
End: 2024-11-04
Payer: MEDICARE

## 2024-11-05 ENCOUNTER — APPOINTMENT (OUTPATIENT)
Dept: PRIMARY CARE | Facility: CLINIC | Age: 54
End: 2024-11-05
Payer: MEDICARE

## 2024-11-05 DIAGNOSIS — M25.512 CHRONIC LEFT SHOULDER PAIN: ICD-10-CM

## 2024-11-05 DIAGNOSIS — G89.29 CHRONIC LEFT SHOULDER PAIN: ICD-10-CM

## 2024-11-05 DIAGNOSIS — J06.9 VIRAL UPPER RESPIRATORY TRACT INFECTION: ICD-10-CM

## 2024-11-05 DIAGNOSIS — R91.1 LUNG NODULE: Primary | ICD-10-CM

## 2024-11-05 PROCEDURE — 99213 OFFICE O/P EST LOW 20 MIN: CPT

## 2024-11-05 NOTE — PROGRESS NOTES
"Subjective   Patient ID: Tip Lynn is a 54 y.o. male who presents for SOB.    Virtual or Telephone Consent    A telephone visit (audio only) between the patient (at the originating site) and the provider (at the distant site) was utilized to provide this telehealth service.   Verbal consent was requested and obtained from Tip Lynn on this date, 11/06/24 for a telehealth visit.       HPI    HPI  Hx gout, HTN, HLD, asthma, CARLOS on CPAP, T2DM, seizure, and anxiety      #Lung nodule  - Monday and Friday, the patient reported to the [facility] with a left-sided nodule on CXR. The provider recommended a wheelchair, citing pain in bilateral ankles and lack of strength.  - The patient needs a repeat CXR.  - Last CXR in February 2024 showed streaky right infrahilar and left basilar opacities, likely reflecting atelectasis or airspace disease.  - This past week, the patient had fever (\"over 200°F\"), headaches, nausea, and cough.  - The patient was provided Mucinex.  - Ibuprofen and Robitussin help with the cough.  - The patient lost paperwork from the [facility] encounter.  PLAN:  - Encourage Tylenol, ibuprofen  - Repeat CXR  - Provided ED precautions     #HTN  - The patient reports elevated blood pressure, but is unsure of the exact numbers.  - Currently taking hydrochlorothiazide 25mg daily and amlodipine 10mg.  - The patient cannot afford batteries for their blood pressure cuff.  - The patient admits to shortness of breath.  - denies active CP, headache  PLAN:  -Encouraged to RTC in 1 week for BP check      Review of Systems    Previous history  Past Medical History:   Diagnosis Date    Cough, unspecified 03/22/2017    Cough    Myalgia, other site 12/19/2022    Chronic musculoskeletal pain    Other disorders of lung     Lung trouble    Personal history of other diseases of the circulatory system     History of coronary artery disease    Personal history of other diseases of the circulatory system     " History of hypertension    Personal history of other diseases of the nervous system and sense organs     History of sleep apnea    Personal history of other specified conditions     History of chest pain    Personal history of other specified conditions     History of heartburn    Unspecified convulsions (Multi) 11/14/2022    Seizure     Past Surgical History:   Procedure Laterality Date    ANKLE SURGERY  03/24/2014    Ankle Surgery    CT ANGIO CORONARY ART WITH HEARTFLOW IF SCORE >30%  3/14/2020    CT HEART CORONARY ANGIOGRAM 3/14/2020 [facility] [place]    OTHER SURGICAL HISTORY  04/25/2019    Ear pressure equalization tube insertion    RECTAL POLYPECTOMY  06/30/2016    Rectal Surgery Polypectomy     Social History     Tobacco Use    Smoking status: Never    Smokeless tobacco: Never   Substance Use Topics    Alcohol use: Never    Drug use: Never     Family History   Problem Relation Name Age of Onset    Lung cancer Mother      Coronary artery disease Father       Allergies   Allergen Reactions    Aspirin Anaphylaxis    Topiramate Anxiety and Other     delirium    Salicylates Swelling     Tolerates ibuprofen    Penicillins Hives, Itching and Swelling    Lidopatch [Lidocaine-Menthol] Itching and Rash     Current Outpatient Medications   Medication Instructions    albuterol 90 mcg/actuation inhaler 1 puff, inhalation, Every 6 hours PRN    allopurinol (Zyloprim) 100 mg tablet TAKE 1 TABLET BY MOUTH ONCE DAILY *TOTAL DAILY DOSE IS 400MG IN COMBINATION WITH 300MG*    amLODIPine (Norvasc) 10 mg tablet 1 tablet, Daily    atorvastatin (Lipitor) 40 mg tablet 1 tablet, Daily (0630)    budesonide-formoteroL (Symbicort) 80-4.5 mcg/actuation inhaler 2 puffs, inhalation, 2 times daily RT, Rinse mouth with water after use to reduce aftertaste and incidence of candidiasis. Do not swallow.    ciclopirox (Penlac) 8 % solution Once Weekly    cyclobenzaprine (FLEXERIL) 5 mg    dulaglutide (TRULICITY) 1.5 mg, subcutaneous, Once  "Weekly    Easy Touch Alcohol Prep Pads pads, medicated 3 times daily    hydroCHLOROthiazide (HYDRODiuril) 25 mg tablet 1 tablet, Daily    melatonin 3 mg capsule 1 capsule, Nightly PRN    meloxicam (Mobic) 15 mg tablet TAKE 1 TABLET BY MOUTH ONCE DAILY FOR 15 DAYS    pantoprazole (PROTONIX) 40 mg, oral, Daily    traZODone (Desyrel) 100 mg tablet     Trulicity 0.75 mg, Once Weekly       Objective       Physical Exam  Constitutional:       General: He is not in acute distress.  Neurological:      Mental Status: He is alert.   Psychiatric:         Mood and Affect: Mood normal.       Assessment/Plan   Tip Lynn is a 54 y.o. male who presents for the concerns below:    Problem List Items Addressed This Visit       Chronic left shoulder pain    Relevant Orders    Referral to Pain Medicine     Other Visit Diagnoses       Lung nodule    -  Primary    Relevant Orders    XR chest 2 views    Viral upper respiratory tract infection                 #Lung nodule  - Monday and Friday, the patient reported to the [facility] with a left-sided nodule on CXR. The provider recommended a wheelchair, citing pain in bilateral ankles and lack of strength.  - The patient needs a repeat CXR.  - Last CXR in February 2024 showed streaky right infrahilar and left basilar opacities, likely reflecting atelectasis or airspace disease.  - This past week, the patient had fever (\"over 200°F\"), headaches, nausea, and cough.  - The patient was provided Mucinex.  - Ibuprofen and Robitussin help with the cough.  - The patient lost paperwork from the [facility] encounter.  PLAN:  - Encourage Tylenol, ibuprofen  - Repeat CXR  - Provided ED precautions     #HTN  - The patient reports elevated blood pressure, but is unsure of the exact numbers.  - Currently taking hydrochlorothiazide 25mg daily and amlodipine 10mg.  - The patient cannot afford batteries for their blood pressure cuff.  - The patient admits to shortness of breath.  - denies active " CP, headache  PLAN:  -Encouraged to RTC in 1 week for BP check    Discussed with co-signer of note Dr. Garcia      Portions of this note were generated using digital voice recognition software, and may contain grammatical errors       Christ Weston, DO  Family Medicine PGY-3   Name band;

## 2024-11-12 ENCOUNTER — APPOINTMENT (OUTPATIENT)
Dept: PRIMARY CARE | Facility: CLINIC | Age: 54
End: 2024-11-12
Payer: MEDICARE

## 2024-11-13 ENCOUNTER — APPOINTMENT (OUTPATIENT)
Dept: OTOLARYNGOLOGY | Facility: CLINIC | Age: 54
End: 2024-11-13
Payer: MEDICARE

## 2024-11-21 ENCOUNTER — APPOINTMENT (OUTPATIENT)
Dept: OTOLARYNGOLOGY | Facility: CLINIC | Age: 54
End: 2024-11-21
Payer: MEDICARE

## 2024-11-22 DIAGNOSIS — M1A.00X0 IDIOPATHIC CHRONIC GOUT WITHOUT TOPHUS, UNSPECIFIED SITE: ICD-10-CM

## 2024-11-25 ENCOUNTER — HOSPITAL ENCOUNTER (EMERGENCY)
Facility: HOSPITAL | Age: 54
Discharge: HOME | DRG: 489 | End: 2024-11-25
Payer: MEDICARE

## 2024-11-25 ENCOUNTER — APPOINTMENT (OUTPATIENT)
Dept: RADIOLOGY | Facility: HOSPITAL | Age: 54
DRG: 489 | End: 2024-11-25
Payer: MEDICARE

## 2024-11-25 VITALS
RESPIRATION RATE: 19 BRPM | HEART RATE: 72 BPM | TEMPERATURE: 97.9 F | DIASTOLIC BLOOD PRESSURE: 92 MMHG | OXYGEN SATURATION: 97 % | SYSTOLIC BLOOD PRESSURE: 145 MMHG

## 2024-11-25 DIAGNOSIS — M25.561 ACUTE PAIN OF RIGHT KNEE: Primary | ICD-10-CM

## 2024-11-25 PROCEDURE — 73562 X-RAY EXAM OF KNEE 3: CPT | Mod: RT

## 2024-11-25 PROCEDURE — 99285 EMERGENCY DEPT VISIT HI MDM: CPT

## 2024-11-25 PROCEDURE — 73590 X-RAY EXAM OF LOWER LEG: CPT | Mod: RT

## 2024-11-25 PROCEDURE — 73590 X-RAY EXAM OF LOWER LEG: CPT | Mod: RIGHT SIDE | Performed by: STUDENT IN AN ORGANIZED HEALTH CARE EDUCATION/TRAINING PROGRAM

## 2024-11-25 PROCEDURE — 99284 EMERGENCY DEPT VISIT MOD MDM: CPT | Mod: 25

## 2024-11-25 PROCEDURE — 73562 X-RAY EXAM OF KNEE 3: CPT | Mod: RIGHT SIDE | Performed by: STUDENT IN AN ORGANIZED HEALTH CARE EDUCATION/TRAINING PROGRAM

## 2024-11-25 RX ORDER — ACETAMINOPHEN 500 MG
1000 TABLET ORAL EVERY 6 HOURS PRN
Qty: 30 TABLET | Refills: 0 | Status: ON HOLD | OUTPATIENT
Start: 2024-11-25 | End: 2024-12-05

## 2024-11-25 ASSESSMENT — PAIN DESCRIPTION - ORIENTATION: ORIENTATION: RIGHT

## 2024-11-25 ASSESSMENT — PAIN DESCRIPTION - LOCATION: LOCATION: KNEE

## 2024-11-25 ASSESSMENT — PAIN DESCRIPTION - PAIN TYPE: TYPE: CHRONIC PAIN

## 2024-11-25 ASSESSMENT — PAIN SCALES - GENERAL: PAINLEVEL_OUTOF10: 9

## 2024-11-25 ASSESSMENT — PAIN - FUNCTIONAL ASSESSMENT: PAIN_FUNCTIONAL_ASSESSMENT: 0-10

## 2024-11-25 NOTE — DISCHARGE INSTRUCTIONS
You were seen emerged part today for concerns of right knee pain.  A thorough examination was performed including x-rays.  X-rays did not show evidence of acute abnormalities.  It is likely that you have osteoarthritis or rheumatoid arthritis in that knee.  A referral for orthopedic surgery has been placed.  They should be contacting you to schedule an appointment in the next day or 2.  It is recommended you keep this appointment.  In addition, a prescription for Tylenol has been sent to your pharmacy.  Please take as prescribed.  You are safely discharged at this time.  However, should you have any new, worsening, or concerning symptoms please report back to the emergency department.

## 2024-11-25 NOTE — ED PROVIDER NOTES
HPI   No chief complaint on file.      HPI  Patient is a 53-year-old male with a past medical history of HTN and T2DM who presents to the emergency department for right knee pain.  Patient states that his right knee has been painful over the past week, worse over the past couple days.  He states that someone came to his house and took an x-ray on Friday and told him that he likely has rheumatoid arthritis.  They told him that he should continue to try to walk with a walker.  However, he states that the pain is still very substantial and now it feels swollen.  He states that the pain is worse on the medial aspect of his knee but he does have pain on the lateral aspect, as well.  Patient states that he was told he has rheumatoid arthritis and a chip in the medial aspect of his knee.  Patient denies any known trauma to the area or awkward motions.  Patient states that he does have a history of gout in both of his feet but has not had a flareup in over a year.  Patient states he does take medication for gout but does remember what is called.  Patient denies any other systemic symptoms including fevers, recent illnesses, chest pain, shortness of breath, abdominal pain, nausea or vomiting, changes in urination or bowel habits.      Patient History   Past Medical History:   Diagnosis Date    Cough, unspecified 03/22/2017    Cough    Myalgia, other site 12/19/2022    Chronic musculoskeletal pain    Other disorders of lung     Lung trouble    Personal history of other diseases of the circulatory system     History of coronary artery disease    Personal history of other diseases of the circulatory system     History of hypertension    Personal history of other diseases of the nervous system and sense organs     History of sleep apnea    Personal history of other specified conditions     History of chest pain    Personal history of other specified conditions     History of heartburn    Unspecified convulsions (Multi)  11/14/2022    Seizure     Past Surgical History:   Procedure Laterality Date    ANKLE SURGERY  03/24/2014    Ankle Surgery    CT ANGIO CORONARY ART WITH HEARTFLOW IF SCORE >30%  3/14/2020    [place] HEART CORONARY [place] [facility] [place]    OTHER SURGICAL HISTORY  04/25/2019    Ear pressure equalization tube insertion    RECTAL POLYPECTOMY  06/30/2016    Rectal Surgery Polypectomy     Family History   Problem Relation Name Age of Onset    Lung cancer Mother      Coronary artery disease Father       Social History     Tobacco Use    Smoking status: Never    Smokeless tobacco: Never   Substance Use Topics    Alcohol use: Never    Drug use: Never       Physical Exam   ED Triage Vitals   Temp Pulse Resp BP   -- -- -- --      SpO2 Temp src Heart Rate Source Patient Position   -- -- -- --      BP Location FiO2 (%)     -- --       Physical Exam  Vitals and nursing note reviewed.   Constitutional:       General: He is not in acute distress.     Appearance: He is well-developed.   HENT:      Head: Normocephalic and atraumatic.   Eyes:      Conjunctiva/sclera: Conjunctivae normal.   Cardiovascular:      Rate and Rhythm: Normal rate and regular rhythm.      Heart sounds: No murmur heard.  Pulmonary:      Effort: Pulmonary effort is normal. No respiratory distress.      Breath sounds: Normal breath sounds.   Abdominal:      Palpations: Abdomen is soft.      Tenderness: There is no abdominal tenderness.   Musculoskeletal:         General: No swelling.      Cervical back: Neck supple.      Comments: Pain to palpation to both lateral and medial aspects of right knee, worse on medial side  Edema surrounding right knee   Skin:     General: Skin is warm and dry.      Capillary Refill: Capillary refill takes less than 2 seconds.   Neurological:      Mental Status: He is alert.   Psychiatric:         Mood and Affect: Mood normal.       ED Course & MDM   Diagnoses as of 11/25/24 0603   Acute pain of right knee       Medical  Decision Making  Patient is a 53-year-old male with a past medical history of HTN and T2DM who presents to the emergency department for right knee pain.  Patient was hypertensive at 145/92 with other vital stable and within normal limits.  An x-ray was ordered of the patient's right knee and tib-fib.  An ultrasound was performed and showed small amount of fluid surrounding the knee without evidence of cellulitis.  Patient's x-rays showed:  1. No acute osseous abnormality of the right knee or tibia/fibula.  2. Small-sized knee joint effusion.  3. Heterotopic ossification in the calf that may be related to remote  muscle strain or direct blunt trauma.  It was suggested that some fluid to be taken from the knee joint but the patient stated he preferred not to be poked by a needle.  Patient given a referral for orthopedics for follow-up.  Patient also given a prescription for Tylenol for pain control as he has an allergy to salicylates and aspirin.  Patient discharged in good condition with strict return precautions.  Patient understood and was agreeable with the plan.'    Procedure  Procedures none     Siddharth Gibson DO  Resident  11/25/24 7010

## 2024-11-26 ENCOUNTER — HOSPITAL ENCOUNTER (INPATIENT)
Facility: HOSPITAL | Age: 54
DRG: 489 | End: 2024-11-26
Attending: EMERGENCY MEDICINE | Admitting: NURSE PRACTITIONER
Payer: MEDICARE

## 2024-11-26 ENCOUNTER — CLINICAL SUPPORT (OUTPATIENT)
Dept: EMERGENCY MEDICINE | Facility: HOSPITAL | Age: 54
DRG: 489 | End: 2024-11-26
Payer: MEDICARE

## 2024-11-26 ENCOUNTER — APPOINTMENT (OUTPATIENT)
Dept: RADIOLOGY | Facility: HOSPITAL | Age: 54
DRG: 489 | End: 2024-11-26
Payer: MEDICARE

## 2024-11-26 DIAGNOSIS — M11.20 CALCIUM PYROPHOSPHATE DEPOSITION DISEASE: ICD-10-CM

## 2024-11-26 DIAGNOSIS — M12.812 ROTATOR CUFF ARTHROPATHY OF LEFT SHOULDER: ICD-10-CM

## 2024-11-26 DIAGNOSIS — M00.9 PYOGENIC ARTHRITIS OF RIGHT KNEE JOINT, DUE TO UNSPECIFIED ORGANISM (MULTI): Primary | ICD-10-CM

## 2024-11-26 LAB
ABO GROUP (TYPE) IN BLOOD: NORMAL
ALBUMIN SERPL BCP-MCNC: 4.4 G/DL (ref 3.4–5)
ALP SERPL-CCNC: 58 U/L (ref 33–120)
ALT SERPL W P-5'-P-CCNC: 23 U/L (ref 10–52)
ANION GAP SERPL CALC-SCNC: 14 MMOL/L (ref 10–20)
ANTIBODY SCREEN: NORMAL
APTT PPP: 37 SECONDS (ref 27–38)
AST SERPL W P-5'-P-CCNC: 20 U/L (ref 9–39)
ATRIAL RATE: 76 BPM
BASOPHILS # BLD AUTO: 0.02 X10*3/UL (ref 0–0.1)
BASOPHILS NFR BLD AUTO: 0.3 %
BASOPHILS NFR FLD MANUAL: 0 %
BILIRUB SERPL-MCNC: 0.7 MG/DL (ref 0–1.2)
BLASTS NFR FLD MANUAL: 0 %
BUN SERPL-MCNC: 13 MG/DL (ref 6–23)
CALCIUM SERPL-MCNC: 9.1 MG/DL (ref 8.6–10.6)
CHLORIDE SERPL-SCNC: 103 MMOL/L (ref 98–107)
CLARITY FLD: ABNORMAL
CO2 SERPL-SCNC: 23 MMOL/L (ref 21–32)
COLOR FLD: YELLOW
CREAT SERPL-MCNC: 1.02 MG/DL (ref 0.5–1.3)
CRP SERPL-MCNC: 4.53 MG/DL
EGFRCR SERPLBLD CKD-EPI 2021: 88 ML/MIN/1.73M*2
EOSINOPHIL # BLD AUTO: 0.06 X10*3/UL (ref 0–0.7)
EOSINOPHIL NFR BLD AUTO: 0.8 %
EOSINOPHIL NFR FLD MANUAL: 0 %
ERYTHROCYTE [DISTWIDTH] IN BLOOD BY AUTOMATED COUNT: 13.6 % (ref 11.5–14.5)
ERYTHROCYTE [SEDIMENTATION RATE] IN BLOOD BY WESTERGREN METHOD: 23 MM/H (ref 0–20)
EST. AVERAGE GLUCOSE BLD GHB EST-MCNC: 103 MG/DL
GLUCOSE BLD MANUAL STRIP-MCNC: 128 MG/DL (ref 74–99)
GLUCOSE BLD MANUAL STRIP-MCNC: 97 MG/DL (ref 74–99)
GLUCOSE FLD-MCNC: 84 MG/DL
GLUCOSE SERPL-MCNC: 115 MG/DL (ref 74–99)
HBA1C MFR BLD: 5.2 %
HCT VFR BLD AUTO: 41.6 % (ref 41–52)
HGB BLD-MCNC: 15 G/DL (ref 13.5–17.5)
HOLD SPECIMEN: NORMAL
IMM GRANULOCYTES # BLD AUTO: 0.02 X10*3/UL (ref 0–0.7)
IMM GRANULOCYTES NFR BLD AUTO: 0.3 % (ref 0–0.9)
IMMATURE GRANULOCYTES IN FLUID: 0 %
INR PPP: 1.1 (ref 0.9–1.1)
LYMPHOCYTES # BLD AUTO: 1.39 X10*3/UL (ref 1.2–4.8)
LYMPHOCYTES NFR BLD AUTO: 19.4 %
LYMPHOCYTES NFR FLD MANUAL: 1 %
MCH RBC QN AUTO: 30.3 PG (ref 26–34)
MCHC RBC AUTO-ENTMCNC: 36.1 G/DL (ref 32–36)
MCV RBC AUTO: 84 FL (ref 80–100)
MONOCYTES # BLD AUTO: 0.65 X10*3/UL (ref 0.1–1)
MONOCYTES NFR BLD AUTO: 9.1 %
MONOS+MACROS NFR FLD MANUAL: 10 %
NEUTROPHILS # BLD AUTO: 5.03 X10*3/UL (ref 1.2–7.7)
NEUTROPHILS NFR BLD AUTO: 70.1 %
NEUTROPHILS NFR FLD MANUAL: 89 %
NRBC BLD-RTO: 0 /100 WBCS (ref 0–0)
OTHER CELLS NFR FLD MANUAL: 0 %
P AXIS: 75 DEGREES
P OFFSET: 204 MS
P ONSET: 166 MS
PLASMA CELLS NFR FLD MANUAL: 0 %
PLATELET # BLD AUTO: 219 X10*3/UL (ref 150–450)
POTASSIUM SERPL-SCNC: 3.8 MMOL/L (ref 3.5–5.3)
PR INTERVAL: 116 MS
PROT FLD-MCNC: 2.9 G/DL
PROT SERPL-MCNC: 7.6 G/DL (ref 6.4–8.2)
PROTHROMBIN TIME: 12.6 SECONDS (ref 9.8–12.8)
Q ONSET: 224 MS
QRS COUNT: 12 BEATS
QRS DURATION: 72 MS
QT INTERVAL: 362 MS
QTC CALCULATION(BAZETT): 407 MS
QTC FREDERICIA: 391 MS
R AXIS: 56 DEGREES
RBC # BLD AUTO: 4.95 X10*6/UL (ref 4.5–5.9)
RBC # FLD AUTO: 0 /UL
RH FACTOR (ANTIGEN D): NORMAL
SODIUM SERPL-SCNC: 136 MMOL/L (ref 136–145)
T AXIS: 63 DEGREES
T OFFSET: 405 MS
TOTAL CELLS COUNTED SNV: 100
VENTRICULAR RATE: 76 BPM
WBC # BLD AUTO: 7.2 X10*3/UL (ref 4.4–11.3)
WBC # FLD AUTO: ABNORMAL /UL

## 2024-11-26 PROCEDURE — 82947 ASSAY GLUCOSE BLOOD QUANT: CPT

## 2024-11-26 PROCEDURE — 93971 EXTREMITY STUDY: CPT

## 2024-11-26 PROCEDURE — 1100000001 HC PRIVATE ROOM DAILY

## 2024-11-26 PROCEDURE — 93005 ELECTROCARDIOGRAM TRACING: CPT

## 2024-11-26 PROCEDURE — 2500000001 HC RX 250 WO HCPCS SELF ADMINISTERED DRUGS (ALT 637 FOR MEDICARE OP)

## 2024-11-26 PROCEDURE — 82945 GLUCOSE OTHER FLUID: CPT

## 2024-11-26 PROCEDURE — 0S9C3ZX DRAINAGE OF RIGHT KNEE JOINT, PERCUTANEOUS APPROACH, DIAGNOSTIC: ICD-10-PCS

## 2024-11-26 PROCEDURE — 89051 BODY FLUID CELL COUNT: CPT

## 2024-11-26 PROCEDURE — 93010 ELECTROCARDIOGRAM REPORT: CPT

## 2024-11-26 PROCEDURE — 89060 EXAM SYNOVIAL FLUID CRYSTALS: CPT | Performed by: PATHOLOGY

## 2024-11-26 PROCEDURE — 83036 HEMOGLOBIN GLYCOSYLATED A1C: CPT | Performed by: NURSE PRACTITIONER

## 2024-11-26 PROCEDURE — 80053 COMPREHEN METABOLIC PANEL: CPT

## 2024-11-26 PROCEDURE — 85652 RBC SED RATE AUTOMATED: CPT

## 2024-11-26 PROCEDURE — 87070 CULTURE OTHR SPECIMN AEROBIC: CPT

## 2024-11-26 PROCEDURE — 2500000001 HC RX 250 WO HCPCS SELF ADMINISTERED DRUGS (ALT 637 FOR MEDICARE OP): Performed by: NURSE PRACTITIONER

## 2024-11-26 PROCEDURE — 93971 EXTREMITY STUDY: CPT | Performed by: RADIOLOGY

## 2024-11-26 PROCEDURE — 99221 1ST HOSP IP/OBS SF/LOW 40: CPT

## 2024-11-26 PROCEDURE — 87075 CULTR BACTERIA EXCEPT BLOOD: CPT

## 2024-11-26 PROCEDURE — 20611 DRAIN/INJ JOINT/BURSA W/US: CPT | Performed by: EMERGENCY MEDICINE

## 2024-11-26 PROCEDURE — 71045 X-RAY EXAM CHEST 1 VIEW: CPT

## 2024-11-26 PROCEDURE — 2500000004 HC RX 250 GENERAL PHARMACY W/ HCPCS (ALT 636 FOR OP/ED): Performed by: NURSE PRACTITIONER

## 2024-11-26 PROCEDURE — 99285 EMERGENCY DEPT VISIT HI MDM: CPT | Performed by: EMERGENCY MEDICINE

## 2024-11-26 PROCEDURE — 85730 THROMBOPLASTIN TIME PARTIAL: CPT

## 2024-11-26 PROCEDURE — 84157 ASSAY OF PROTEIN OTHER: CPT

## 2024-11-26 PROCEDURE — 86140 C-REACTIVE PROTEIN: CPT

## 2024-11-26 PROCEDURE — 85025 COMPLETE CBC W/AUTO DIFF WBC: CPT

## 2024-11-26 PROCEDURE — 20611 DRAIN/INJ JOINT/BURSA W/US: CPT

## 2024-11-26 PROCEDURE — 36415 COLL VENOUS BLD VENIPUNCTURE: CPT

## 2024-11-26 PROCEDURE — 86900 BLOOD TYPING SEROLOGIC ABO: CPT

## 2024-11-26 PROCEDURE — 89060 EXAM SYNOVIAL FLUID CRYSTALS: CPT

## 2024-11-26 PROCEDURE — 99285 EMERGENCY DEPT VISIT HI MDM: CPT | Mod: 25

## 2024-11-26 PROCEDURE — 99223 1ST HOSP IP/OBS HIGH 75: CPT | Performed by: NURSE PRACTITIONER

## 2024-11-26 RX ORDER — ALLOPURINOL 100 MG/1
400 TABLET ORAL DAILY
Status: DISCONTINUED | OUTPATIENT
Start: 2024-11-27 | End: 2024-12-02 | Stop reason: HOSPADM

## 2024-11-26 RX ORDER — METFORMIN HYDROCHLORIDE 500 MG/1
500 TABLET ORAL
COMMUNITY

## 2024-11-26 RX ORDER — INSULIN LISPRO 100 [IU]/ML
0-10 INJECTION, SOLUTION INTRAVENOUS; SUBCUTANEOUS
Status: DISCONTINUED | OUTPATIENT
Start: 2024-11-26 | End: 2024-12-02 | Stop reason: HOSPADM

## 2024-11-26 RX ORDER — ACETAMINOPHEN 325 MG/1
975 TABLET ORAL EVERY 6 HOURS
Status: DISCONTINUED | OUTPATIENT
Start: 2024-11-26 | End: 2024-12-02 | Stop reason: HOSPADM

## 2024-11-26 RX ORDER — LIDOCAINE HYDROCHLORIDE 10 MG/ML
5 INJECTION, SOLUTION INFILTRATION; PERINEURAL ONCE
Status: CANCELLED | OUTPATIENT
Start: 2024-11-26 | End: 2024-11-26

## 2024-11-26 RX ORDER — DEXTROSE 50 % IN WATER (D50W) INTRAVENOUS SYRINGE
12.5
Status: DISCONTINUED | OUTPATIENT
Start: 2024-11-26 | End: 2024-12-02 | Stop reason: HOSPADM

## 2024-11-26 RX ORDER — TRAZODONE HYDROCHLORIDE 50 MG/1
100 TABLET ORAL NIGHTLY
Status: DISCONTINUED | OUTPATIENT
Start: 2024-11-26 | End: 2024-12-02 | Stop reason: HOSPADM

## 2024-11-26 RX ORDER — ATORVASTATIN CALCIUM 40 MG/1
40 TABLET, FILM COATED ORAL
Status: DISCONTINUED | OUTPATIENT
Start: 2024-11-26 | End: 2024-12-02 | Stop reason: HOSPADM

## 2024-11-26 RX ORDER — OXYCODONE HYDROCHLORIDE 5 MG/1
5 TABLET ORAL EVERY 4 HOURS PRN
Status: DISCONTINUED | OUTPATIENT
Start: 2024-11-26 | End: 2024-11-27

## 2024-11-26 RX ORDER — ACETAMINOPHEN 325 MG/1
975 TABLET ORAL ONCE
Status: COMPLETED | OUTPATIENT
Start: 2024-11-26 | End: 2024-11-26

## 2024-11-26 RX ORDER — ALLOPURINOL 100 MG/1
100 TABLET ORAL ONCE
Status: COMPLETED | OUTPATIENT
Start: 2024-11-26 | End: 2024-11-26

## 2024-11-26 RX ORDER — EMTRICITABINE AND TENOFOVIR ALAFENAMIDE 200; 25 MG/1; MG/1
1 TABLET ORAL
COMMUNITY
Start: 2024-10-24

## 2024-11-26 RX ORDER — KETOROLAC TROMETHAMINE 15 MG/ML
15 INJECTION, SOLUTION INTRAMUSCULAR; INTRAVENOUS EVERY 6 HOURS PRN
Status: DISPENSED | OUTPATIENT
Start: 2024-11-26 | End: 2024-12-01

## 2024-11-26 RX ORDER — METHOCARBAMOL 500 MG/1
1000 TABLET, FILM COATED ORAL ONCE
Status: COMPLETED | OUTPATIENT
Start: 2024-11-26 | End: 2024-11-26

## 2024-11-26 RX ORDER — AMOXICILLIN 250 MG
2 CAPSULE ORAL 2 TIMES DAILY
Status: DISCONTINUED | OUTPATIENT
Start: 2024-11-26 | End: 2024-12-02 | Stop reason: HOSPADM

## 2024-11-26 RX ORDER — HYDROCHLOROTHIAZIDE 25 MG/1
12.5 TABLET ORAL DAILY
Status: DISCONTINUED | OUTPATIENT
Start: 2024-11-26 | End: 2024-12-02 | Stop reason: HOSPADM

## 2024-11-26 RX ORDER — AMLODIPINE BESYLATE 5 MG/1
5 TABLET ORAL DAILY
Status: DISCONTINUED | OUTPATIENT
Start: 2024-11-26 | End: 2024-11-27

## 2024-11-26 RX ORDER — ALLOPURINOL 300 MG/1
300 TABLET ORAL DAILY
COMMUNITY
End: 2024-11-26 | Stop reason: WASHOUT

## 2024-11-26 RX ORDER — CYCLOBENZAPRINE HCL 10 MG
5 TABLET ORAL 2 TIMES DAILY PRN
Status: DISCONTINUED | OUTPATIENT
Start: 2024-11-26 | End: 2024-12-02 | Stop reason: HOSPADM

## 2024-11-26 RX ORDER — DEXTROSE 50 % IN WATER (D50W) INTRAVENOUS SYRINGE
25
Status: DISCONTINUED | OUTPATIENT
Start: 2024-11-26 | End: 2024-12-02 | Stop reason: HOSPADM

## 2024-11-26 RX ORDER — PANTOPRAZOLE SODIUM 40 MG/1
40 TABLET, DELAYED RELEASE ORAL DAILY
Status: DISCONTINUED | OUTPATIENT
Start: 2024-11-26 | End: 2024-12-02 | Stop reason: HOSPADM

## 2024-11-26 RX ORDER — FLUTICASONE FUROATE AND VILANTEROL 100; 25 UG/1; UG/1
1 POWDER RESPIRATORY (INHALATION)
Status: DISCONTINUED | OUTPATIENT
Start: 2024-11-26 | End: 2024-12-02 | Stop reason: HOSPADM

## 2024-11-26 SDOH — SOCIAL STABILITY: SOCIAL INSECURITY: WERE YOU ABLE TO COMPLETE ALL THE BEHAVIORAL HEALTH SCREENINGS?: YES

## 2024-11-26 SDOH — ECONOMIC STABILITY: INCOME INSECURITY: IN THE PAST 12 MONTHS HAS THE ELECTRIC, GAS, OIL, OR WATER COMPANY THREATENED TO SHUT OFF SERVICES IN YOUR HOME?: YES

## 2024-11-26 SDOH — SOCIAL STABILITY: SOCIAL INSECURITY
WITHIN THE LAST YEAR, HAVE YOU BEEN RAPED OR FORCED TO HAVE ANY KIND OF SEXUAL ACTIVITY BY YOUR PARTNER OR EX-PARTNER?: NO

## 2024-11-26 SDOH — SOCIAL STABILITY: SOCIAL INSECURITY: WITHIN THE LAST YEAR, HAVE YOU BEEN AFRAID OF YOUR PARTNER OR EX-PARTNER?: NO

## 2024-11-26 SDOH — ECONOMIC STABILITY: FOOD INSECURITY: WITHIN THE PAST 12 MONTHS, THE FOOD YOU BOUGHT JUST DIDN'T LAST AND YOU DIDN'T HAVE MONEY TO GET MORE.: OFTEN TRUE

## 2024-11-26 SDOH — HEALTH STABILITY: MENTAL HEALTH
DO YOU FEEL STRESS - TENSE, RESTLESS, NERVOUS, OR ANXIOUS, OR UNABLE TO SLEEP AT NIGHT BECAUSE YOUR MIND IS TROUBLED ALL THE TIME - THESE DAYS?: NOT AT ALL

## 2024-11-26 SDOH — SOCIAL STABILITY: SOCIAL INSECURITY
WITHIN THE LAST YEAR, HAVE YOU BEEN KICKED, HIT, SLAPPED, OR OTHERWISE PHYSICALLY HURT BY YOUR PARTNER OR EX-PARTNER?: NO

## 2024-11-26 SDOH — SOCIAL STABILITY: SOCIAL INSECURITY: HAVE YOU HAD ANY THOUGHTS OF HARMING ANYONE ELSE?: NO

## 2024-11-26 SDOH — SOCIAL STABILITY: SOCIAL INSECURITY: HAVE YOU HAD THOUGHTS OF HARMING ANYONE ELSE?: NO

## 2024-11-26 SDOH — SOCIAL STABILITY: SOCIAL INSECURITY: WITHIN THE LAST YEAR, HAVE YOU BEEN HUMILIATED OR EMOTIONALLY ABUSED IN OTHER WAYS BY YOUR PARTNER OR EX-PARTNER?: NO

## 2024-11-26 SDOH — SOCIAL STABILITY: SOCIAL INSECURITY: DOES ANYONE TRY TO KEEP YOU FROM HAVING/CONTACTING OTHER FRIENDS OR DOING THINGS OUTSIDE YOUR HOME?: NO

## 2024-11-26 SDOH — ECONOMIC STABILITY: FOOD INSECURITY: WITHIN THE PAST 12 MONTHS, YOU WORRIED THAT YOUR FOOD WOULD RUN OUT BEFORE YOU GOT THE MONEY TO BUY MORE.: OFTEN TRUE

## 2024-11-26 SDOH — SOCIAL STABILITY: SOCIAL INSECURITY: DO YOU FEEL UNSAFE GOING BACK TO THE PLACE WHERE YOU ARE LIVING?: NO

## 2024-11-26 SDOH — SOCIAL STABILITY: SOCIAL INSECURITY: HAS ANYONE EVER THREATENED TO HURT YOUR FAMILY OR YOUR PETS?: NO

## 2024-11-26 SDOH — SOCIAL STABILITY: SOCIAL INSECURITY: ARE YOU OR HAVE YOU BEEN THREATENED OR ABUSED PHYSICALLY, EMOTIONALLY, OR SEXUALLY BY ANYONE?: NO

## 2024-11-26 SDOH — SOCIAL STABILITY: SOCIAL INSECURITY: ABUSE: ADULT

## 2024-11-26 SDOH — SOCIAL STABILITY: SOCIAL INSECURITY: ARE THERE ANY APPARENT SIGNS OF INJURIES/BEHAVIORS THAT COULD BE RELATED TO ABUSE/NEGLECT?: NO

## 2024-11-26 SDOH — SOCIAL STABILITY: SOCIAL INSECURITY: DO YOU FEEL ANYONE HAS EXPLOITED OR TAKEN ADVANTAGE OF YOU FINANCIALLY OR OF YOUR PERSONAL PROPERTY?: NO

## 2024-11-26 ASSESSMENT — PAIN SCALES - GENERAL
PAINLEVEL_OUTOF10: 5 - MODERATE PAIN
PAINLEVEL_OUTOF10: 0 - NO PAIN
PAINLEVEL_OUTOF10: 8
PAINLEVEL_OUTOF10: 8
PAINLEVEL_OUTOF10: 5 - MODERATE PAIN

## 2024-11-26 ASSESSMENT — ACTIVITIES OF DAILY LIVING (ADL)
TOILETING: INDEPENDENT
PATIENT'S MEMORY ADEQUATE TO SAFELY COMPLETE DAILY ACTIVITIES?: YES
ADEQUATE_TO_COMPLETE_ADL: YES
GROOMING: INDEPENDENT
HEARING - LEFT EAR: FUNCTIONAL
FEEDING YOURSELF: INDEPENDENT
BATHING: INDEPENDENT
HEARING - RIGHT EAR: FUNCTIONAL
DRESSING YOURSELF: INDEPENDENT
WALKS IN HOME: INDEPENDENT
LACK_OF_TRANSPORTATION: YES
ASSISTIVE_DEVICE: WALKER
JUDGMENT_ADEQUATE_SAFELY_COMPLETE_DAILY_ACTIVITIES: YES

## 2024-11-26 ASSESSMENT — LIFESTYLE VARIABLES
HAVE YOU EVER FELT YOU SHOULD CUT DOWN ON YOUR DRINKING: NO
SKIP TO QUESTIONS 9-10: 1
PRESCIPTION_ABUSE_PAST_12_MONTHS: NO
HOW OFTEN DO YOU HAVE A DRINK CONTAINING ALCOHOL: NEVER
TOTAL SCORE: 0
AUDIT-C TOTAL SCORE: 0
EVER FELT BAD OR GUILTY ABOUT YOUR DRINKING: NO
EVER HAD A DRINK FIRST THING IN THE MORNING TO STEADY YOUR NERVES TO GET RID OF A HANGOVER: NO
HAVE PEOPLE ANNOYED YOU BY CRITICIZING YOUR DRINKING: NO
HOW MANY STANDARD DRINKS CONTAINING ALCOHOL DO YOU HAVE ON A TYPICAL DAY: PATIENT DOES NOT DRINK
HOW OFTEN DO YOU HAVE 6 OR MORE DRINKS ON ONE OCCASION: NEVER
SUBSTANCE_ABUSE_PAST_12_MONTHS: NO
AUDIT-C TOTAL SCORE: 0

## 2024-11-26 ASSESSMENT — PATIENT HEALTH QUESTIONNAIRE - PHQ9
1. LITTLE INTEREST OR PLEASURE IN DOING THINGS: NOT AT ALL
2. FEELING DOWN, DEPRESSED OR HOPELESS: NOT AT ALL
SUM OF ALL RESPONSES TO PHQ9 QUESTIONS 1 & 2: 0

## 2024-11-26 ASSESSMENT — COLUMBIA-SUICIDE SEVERITY RATING SCALE - C-SSRS
1. IN THE PAST MONTH, HAVE YOU WISHED YOU WERE DEAD OR WISHED YOU COULD GO TO SLEEP AND NOT WAKE UP?: NO
2. HAVE YOU ACTUALLY HAD ANY THOUGHTS OF KILLING YOURSELF?: NO
2. HAVE YOU ACTUALLY HAD ANY THOUGHTS OF KILLING YOURSELF?: NO
6. HAVE YOU EVER DONE ANYTHING, STARTED TO DO ANYTHING, OR PREPARED TO DO ANYTHING TO END YOUR LIFE?: NO
6. HAVE YOU EVER DONE ANYTHING, STARTED TO DO ANYTHING, OR PREPARED TO DO ANYTHING TO END YOUR LIFE?: NO
1. IN THE PAST MONTH, HAVE YOU WISHED YOU WERE DEAD OR WISHED YOU COULD GO TO SLEEP AND NOT WAKE UP?: NO

## 2024-11-26 ASSESSMENT — COGNITIVE AND FUNCTIONAL STATUS - GENERAL
DAILY ACTIVITIY SCORE: 15
TOILETING: A LOT
DRESSING REGULAR LOWER BODY CLOTHING: A LOT
PATIENT BASELINE BEDBOUND: NO
PERSONAL GROOMING: A LITTLE
MOVING TO AND FROM BED TO CHAIR: A LITTLE
STANDING UP FROM CHAIR USING ARMS: A LITTLE
DRESSING REGULAR UPPER BODY CLOTHING: A LOT
CLIMB 3 TO 5 STEPS WITH RAILING: A LOT
MOBILITY SCORE: 18
WALKING IN HOSPITAL ROOM: A LOT
HELP NEEDED FOR BATHING: A LOT

## 2024-11-26 ASSESSMENT — PAIN DESCRIPTION - ORIENTATION: ORIENTATION: RIGHT

## 2024-11-26 ASSESSMENT — ENCOUNTER SYMPTOMS
CARDIOVASCULAR NEGATIVE: 1
MYALGIAS: 1
JOINT SWELLING: 1
EYES NEGATIVE: 1
CONSTITUTIONAL NEGATIVE: 1
GASTROINTESTINAL NEGATIVE: 1
NEUROLOGICAL NEGATIVE: 1

## 2024-11-26 ASSESSMENT — PAIN DESCRIPTION - PROGRESSION: CLINICAL_PROGRESSION: NOT CHANGED

## 2024-11-26 ASSESSMENT — PAIN - FUNCTIONAL ASSESSMENT: PAIN_FUNCTIONAL_ASSESSMENT: 0-10

## 2024-11-26 ASSESSMENT — PAIN DESCRIPTION - PAIN TYPE: TYPE: CHRONIC PAIN

## 2024-11-26 ASSESSMENT — PAIN DESCRIPTION - LOCATION
LOCATION: LEG
LOCATION: KNEE

## 2024-11-26 NOTE — CARE PLAN
Problem: Skin  Goal: Decreased wound size/increased tissue granulation at next dressing change  Outcome: Progressing  Goal: Participates in plan/prevention/treatment measures  Outcome: Progressing  Goal: Prevent/manage excess moisture  Outcome: Progressing  Goal: Prevent/minimize sheer/friction injuries  Outcome: Progressing  Goal: Promote/optimize nutrition  Outcome: Progressing  Goal: Promote skin healing  Outcome: Progressing     Problem: Pain  Goal: Takes deep breaths with improved pain control throughout the shift  Outcome: Progressing  Goal: Turns in bed with improved pain control throughout the shift  Outcome: Progressing  Goal: Walks with improved pain control throughout the shift  Outcome: Progressing  Goal: Performs ADL's with improved pain control throughout shift  Outcome: Progressing  Goal: Participates in PT with improved pain control throughout the shift  Outcome: Progressing  Goal: Free from opioid side effects throughout the shift  Outcome: Progressing  Goal: Free from acute confusion related to pain meds throughout the shift  Outcome: Progressing   The patient's goals for the shift include      The clinical goals for the shift include pt will report a decrease in pain

## 2024-11-26 NOTE — SIGNIFICANT EVENT
RCRI    ELEVATED SURGERY RISK No  HX OF ISCHEMIC HEART DISEASE Yes  HX OF CONGESTIVE HEART FAILURE No  HX OF CVA No  PRE OP INSULIN No  PRE OP CREAT >2MG/DL No      1 POINTS CLASS II RISK  6.0% 30 DAY RISK OF DEATH, mi OR CARDIAC ARREST    DASI Score    Take care of self Yes  Walk indoors Yes  Walk 1-2 blocks on level ground Yes  Climb a flight of stairs/up a hill Yes  Run a short distance Yes  Do light work around the house Yes  Do moderate work around the house Yes  Do heavy work around the house Yes  Do yardwork Yes  Have sexual relations Yes  Participate in moderate rec activities Yes  Participate in strenous rec activities No    50.7 points    8.97 Mets    Last Echo 4/16/24     CONCLUSIONS:   1. Left ventricular ejection fraction is normal, by visual estimate at 60%.   2. There is normal right ventricular global systolic function.    Patient is surgically optimized   No Further Testing needed at this time    Please note patient stated ankle surgery in which he needed plates/screws in 2008- patient states he had a heart attack during this surgery--- patient states surgery was done at  but unable to find that documentation- as it might be on paper.       Ramonita Hsieh, APRN-CNP

## 2024-11-26 NOTE — H&P
HPI     Mr. yLnn is a 54-year-old male with PMHx: CAD, MI 2008, HTN, T2DM, gout, depression, GERD, arthritis, seizures who presented to the ED today with complaint of right knee pain and swelling.  States symptoms started on Friday denies any injury.  Thought it was his gout acting up at first but then it began to get worse states he was unable to walk because of the pain and swelling.  Was seen by Ortho down in the ED    While in the ED patient's vitals were stable and patient afebrile.  Labs were WNL with CRP being 4.53 and sed rate 23 no leukocytosis.  Right knee showed a small knee joint effusion and CXR was negative for acute cardiopulmonary process.  Patient to be admitted for right knee septic joint    ROS/EXAM     Review of Systems   Constitutional: Negative.    HENT: Negative.     Eyes: Negative.    Cardiovascular: Negative.    Gastrointestinal: Negative.    Genitourinary: Negative.    Musculoskeletal:  Positive for joint swelling and myalgias.   Skin: Negative.    Neurological: Negative.    All other systems reviewed and are negative.    Physical Exam  Vitals reviewed.   Constitutional:       Appearance: Normal appearance.   HENT:      Head: Normocephalic.      Mouth/Throat:      Mouth: Mucous membranes are dry.   Eyes:      Extraocular Movements: Extraocular movements intact.      Conjunctiva/sclera: Conjunctivae normal.      Pupils: Pupils are equal, round, and reactive to light.   Cardiovascular:      Rate and Rhythm: Normal rate and regular rhythm.      Pulses: Normal pulses.      Heart sounds: Normal heart sounds, S1 normal and S2 normal.   Pulmonary:      Effort: Pulmonary effort is normal.      Breath sounds:  "Normal breath sounds and air entry.   Abdominal:      General: Abdomen is flat. Bowel sounds are normal.      Palpations: Abdomen is soft.   Musculoskeletal:         General: Normal range of motion.      Cervical back: Full passive range of motion without pain and normal range of motion.   Skin:     General: Skin is warm and dry.   Neurological:      General: No focal deficit present.      Mental Status: He is alert and oriented to person, place, and time. Mental status is at baseline.   Psychiatric:         Attention and Perception: Attention normal.         Mood and Affect: Mood normal.         Speech: Speech normal.         Histories     Past Medical History:   Diagnosis Date   • Arthritis    • Depression    • Diabetes mellitus (Multi)    • GERD (gastroesophageal reflux disease)    • Heart disease    • Hypertension    • Myocardial infarction (Multi)    • Seizures (Multi)      Past Surgical History:   Procedure Laterality Date   • ANKLE SURGERY  03/24/2014    Ankle Surgery   • CT ANGIO CORONARY ART WITH HEARTFLOW IF SCORE >30%  3/14/2020    CT HEART CORONARY ANGIOGRAM 3/14/2020 [facility]   • OTHER SURGICAL HISTORY  04/25/2019    Ear pressure equalization tube insertion   • RECTAL POLYPECTOMY  06/30/2016    Rectal Surgery Polypectomy     Family History   Problem Relation Name Age of Onset   • Lung cancer Mother     • Heart attack Mother     • Coronary artery disease Mother     • Coronary artery disease Father        reports that he has never smoked. He has never used smokeless tobacco. He reports that he does not drink alcohol and does not use drugs.    Vitals/LABS/RESULTS     Last Recorded Vitals  Blood pressure (!) 154/99, pulse 84, temperature 37 °C (98.6 °F), temperature source Oral, resp. rate 16, height 1.803 m (5' 11\"), weight 91.2 kg (201 lb), SpO2 98%.  Intake/Output last 3 Shifts:  No intake/output data recorded.    Relevant Results  Lab Results   Component Value Date    WBC 7.2 11/26/2024    " HGB 15.0 11/26/2024    HCT 41.6 11/26/2024    MCV 84 11/26/2024     11/26/2024      Lab Results   Component Value Date    GLUCOSE 115 (H) 11/26/2024    CALCIUM 9.1 11/26/2024     11/26/2024    K 3.8 11/26/2024    CO2 23 11/26/2024     11/26/2024    BUN 13 11/26/2024    CREATININE 1.02 11/26/2024     XR chest 1 view    Result Date: 11/26/2024  Interpreted By:  Rico Bills, STUDY: XR CHEST 1 VIEW;  11/26/2024 9:51 am   INDICATION: Signs/Symptoms:pre op.     COMPARISON: 02/29/2024   ACCESSION NUMBER(S): [id number]   ORDERING CLINICIAN: TIAGO LOTT   FINDINGS: AP radiograph of the chest was provided.     CARDIOMEDIASTINAL SILHOUETTE: Cardiomediastinal silhouette is normal in size and configuration.   LUNGS: Lungs are clear.   ABDOMEN: No remarkable upper abdominal findings.   BONES: No acute osseous changes.       1.  No evidence of acute cardiopulmonary process.       MACRO: None   Signed by: Rico Bills 11/26/2024 10:04 AM Dictation workstation:   [id number]    Lower extremity venous duplex right    Result Date: 11/26/2024  Interpreted By:  Gonzalo Michelle, STUDY: VAS US LOWER EXTREMITY VENOUS DUPLEX RIGHT; 11/26/2024 5:41 am   INDICATION: Signs/Symptoms:Rule out DVT.   COMPARISON: None.   ACCESSION NUMBER(S): [id number]   ORDERING CLINICIAN: TIAGO LOTT   TECHNIQUE: Vascular ultrasound of the right lower extremity was performed. Real time compression views as well as Gray scale, color Doppler and spectral Doppler waveform analysis was performed.   FINDINGS: Evaluation of the visualized portions of the right common femoral vein, proximal, mid, and distal femoral vein, and popliteal vein were performed.  Evaluation of the visualized portions of the posterior tibial and peroneal veins were also performed.  In addition, evaluation of the contralateral common femoral vein was performed.   Limitations: None   The evaluated veins demonstrate normal compressibility. There is intact venous flow  demonstrating normal respiratory variability and normal augmentation of flow with calf compression. Therefore, there is no ultrasonographic evidence for deep vein thrombosis within the evaluated veins. No evidence of thrombus is seen within the contralateral common femoral vein.   There is a 6.8 x 1.3 x 3.6 cm fluid collection within the right popliteal fossa, consistent with a Baker's cyst.       No sonographic evidence for deep vein thrombosis within the evaluated veins of the right lower extremity.   Baker's cyst within the right popliteal fossa, as described above.   MACRO: None   Signed by: Gonzalo Michelle 11/26/2024 5:57 AM Dictation workstation:   [id number]    XR tibia fibula right 2 views    Result Date: 11/25/2024  Interpreted By:  Osei Peterson,  and Ogievich Taessa STUDY: XR KNEE RIGHT 3 VIEWS; XR TIBIA FIBULA RIGHT 2 VIEWS; ;  11/25/2024 5:38 am   INDICATION: Signs/Symptoms:Right knee pain/swelling. Per EMR: Right knee pain the past week, worse over the last couple of days.   COMPARISON: Right knee radiographs 07/13/2010   ACCESSION NUMBER(S): [id number]; [id number]   ORDERING CLINICIAN: HOMA VU   TECHNIQUE: Three views of the right knee. Two views of the right tibia/fibula.   FINDINGS: Bone: There is no acute fracture or dislocation. Joint: No significant joint space narrowing. Small suprapatellar joint effusion. Soft tissue: Nonspecific 1.8 x 0.4 cm ossified density in the upper superomedial calf.       1. No acute osseous abnormality of the right knee or tibia/fibula. 2. Small-sized knee joint effusion. 3. Heterotopic ossification in the calf that may be related to remote muscle strain or direct blunt trauma.   I personally reviewed the images/study and I agree with the findings as stated by Octaviano Santiago, , PGY-3. This study was interpreted at [facility], [place], [place].   MACRO: None   Signed by: Osei Peterson 11/25/2024 5:52 AM Dictation  workstation:   [id number]    XR knee right 3 views    Result Date: 11/25/2024  Interpreted By:  Osei Peterson  and Pamela Brumfield STUDY: XR KNEE RIGHT 3 VIEWS; XR TIBIA FIBULA RIGHT 2 VIEWS; ;  11/25/2024 5:38 am   INDICATION: Signs/Symptoms:Right knee pain/swelling. Per EMR: Right knee pain the past week, worse over the last couple of days.   COMPARISON: Right knee radiographs 07/13/2010   ACCESSION NUMBER(S): [id number]; [id number]   ORDERING CLINICIAN: HOMA VU   TECHNIQUE: Three views of the right knee. Two views of the right tibia/fibula.   FINDINGS: Bone: There is no acute fracture or dislocation. Joint: No significant joint space narrowing. Small suprapatellar joint effusion. Soft tissue: Nonspecific 1.8 x 0.4 cm ossified density in the upper superomedial calf.       1. No acute osseous abnormality of the right knee or tibia/fibula. 2. Small-sized knee joint effusion. 3. Heterotopic ossification in the calf that may be related to remote muscle strain or direct blunt trauma.   I personally reviewed the images/study and I agree with the findings as stated by Octaviano Santiago, , PGY-3. This study was interpreted at [facility], [place], [place].   MACRO: None   Signed by: Osei Peterson 11/25/2024 5:52 AM Dictation workstation:   [id number]    ECG 12 Lead    Result Date: 11/7/2024  Normal sinus rhythm Normal ECG When compared with ECG of 04-SEP-2023 12:04, No significant change since Confirmed by Frod Singh ([id number]) on 11/7/2024 10:40:04 PM      Medications     Scheduled medications  acetaminophen, 975 mg, oral, q6h  [START ON 11/27/2024] allopurinol, 400 mg, oral, Daily  amLODIPine, 5 mg, oral, Daily  atorvastatin, 40 mg, oral, Daily  fluticasone furoate-vilanteroL, 1 puff, inhalation, Daily  hydroCHLOROthiazide, 12.5 mg, oral, Daily  insulin lispro, 0-10 Units, subcutaneous, TID AC  pantoprazole, 40 mg, oral, Daily  sennosides-docusate sodium, 2 tablet,  oral, BID  traZODone, 100 mg, oral, Nightly      Continuous medications     PRN medications  PRN medications: cyclobenzaprine, dextrose, dextrose, glucagon, glucagon, ketorolac, oxyCODONE    PLAN     Mr. Lynn is a 54-year-old male with PMHx: CAD, MI 2008, HTN, T2DM, gout, depression, GERD, arthritis, seizures who presented to the ED today with complaint of right knee pain and swelling.  States symptoms started on Friday denies any injury.  Thought it was his gout acting up at first but then it began to get worse states he was unable to walk because of the pain and swelling.  Was seen by Ortho down in the ED for joint aspiration    While in the ED patient's vitals were stable and patient afebrile.  Labs were WNL with CRP being 4.53 and sed rate 23 no leukocytosis.  Right knee showed a small knee joint effusion and CXR was negative for acute cardiopulmonary process.  Patient to be admitted for right knee septic joint  Assessment/Plan:    Right knee septic joint  -Ortho consulted and appreciate recs  -Clear liquids after midnight and n.p.o. after 5 Am 11/27/24  -Pain: Tylenol scheduled as needed Toradol pain 4-10 and as needed Oxy for breakthrough  -Strict bedrest no weightbearing RLE    CAD-MI 2008  HTN  HLD  -Continue home amlodipine 5 mg daily  -Continue home atorvastatin 40 mg daily  -Continue hydrochlorothiazide 12.5 mg daily    T2DM  -Hold home Trulicity and metformin  -Moderate Humalog SSI 3 times daily and at bedtime while in the hospital  -A1c 5.2  11/26/24  Gout  -Continue home allopurinol 400 mg daily    COPD  -Continue home Symbicort as Breo Ellipta    GERD  -Continue home Protonix 40 mg daily    Depression  -Continue home trazodone 100 mg nightly      Fluids: monitor and replete as needed  Electrolytes: monitor and replete as needed  Nutrition: Regular diet   GI prophylaxis: Protonix 40mg QD  DVT prophylaxis: SCD  Code Status: Full code  PCP  Pharmacy    Disposition:  Admitted for above diagnoses. Plan  per above. Anticipate hospitalization >2 midnights.       Ramonita Hsieh, APRN-CNP         I spent 75 minutes in the professional and overall care on this encounter before, during and after the visit, examining the patient, reviewing labs, writing orders and documenting the note

## 2024-11-26 NOTE — ED TRIAGE NOTES
Pt BIB EMS from home. Pt was seen yesterday and given prescriptions. Pt states he was unable to  his prescriptions. Pt reports being unable to stand or walk and unable to sleep due to pain. Pt reports taking tylenol with no relief.

## 2024-11-26 NOTE — ED PROCEDURE NOTE
Procedure  Arthrocentesis    Performed by: Andrea Riggs MD  Authorized by: Lex Schuler MD    Consent:     Consent obtained:  Verbal and written    Consent given by:  Patient    Risks, benefits, and alternatives were discussed: yes      Risks discussed:  Bleeding, infection, pain, nerve damage and incomplete drainage    Alternatives discussed:  No treatment and delayed treatment  Universal protocol:     Procedure explained and questions answered to patient or proxy's satisfaction: yes      Immediately prior to procedure, a time out was called: yes      Patient identity confirmed:  Verbally with patient  Location:     Location:  Knee    Knee:  R knee  Anesthesia:     Anesthesia method:  None (Patient allergic to lidocaine)  Procedure details:     Preparation: Patient was prepped and draped in usual sterile fashion      Needle gauge:  22 G    Ultrasound guidance: yes      Approach:  Medial    Aspirate amount:  10cc    Aspirate characteristics:  Yellow    Steroid injected: no      Specimen collected: no    Post-procedure details:     Dressing:  Gauze roll    Procedure completion:  Tolerated               Andrea iRggs MD  Resident  11/26/24 [id number]

## 2024-11-26 NOTE — CONSULTS
Orthopaedic Surgery Consult H&P    HPI:   Orthopaedic Problems/Injuries: R knee SA  Other Injuries: None    54 y.o. male PMH HTN and DM2 presents after 4 days of R knee pain. Denies inciting injury.  Denies numbness, tingling, and open wounds on the affected limb. Ambulates w/o assistance at baseline, has been unable to ambulate now due to pain.    PMH: per above/EMR  PSH: per above/EMR  SocHx:      -  Denies tobacco use      -  Denies EtOH use      -  Denies other drug use  FamHx:  Non-contributory to this patient's acute orthopaedic problem.   Allergies: Reviewed in EMR  Meds: Reviewed in EMR    ROS      - 14 point ROS negative except as above    Physical Exam:  Gen: AOx3, NAD  HEENT: Normocephalic atraumatic  Psych: Appropriate mood and affect  Resp: Nonlabored breathing  Cardiac: Extremities WWP, RRR to peripheral palpation  Neuro: CN 2-12 grossly intact  Skin: No rashes    R Lower Extremity:  - Skin intact, moderate joint effusion  - Tender to palpation over R knee  - Pain w SA  - Fires EHL/DF/PF.  - Sensation intact to light touch in sural, saphenous, superficial/deep peroneal, tibial nerve distributions.  - 2+ DP pulse, < 2 seconds capillary refill.    A full secondary exam was performed and all relevant findings discussed and noted above.    Labs:  Results for orders placed or performed during the hospital encounter of 11/26/24 (from the past 24 hours)   Coagulation Screen   Result Value Ref Range    Protime 12.6 9.8 - 12.8 seconds    INR 1.1 0.9 - 1.1    aPTT 37 27 - 38 seconds   CBC and Auto Differential   Result Value Ref Range    WBC 7.2 4.4 - 11.3 x10*3/uL    nRBC 0.0 0.0 - 0.0 /100 WBCs    RBC 4.95 4.50 - 5.90 x10*6/uL    Hemoglobin 15.0 13.5 - 17.5 g/dL    Hematocrit 41.6 41.0 - 52.0 %    MCV 84 80 - 100 fL    MCH 30.3 26.0 - 34.0 pg    MCHC 36.1 (H) 32.0 - 36.0 g/dL    RDW 13.6 11.5 - 14.5 %    Platelets 219 150 - 450 x10*3/uL    Neutrophils % 70.1 40.0 - 80.0 %    Immature Granulocytes %, Automated  0.3 0.0 - 0.9 %    Lymphocytes % 19.4 13.0 - 44.0 %    Monocytes % 9.1 2.0 - 10.0 %    Eosinophils % 0.8 0.0 - 6.0 %    Basophils % 0.3 0.0 - 2.0 %    Neutrophils Absolute 5.03 1.20 - 7.70 x10*3/uL    Immature Granulocytes Absolute, Automated 0.02 0.00 - 0.70 x10*3/uL    Lymphocytes Absolute 1.39 1.20 - 4.80 x10*3/uL    Monocytes Absolute 0.65 0.10 - 1.00 x10*3/uL    Eosinophils Absolute 0.06 0.00 - 0.70 x10*3/uL    Basophils Absolute 0.02 0.00 - 0.10 x10*3/uL   Comprehensive metabolic panel   Result Value Ref Range    Glucose 115 (H) 74 - 99 mg/dL    Sodium 136 136 - 145 mmol/L    Potassium 3.8 3.5 - 5.3 mmol/L    Chloride 103 98 - 107 mmol/L    Bicarbonate 23 21 - 32 mmol/L    Anion Gap 14 10 - 20 mmol/L    Urea Nitrogen 13 6 - 23 mg/dL    Creatinine 1.02 0.50 - 1.30 mg/dL    eGFR 88 >60 mL/min/1.73m*2    Calcium 9.1 8.6 - 10.6 mg/dL    Albumin 4.4 3.4 - 5.0 g/dL    Alkaline Phosphatase 58 33 - 120 U/L    Total Protein 7.6 6.4 - 8.2 g/dL    AST 20 9 - 39 U/L    Bilirubin, Total 0.7 0.0 - 1.2 mg/dL    ALT 23 10 - 52 U/L   Sedimentation rate, automated   Result Value Ref Range    Sedimentation Rate 23 (H) 0 - 20 mm/h   C-reactive protein   Result Value Ref Range    C-Reactive Protein 4.53 (H) <1.00 mg/dL   Crystal Identification, Synovial Fluid   Result Value Ref Range    Crystal Identification, Synovial Fluid  No crystals seen by bright-field or first order red axis polarization microscopy.     No crystals seen by bright-field or first order red axis polarization microscopy.   Body Fluid Cell Count   Result Value Ref Range    Color, Fluid Yellow Colorless, Straw, Yellow    Clarity, Fluid Turbid (A) Clear    WBC, Fluid 44,290 See Comment /uL    RBC, Fluid 0 0  /uL /uL   Body Fluid Differential   Result Value Ref Range    Neutrophils %, Manual, Fluid 89 <25 % %    Lymphocytes %, Manual, Fluid 1 <75 % %    Mono/Macrophages %, Manual, Fluid 10 <70 % %    Eosinophils %, Manual, Fluid 0 0 % %    Basophils %, Manual,  Fluid 0 0 % %    Immature Granulocytes %, Manual, Fluid 0 0 % %    Blasts %, Manual, Fluid 0 0 % %    Unclassified Cells %, Manual, Fluid 0 0 % %    Plasma Cells %, Manual, Fluid 0 0 % %    Total Cells Counted, Fluid 100    Glucose, Fluid   Result Value Ref Range    Glucose, Fluid 84 Not established mg/dL   Protein, Total Fluid   Result Value Ref Range    Protein, Total Fluid 2.9 Not established g/dL     *Note: Due to a large number of results and/or encounters for the requested time period, some results have not been displayed. A complete set of results can be found in Results Review.       Imaging:  R Knee XR 11/25 w/o evidence of fx  XR chest 1 view   Final Result   1.  No evidence of acute cardiopulmonary process.                  MACRO:   None        Signed by: Rico Bills 11/26/2024 10:04 AM   Dictation workstation:   [id number]      Lower extremity venous duplex right   Final Result   No sonographic evidence for deep vein thrombosis within the evaluated   veins of the right lower extremity.        Bauman's cyst within the right popliteal fossa, as described above.        MACRO:   None        Signed by: Gonzalo Michelle 11/26/2024 5:57 AM   Dictation workstation:   [id number]      Point of Care Ultrasound    (Results Pending)              Assessment:  Orthopaedic Problems/Injuries: R Knee SA    54M (T2DM, HTN) p/w R knee pain x4days. On exam, effusion, pain w/ SA. ESR 23, CRP 4.53, WBC 7.2. Joint aspirate by ED turbid w 44k cells, 89% PMNs. Crystals negative    Plan:  -Hold antibiotics prior to surgery  - Clear liquids at midnight, NPO at 5am for upcoming surgery with orthopedics.  - Admit to medicine for infection, clearance pending for OR tomorrow; Appreciate documentation of clearance by primary team  - Please obtain pre-operative labs/studies: T&S, PT/INR, CBC, BMP, CXR, EKG  - Consented and posted to OR schedule for R knee I&D w/ orthopedic surgery on 11/27  - Strict Bedrest, NWB RL extremity.   -  Pre-operative ABx: NONE  - No indication for transfusion pre-operativel  - DVT PPx: SCDs, okay for chemoppx per primary    Sumanth Ceja MD  Orthopaedic Surgery PGY-1   On-call Resident  Pager: [phone]    This patient was seen within 30 minutes of initial consult.  _________________________________________________________    This patient will be followed by the Orthopaedic Trauma Team while inpatient. See team members and contacts below:     1 Kianna Dia MD  2 Sohail Tejada MD  3 Ramin Santana MD

## 2024-11-26 NOTE — ED PROVIDER NOTES
CC: Knee Pain     HPI:  Patient is a 54-year-old male with a past medical history of CAD, hypertension, T2DM, arthritis, and gout who presented to the ED for right knee pain.  Patient has noted persistence of his right knee pain over the past several days.  Denied any inciting event for his knee pain.  Notes that he was watching TV when it began.  Noted that he was seen in the ED yesterday where his workup was significant for a right knee x-ray with osteoarthritis and small effusion.  He noted that they offered him arthrocentesis yesterday and he declined.  Patient administered Tylenol.  Notes that he has not been able to take his gout medication because he ran out over the past couple days.  Notes that he typically gets gout in his feet.  Denied fevers, chills, nausea, vomiting, chest pain, difficulty breathing, headache, trauma, falls, abdominal pain, neck pain, back pain, dysuria, and abnormal bowel movements.    Limitations to history: None  Independent historian(s): Patient  Records Reviewed: Recent available ED and inpatient notes reviewed in EMR.    PMHx/PSHx:  Per HPI.   - has a past medical history of Cough, unspecified (03/22/2017), Myalgia, other site (12/19/2022), Other disorders of lung, Personal history of other diseases of the circulatory system, Personal history of other diseases of the circulatory system, Personal history of other diseases of the nervous system and sense organs, Personal history of other specified conditions, Personal history of other specified conditions, and Unspecified convulsions (Multi) (11/14/2022).  - has a past surgical history that includes Ankle surgery (03/24/2014); Other surgical history (04/25/2019); Rectal polypectomy (06/30/2016); and CT angio coronary art with heartflow if score >30% (3/14/2020).    Medications:  Reviewed in EMR. See EMR for complete list of medications and doses.    Allergies:  Aspirin, Topiramate, Salicylates, Penicillins, and Lidopatch  [lidocaine-menthol]    Social History:  - Tobacco:  reports that he has never smoked. He has never used smokeless tobacco.   - Alcohol:  reports no history of alcohol use.   - Illicit Drugs:  reports no history of drug use.     ROS:  Per HPI.       ???????????????????????????????????????????????????????????????  Triage Vitals:  T 37.3 °C (99.1 °F)  HR 87  BP (!) 161/100  RR 18  O2 97 %      Physical Exam  Vitals and nursing note reviewed.   Constitutional:       General: He is not in acute distress.  HENT:      Head: Normocephalic and atraumatic.      Nose: Nose normal.      Mouth/Throat:      Mouth: Mucous membranes are moist.   Eyes:      Conjunctiva/sclera: Conjunctivae normal.   Cardiovascular:      Rate and Rhythm: Normal rate and regular rhythm.      Pulses: Normal pulses.   Pulmonary:      Effort: Pulmonary effort is normal. No respiratory distress.      Breath sounds: Normal breath sounds.   Abdominal:      Palpations: Abdomen is soft.      Tenderness: There is no abdominal tenderness.   Musculoskeletal:      Comments: Edema and TTP of the right knee.  Passively able to range right knee.  RLE NVI.   Skin:     General: Skin is warm.   Neurological:      General: No focal deficit present.      Mental Status: He is alert.           ???????????????????????????????????????????????????????????????  Labs:   Labs Reviewed   CBC WITH AUTO DIFFERENTIAL - Abnormal       Result Value    WBC 7.2      nRBC 0.0      RBC 4.95      Hemoglobin 15.0      Hematocrit 41.6      MCV 84      MCH 30.3      MCHC 36.1 (*)     RDW 13.6      Platelets 219      Neutrophils % 70.1      Immature Granulocytes %, Automated 0.3      Lymphocytes % 19.4      Monocytes % 9.1      Eosinophils % 0.8      Basophils % 0.3      Neutrophils Absolute 5.03      Immature Granulocytes Absolute, Automated 0.02      Lymphocytes Absolute 1.39      Monocytes Absolute 0.65      Eosinophils Absolute 0.06      Basophils Absolute 0.02     COMPREHENSIVE  METABOLIC PANEL - Abnormal    Glucose 115 (*)     Sodium 136      Potassium 3.8      Chloride 103      Bicarbonate 23      Anion Gap 14      Urea Nitrogen 13      Creatinine 1.02      eGFR 88      Calcium 9.1      Albumin 4.4      Alkaline Phosphatase 58      Total Protein 7.6      AST 20      Bilirubin, Total 0.7      ALT 23     SEDIMENTATION RATE, AUTOMATED - Abnormal    Sedimentation Rate 23 (*)    C-REACTIVE PROTEIN - Abnormal    C-Reactive Protein 4.53 (*)    COAGULATION SCREEN - Normal    Protime 12.6      INR 1.1      aPTT 37      Narrative:     The APTT is no longer used for monitoring Unfractionated Heparin Therapy. For monitoring Heparin Therapy, use the Heparin Assay.        Imaging:   Lower extremity venous duplex right   Final Result   No sonographic evidence for deep vein thrombosis within the evaluated   veins of the right lower extremity.        Bauman's cyst within the right popliteal fossa, as described above.        MACRO:   None        Signed by: Gonzalo Michelle 11/26/2024 5:57 AM   Dictation workstation:   [id number]      Point of Care Ultrasound    (Results Pending)        MDM:  Patient is a 54-year-old male with a past medical history of CAD, hypertension, T2DM, arthritis, and gout who presented to the ED for right knee pain.  Patient presented HDS.  Physical exam finding significant for edema and TTP of the right knee.  POCUS significant for right knee effusion.  No significant cobblestoning noted.  Low clinical concern for sepsis, acute traumatic process, nerve injury, and cellulitis.  Please see ED course and disposition for remainder of care.    ED Course:  ED Course as of 11/28/24 0725   Tue Nov 26, 2024   0406 CBC and Auto Differential(!)  CBC without leukocytosis or anemia. [MH]   0437 Elevated ESR 23.  Elevated CRP of 4.53.  Metabolic panel was unremarkable. [MH]   0526 POCUS of the right knee significant for small effusion.  No significant cobblestoning noted. [MH]   1033 Body Fluid  Cell Count With Differential(!)  Clarity is turbid, white blood cells 44,290 and neutrophils were at 89% [HH]      ED Course User Index  [HH] LEONEL Rader  [] nAdrea Riggs MD         Diagnoses as of 11/28/24 0725   Pyogenic arthritis of right knee joint, due to unspecified organism (Multi)       Social Determinants Limiting Care:  None identified    Disposition:  Right lower extremity duplex did not display DVT.  Patient consented for arthrocentesis.  Arthrocentesis performed with yellow fluid obtained.  Patient signed out to LEONEL Watts in stable condition pending fluid studies.    Andrea Riggs MD   Emergency Medicine PGY-3  [facility]    Comment: Please note this report has been produced using speech recognition software and may contain errors related to that system including errors in grammar, punctuation, and spelling as well as words and phrases that may be inappropriate.  If there are any questions or concerns please feel free to contact the dictating provider for clarification.    Procedures ? SmartLinks last updated 11/26/2024 6:46 AM        Andrea Riggs MD  Resident  11/28/24 0732

## 2024-11-26 NOTE — PROGRESS NOTES
Pharmacy Medication History Review    Tip Lynn is a 54 y.o. male admitted for Pyogenic arthritis of right knee joint (Multi). Pharmacy reviewed the patient's prwyd-[id number]-qgrsfbdun medications for accuracy.        The list below reflects the updated PTA list.   Prior to Admission Medications   Prescriptions Last Dose Informant   Easy Touch Alcohol Prep Pads pads, medicated  Self   Sig: 3 times a day. Use as directed   acetaminophen (Tylenol) 500 mg tablet  Self   Sig: Take 2 tablets (1,000 mg) by mouth every 6 hours if needed for mild pain (1 - 3) for up to 10 days.   albuterol 90 mcg/actuation inhaler  Self   Sig: Inhale 1 puff every 6 hours if needed for wheezing or shortness of breath.   allopurinol (Zyloprim) 100 mg tablet  Self   Sig: TAKE 1 TABLET BY MOUTH ONCE DAILY *TOTAL DAILY DOSE IS 400MG IN COMBINATION WITH 300MG*   allopurinol (Zyloprim) 300 mg tablet  Self   Sig: Take 1 tablet (300 mg) by mouth once daily.   amLODIPine (Norvasc) 5 mg tablet  Self   Sig: Take 1 tablet (5 mg) by mouth once daily. As directed   atorvastatin (Lipitor) 40 mg tablet  Self   Sig: Take 1 tablet (40 mg) by mouth early in the morning..   budesonide-formoteroL (Symbicort) 80-4.5 mcg/actuation inhaler  Self   Sig: Inhale 2 puffs 2 times a day. Rinse mouth with water after use to reduce aftertaste and incidence of candidiasis. Do not swallow.   ciclopirox (Penlac) 8 % solution  Self   Sig: Apply topically 1 (one) time per week. GENTLY MASSAGE INTO L big toenail daily to top, under and around nail. wipe off with alcohol and trim.   cyclobenzaprine (Flexeril) 5 mg tablet  Self   Sig: Take 1 tablet (5 mg) by mouth 2 times a day as needed for muscle spasms.   dextromethorphan-guaifenesin (Mucinex DM)  mg 12 hr tablet  Self   Sig: Take 1 tablet by mouth every 12 hours if needed. Do not crush, chew, or split.   dulaglutide (Trulicity) 1.5 mg/0.5 mL pen injector injection  Self   Sig: Inject 1.5 mg under the skin 1 (one) time per  "week.   hydroCHLOROthiazide (Microzide) 12.5 mg tablet  Self   Sig: Take 1 tablet (12.5 mg) by mouth once daily.   melatonin 3 mg capsule  Self   Sig: Take 1 capsule by mouth as needed at bedtime.   meloxicam (Mobic) 15 mg tablet  Self   Sig: TAKE 1 TABLET BY MOUTH ONCE DAILY FOR 15 DAYS   Patient taking differently: Take 1 tablet (15 mg) by mouth once daily as needed.   metFORMIN (Glucophage) 500 mg tablet  Self   Sig: Take 1 tablet (500 mg) by mouth 2 times daily (morning and late afternoon).   pantoprazole (ProtoNix) 40 mg EC tablet  Self   Sig: Take 1 tablet (40 mg) by mouth once daily.   traZODone (Desyrel) 100 mg tablet  Self   Sig: Take 1 tablet (100 mg) by mouth once daily at bedtime.      Facility-Administered Medications: None       Patient accepts M2B at discharge.     Sources:   [facility]  Pharmacy dispense history  Patient interview Moderate historian  Chart Review  Care Everywhere  7/31/24 office visit-  Descovy prescribe for PreP.  Last filled at pharmacy 11/16/24 #30.  Patient reports on 11/26/24 no longer taking and asked for it to be removed from his home med list  Additional Comments:  Patient was moderate historian, reliably recognized and confirmed home meds when name, dosage, and indication read off to him  Descovy 200-25mg 1 tablet daily last filled on 11/16/24.  Prescribed 7/31/24 office visit for Prep.  Patient states he is no longer taking and wants it removed from home med list.      Michael Mario, PharmD  Transitions of Care Pharmacist  11/26/24     Secure Chat preferred   If no response call [phone] or Vocera \"Med Rec\"    "

## 2024-11-26 NOTE — PROGRESS NOTES
Emergency Medicine Transition of Care Note.    I received Tip Lynn in signout from Dr. Dr. Riggs.  Please see the previous ED provider note for all HPI, PE and MDM up to the time of signout at 0700. This is in addition to the primary record.    In brief Tip Lynn is an 54 y.o. male presenting for   Chief Complaint   Patient presents with    Knee Pain     At the time of signout we were awaiting: results of the knee joint aspiration    ED Course as of 11/26/24 1034   Tue Nov 26, 2024   0406 CBC and Auto Differential(!)  CBC without leukocytosis or anemia. [MH]   0437 Elevated ESR 23.  Elevated CRP of 4.53.  Metabolic panel was unremarkable. [MH]   0526 POCUS of the right knee significant for small effusion.  No significant cobblestoning noted. []   1033 Body Fluid Cell Count With Differential(!)  Clarity is turbid, white blood cells 44,290 and neutrophils were at 89% [HH]      ED Course User Index  [HH] Jocelyn Cleveland, APRN-CNP  [] Andrea Riggs MD         Diagnoses as of 11/26/24 1034   Pyogenic arthritis of right knee joint, due to unspecified organism (Multi)   XR chest 1 view    Result Date: 11/26/2024  Interpreted By:  Rico Bills, STUDY: XR CHEST 1 VIEW;  11/26/2024 9:51 am   INDICATION: Signs/Symptoms:pre op.     COMPARISON: 02/29/2024   ACCESSION NUMBER(S): [id number]   ORDERING CLINICIAN: TIAGO LOTT   FINDINGS: AP radiograph of the chest was provided.     CARDIOMEDIASTINAL SILHOUETTE: Cardiomediastinal silhouette is normal in size and configuration.   LUNGS: Lungs are clear.   ABDOMEN: No remarkable upper abdominal findings.   BONES: No acute osseous changes.       1.  No evidence of acute cardiopulmonary process.       MACRO: None   Signed by: Rico Bills 11/26/2024 10:04 AM Dictation workstation:   [id number]    Lower extremity venous duplex right    Result Date: 11/26/2024  Interpreted By:  Gonzalo Michelle, STUDY: [facility] LOWER EXTREMITY VENOUS DUPLEX RIGHT; 11/26/2024 5:41 am    INDICATION: Signs/Symptoms:Rule out DVT.   COMPARISON: None.   ACCESSION NUMBER(S): [id number]   ORDERING CLINICIAN: TIAGO LOTT   TECHNIQUE: Vascular ultrasound of the right lower extremity was performed. Real time compression views as well as Gray scale, color Doppler and spectral Doppler waveform analysis was performed.   FINDINGS: Evaluation of the visualized portions of the right common femoral vein, proximal, mid, and distal femoral vein, and popliteal vein were performed.  Evaluation of the visualized portions of the posterior tibial and peroneal veins were also performed.  In addition, evaluation of the contralateral common femoral vein was performed.   Limitations: None   The evaluated veins demonstrate normal compressibility. There is intact venous flow demonstrating normal respiratory variability and normal augmentation of flow with calf compression. Therefore, there is no ultrasonographic evidence for deep vein thrombosis within the evaluated veins. No evidence of thrombus is seen within the contralateral common femoral vein.   There is a 6.8 x 1.3 x 3.6 cm fluid collection within the right popliteal fossa, consistent with a Baker's cyst.       No sonographic evidence for deep vein thrombosis within the evaluated veins of the right lower extremity.   Baker's cyst within the right popliteal fossa, as described above.   MACRO: None   Signed by: Gonzalo Michelle 11/26/2024 5:57 AM Dictation workstation:   [id number]    XR tibia fibula right 2 views    Result Date: 11/25/2024  Interpreted By:  Osei Peterson and Ogievich Taessa STUDY: XR KNEE RIGHT 3 VIEWS; XR TIBIA FIBULA RIGHT 2 VIEWS; ;  11/25/2024 5:38 am   INDICATION: Signs/Symptoms:Right knee pain/swelling. Per EMR: Right knee pain the past week, worse over the last couple of days.   COMPARISON: Right knee radiographs 07/13/2010   ACCESSION NUMBER(S): [id number]; [id number]   ORDERING CLINICIAN: HOMA VU   TECHNIQUE: Three views of the  right knee. Two views of the right tibia/fibula.   FINDINGS: Bone: There is no acute fracture or dislocation. Joint: No significant joint space narrowing. Small suprapatellar joint effusion. Soft tissue: Nonspecific 1.8 x 0.4 cm ossified density in the upper superomedial calf.       1. No acute osseous abnormality of the right knee or tibia/fibula. 2. Small-sized knee joint effusion. 3. Heterotopic ossification in the calf that may be related to remote muscle strain or direct blunt trauma.   I personally reviewed the images/study and I agree with the findings as stated by Octaviano Santiago DO, PGY-3. This study was interpreted at [place], [place].   MACRO: None   Signed by: Osei Peterson 11/25/2024 5:52 AM Dictation workstation:   [id number]    XR knee right 3 views    Result Date: 11/25/2024  Interpreted By:  Osei Peterson and Ogievich Taessa STUDY: XR KNEE RIGHT 3 VIEWS; XR TIBIA FIBULA RIGHT 2 VIEWS; ;  11/25/2024 5:38 am   INDICATION: Signs/Symptoms:Right knee pain/swelling. Per EMR: Right knee pain the past week, worse over the last couple of days.   COMPARISON: Right knee radiographs 07/13/2010   ACCESSION NUMBER(S): [id number]; [id number]   ORDERING CLINICIAN: HOMA VU   TECHNIQUE: Three views of the right knee. Two views of the right tibia/fibula.   FINDINGS: Bone: There is no acute fracture or dislocation. Joint: No significant joint space narrowing. Small suprapatellar joint effusion. Soft tissue: Nonspecific 1.8 x 0.4 cm ossified density in the upper superomedial calf.       1. No acute osseous abnormality of the right knee or tibia/fibula. 2. Small-sized knee joint effusion. 3. Heterotopic ossification in the calf that may be related to remote muscle strain or direct blunt trauma.   I personally reviewed the images/study and I agree with the findings as stated by Octaviano Santiago DO, PGY-3. This study was interpreted at [place], [place].   MACRO: None   Signed by: Osei Peterson 11/25/2024 5:52 AM Dictation workstation:   [id number]    ECG 12 Lead    Result Date: 11/7/2024  Normal sinus rhythm Normal ECG When compared with ECG of 04-SEP-2023 12:04, No significant change since Confirmed by Ford Singh ([id number]) on 11/7/2024 10:40:04 PM      Medical Decision Making  Patient remained stable in the emergency room.  Knee aspiration was performed by previous ED team.  Results were reviewed.  Joint aspiration showed turbid fluid with over 44,000 white cells and 89% neutrophils.  Ortho team evaluated the patient, please see orthopedics note for complete details.  Ortho team recommended admission to medicine with a plan to go to the operating room tomorrow.  Patient was admitted to the medicine team for further evaluation and management.    Final diagnoses:   [M00.9] Pyogenic arthritis of right knee joint, due to unspecified organism (Multi)           Procedure  Procedures    Jocelyn Cleveland, APRN-CNP

## 2024-11-27 ENCOUNTER — ANESTHESIA EVENT (OUTPATIENT)
Dept: OPERATING ROOM | Facility: HOSPITAL | Age: 54
End: 2024-11-27
Payer: MEDICARE

## 2024-11-27 ENCOUNTER — DOCUMENTATION (OUTPATIENT)
Dept: CASE MANAGEMENT | Facility: HOSPITAL | Age: 54
End: 2024-11-27
Payer: MEDICARE

## 2024-11-27 ENCOUNTER — APPOINTMENT (OUTPATIENT)
Dept: CARDIOLOGY | Facility: HOSPITAL | Age: 54
DRG: 489 | End: 2024-11-27
Payer: MEDICARE

## 2024-11-27 ENCOUNTER — ANESTHESIA (OUTPATIENT)
Dept: OPERATING ROOM | Facility: HOSPITAL | Age: 54
End: 2024-11-27
Payer: MEDICARE

## 2024-11-27 LAB
GLUCOSE BLD MANUAL STRIP-MCNC: 107 MG/DL (ref 74–99)
GLUCOSE BLD MANUAL STRIP-MCNC: 118 MG/DL (ref 74–99)
GLUCOSE BLD MANUAL STRIP-MCNC: 323 MG/DL (ref 74–99)
GLUCOSE BLD MANUAL STRIP-MCNC: 369 MG/DL (ref 74–99)
GLUCOSE BLD MANUAL STRIP-MCNC: 84 MG/DL (ref 74–99)
GLUCOSE BLD MANUAL STRIP-MCNC: 90 MG/DL (ref 74–99)
GLUCOSE BLD MANUAL STRIP-MCNC: 96 MG/DL (ref 74–99)
GLUCOSE BLD MANUAL STRIP-MCNC: 99 MG/DL (ref 74–99)

## 2024-11-27 PROCEDURE — 0QDG0ZZ EXTRACTION OF RIGHT TIBIA, OPEN APPROACH: ICD-10-PCS | Performed by: ORTHOPAEDIC SURGERY

## 2024-11-27 PROCEDURE — 82947 ASSAY GLUCOSE BLOOD QUANT: CPT

## 2024-11-27 PROCEDURE — 2500000001 HC RX 250 WO HCPCS SELF ADMINISTERED DRUGS (ALT 637 FOR MEDICARE OP): Performed by: STUDENT IN AN ORGANIZED HEALTH CARE EDUCATION/TRAINING PROGRAM

## 2024-11-27 PROCEDURE — 0QDD0ZZ EXTRACTION OF RIGHT PATELLA, OPEN APPROACH: ICD-10-PCS | Performed by: ORTHOPAEDIC SURGERY

## 2024-11-27 PROCEDURE — 99233 SBSQ HOSP IP/OBS HIGH 50: CPT | Performed by: STUDENT IN AN ORGANIZED HEALTH CARE EDUCATION/TRAINING PROGRAM

## 2024-11-27 PROCEDURE — 2500000004 HC RX 250 GENERAL PHARMACY W/ HCPCS (ALT 636 FOR OP/ED)

## 2024-11-27 PROCEDURE — 2500000001 HC RX 250 WO HCPCS SELF ADMINISTERED DRUGS (ALT 637 FOR MEDICARE OP): Performed by: NURSE PRACTITIONER

## 2024-11-27 PROCEDURE — 2500000004 HC RX 250 GENERAL PHARMACY W/ HCPCS (ALT 636 FOR OP/ED): Performed by: STUDENT IN AN ORGANIZED HEALTH CARE EDUCATION/TRAINING PROGRAM

## 2024-11-27 PROCEDURE — 2500000004 HC RX 250 GENERAL PHARMACY W/ HCPCS (ALT 636 FOR OP/ED): Performed by: ANESTHESIOLOGY

## 2024-11-27 PROCEDURE — 3700000002 HC GENERAL ANESTHESIA TIME - EACH INCREMENTAL 1 MINUTE: Performed by: ORTHOPAEDIC SURGERY

## 2024-11-27 PROCEDURE — 3600000007 HC OR TIME - EACH INCREMENTAL 1 MINUTE - PROCEDURE LEVEL TWO: Performed by: ORTHOPAEDIC SURGERY

## 2024-11-27 PROCEDURE — 0QDB0ZZ EXTRACTION OF RIGHT LOWER FEMUR, OPEN APPROACH: ICD-10-PCS | Performed by: ORTHOPAEDIC SURGERY

## 2024-11-27 PROCEDURE — 27310 EXPLORATION OF KNEE JOINT: CPT | Performed by: ORTHOPAEDIC SURGERY

## 2024-11-27 PROCEDURE — 1100000001 HC PRIVATE ROOM DAILY

## 2024-11-27 PROCEDURE — 2500000005 HC RX 250 GENERAL PHARMACY W/O HCPCS: Performed by: ORTHOPAEDIC SURGERY

## 2024-11-27 PROCEDURE — 3600000002 HC OR TIME - INITIAL BASE CHARGE - PROCEDURE LEVEL TWO: Performed by: ORTHOPAEDIC SURGERY

## 2024-11-27 PROCEDURE — 93010 ELECTROCARDIOGRAM REPORT: CPT | Performed by: INTERNAL MEDICINE

## 2024-11-27 PROCEDURE — 0S9C0ZZ DRAINAGE OF RIGHT KNEE JOINT, OPEN APPROACH: ICD-10-PCS | Performed by: ORTHOPAEDIC SURGERY

## 2024-11-27 PROCEDURE — 7100000001 HC RECOVERY ROOM TIME - INITIAL BASE CHARGE: Performed by: ORTHOPAEDIC SURGERY

## 2024-11-27 PROCEDURE — 93005 ELECTROCARDIOGRAM TRACING: CPT

## 2024-11-27 PROCEDURE — 2720000007 HC OR 272 NO HCPCS: Performed by: ORTHOPAEDIC SURGERY

## 2024-11-27 PROCEDURE — 87070 CULTURE OTHR SPECIMN AEROBIC: CPT | Performed by: ORTHOPAEDIC SURGERY

## 2024-11-27 PROCEDURE — 7100000002 HC RECOVERY ROOM TIME - EACH INCREMENTAL 1 MINUTE: Performed by: ORTHOPAEDIC SURGERY

## 2024-11-27 PROCEDURE — 3700000001 HC GENERAL ANESTHESIA TIME - INITIAL BASE CHARGE: Performed by: ORTHOPAEDIC SURGERY

## 2024-11-27 RX ORDER — SODIUM CHLORIDE, SODIUM LACTATE, POTASSIUM CHLORIDE, CALCIUM CHLORIDE 600; 310; 30; 20 MG/100ML; MG/100ML; MG/100ML; MG/100ML
100 INJECTION, SOLUTION INTRAVENOUS CONTINUOUS
Status: DISCONTINUED | OUTPATIENT
Start: 2024-11-27 | End: 2024-11-27 | Stop reason: HOSPADM

## 2024-11-27 RX ORDER — LIDOCAINE HYDROCHLORIDE 20 MG/ML
INJECTION, SOLUTION INFILTRATION; PERINEURAL AS NEEDED
Status: DISCONTINUED | OUTPATIENT
Start: 2024-11-27 | End: 2024-11-27

## 2024-11-27 RX ORDER — HYDROXYZINE HYDROCHLORIDE 25 MG/1
25 TABLET, FILM COATED ORAL EVERY 6 HOURS PRN
Status: DISCONTINUED | OUTPATIENT
Start: 2024-11-27 | End: 2024-12-02 | Stop reason: HOSPADM

## 2024-11-27 RX ORDER — LIDOCAINE HYDROCHLORIDE 10 MG/ML
0.1 INJECTION, SOLUTION INFILTRATION; PERINEURAL ONCE
Status: DISCONTINUED | OUTPATIENT
Start: 2024-11-27 | End: 2024-11-27 | Stop reason: HOSPADM

## 2024-11-27 RX ORDER — ROCURONIUM BROMIDE 10 MG/ML
INJECTION, SOLUTION INTRAVENOUS AS NEEDED
Status: DISCONTINUED | OUTPATIENT
Start: 2024-11-27 | End: 2024-11-27

## 2024-11-27 RX ORDER — ONDANSETRON HYDROCHLORIDE 2 MG/ML
4 INJECTION, SOLUTION INTRAVENOUS ONCE
Status: COMPLETED | OUTPATIENT
Start: 2024-11-27 | End: 2024-11-27

## 2024-11-27 RX ORDER — METOPROLOL SUCCINATE 50 MG/1
50 TABLET, EXTENDED RELEASE ORAL ONCE
Status: DISCONTINUED | OUTPATIENT
Start: 2024-11-27 | End: 2024-11-27

## 2024-11-27 RX ORDER — OXYCODONE HYDROCHLORIDE 5 MG/1
5 TABLET ORAL EVERY 4 HOURS PRN
Status: DISCONTINUED | OUTPATIENT
Start: 2024-11-27 | End: 2024-11-29

## 2024-11-27 RX ORDER — ACETAMINOPHEN 325 MG/1
650 TABLET ORAL ONCE
Status: DISCONTINUED | OUTPATIENT
Start: 2024-11-27 | End: 2024-11-27 | Stop reason: HOSPADM

## 2024-11-27 RX ORDER — MIDAZOLAM HYDROCHLORIDE 1 MG/ML
INJECTION INTRAMUSCULAR; INTRAVENOUS AS NEEDED
Status: DISCONTINUED | OUTPATIENT
Start: 2024-11-27 | End: 2024-11-27

## 2024-11-27 RX ORDER — HYDROMORPHONE HYDROCHLORIDE 1 MG/ML
0.2 INJECTION, SOLUTION INTRAMUSCULAR; INTRAVENOUS; SUBCUTANEOUS EVERY 5 MIN PRN
Status: DISCONTINUED | OUTPATIENT
Start: 2024-11-27 | End: 2024-11-27 | Stop reason: HOSPADM

## 2024-11-27 RX ORDER — AMLODIPINE BESYLATE 10 MG/1
10 TABLET ORAL DAILY
Status: DISCONTINUED | OUTPATIENT
Start: 2024-11-28 | End: 2024-12-02 | Stop reason: HOSPADM

## 2024-11-27 RX ORDER — FENTANYL CITRATE 50 UG/ML
INJECTION, SOLUTION INTRAMUSCULAR; INTRAVENOUS AS NEEDED
Status: DISCONTINUED | OUTPATIENT
Start: 2024-11-27 | End: 2024-11-27

## 2024-11-27 RX ORDER — CEFAZOLIN 1 G/1
INJECTION, POWDER, FOR SOLUTION INTRAVENOUS AS NEEDED
Status: DISCONTINUED | OUTPATIENT
Start: 2024-11-27 | End: 2024-11-27

## 2024-11-27 RX ORDER — ENOXAPARIN SODIUM 100 MG/ML
40 INJECTION SUBCUTANEOUS DAILY
Status: DISCONTINUED | OUTPATIENT
Start: 2024-11-28 | End: 2024-12-02 | Stop reason: HOSPADM

## 2024-11-27 RX ORDER — OXYCODONE HYDROCHLORIDE 5 MG/1
5 TABLET ORAL ONCE
Status: DISCONTINUED | OUTPATIENT
Start: 2024-11-27 | End: 2024-11-27 | Stop reason: HOSPADM

## 2024-11-27 RX ORDER — ONDANSETRON HYDROCHLORIDE 2 MG/ML
INJECTION, SOLUTION INTRAVENOUS AS NEEDED
Status: DISCONTINUED | OUTPATIENT
Start: 2024-11-27 | End: 2024-11-27

## 2024-11-27 RX ORDER — METOPROLOL TARTRATE 50 MG/1
50 TABLET ORAL ONCE
Status: COMPLETED | OUTPATIENT
Start: 2024-11-27 | End: 2024-11-27

## 2024-11-27 RX ORDER — ISOSORBIDE MONONITRATE 30 MG/1
30 TABLET, EXTENDED RELEASE ORAL DAILY
Status: DISCONTINUED | OUTPATIENT
Start: 2024-11-27 | End: 2024-11-29

## 2024-11-27 RX ORDER — SODIUM CHLORIDE 0.9 G/100ML
IRRIGANT IRRIGATION AS NEEDED
Status: DISCONTINUED | OUTPATIENT
Start: 2024-11-27 | End: 2024-11-27 | Stop reason: HOSPADM

## 2024-11-27 RX ORDER — HYDROMORPHONE HYDROCHLORIDE 1 MG/ML
INJECTION, SOLUTION INTRAMUSCULAR; INTRAVENOUS; SUBCUTANEOUS AS NEEDED
Status: DISCONTINUED | OUTPATIENT
Start: 2024-11-27 | End: 2024-11-27

## 2024-11-27 RX ORDER — PROPOFOL 10 MG/ML
INJECTION, EMULSION INTRAVENOUS AS NEEDED
Status: DISCONTINUED | OUTPATIENT
Start: 2024-11-27 | End: 2024-11-27

## 2024-11-27 RX ORDER — SODIUM CHLORIDE, SODIUM LACTATE, POTASSIUM CHLORIDE, CALCIUM CHLORIDE 600; 310; 30; 20 MG/100ML; MG/100ML; MG/100ML; MG/100ML
INJECTION, SOLUTION INTRAVENOUS CONTINUOUS PRN
Status: DISCONTINUED | OUTPATIENT
Start: 2024-11-27 | End: 2024-11-27

## 2024-11-27 RX ORDER — LABETALOL HYDROCHLORIDE 5 MG/ML
5 INJECTION, SOLUTION INTRAVENOUS
Status: DISCONTINUED | OUTPATIENT
Start: 2024-11-27 | End: 2024-11-27 | Stop reason: HOSPADM

## 2024-11-27 RX ORDER — HYDROMORPHONE HYDROCHLORIDE 1 MG/ML
0.5 INJECTION, SOLUTION INTRAMUSCULAR; INTRAVENOUS; SUBCUTANEOUS EVERY 5 MIN PRN
Status: DISCONTINUED | OUTPATIENT
Start: 2024-11-27 | End: 2024-11-27 | Stop reason: HOSPADM

## 2024-11-27 SDOH — HEALTH STABILITY: MENTAL HEALTH: CURRENT SMOKER: 0

## 2024-11-27 ASSESSMENT — PAIN SCALES - GENERAL
PAINLEVEL_OUTOF10: 9
PAINLEVEL_OUTOF10: 9
PAINLEVEL_OUTOF10: 10 - WORST POSSIBLE PAIN
PAIN_LEVEL: 7
PAINLEVEL_OUTOF10: 10 - WORST POSSIBLE PAIN
PAINLEVEL_OUTOF10: 10 - WORST POSSIBLE PAIN
PAINLEVEL_OUTOF10: 9
PAINLEVEL_OUTOF10: 8
PAINLEVEL_OUTOF10: 10 - WORST POSSIBLE PAIN
PAINLEVEL_OUTOF10: 8
PAINLEVEL_OUTOF10: 10 - WORST POSSIBLE PAIN
PAINLEVEL_OUTOF10: 0 - NO PAIN
PAINLEVEL_OUTOF10: 9

## 2024-11-27 ASSESSMENT — PAIN DESCRIPTION - ORIENTATION
ORIENTATION: RIGHT
ORIENTATION: RIGHT

## 2024-11-27 ASSESSMENT — COLUMBIA-SUICIDE SEVERITY RATING SCALE - C-SSRS
6. HAVE YOU EVER DONE ANYTHING, STARTED TO DO ANYTHING, OR PREPARED TO DO ANYTHING TO END YOUR LIFE?: NO
1. IN THE PAST MONTH, HAVE YOU WISHED YOU WERE DEAD OR WISHED YOU COULD GO TO SLEEP AND NOT WAKE UP?: NO
2. HAVE YOU ACTUALLY HAD ANY THOUGHTS OF KILLING YOURSELF?: NO

## 2024-11-27 ASSESSMENT — PAIN DESCRIPTION - LOCATION
LOCATION: KNEE

## 2024-11-27 ASSESSMENT — COGNITIVE AND FUNCTIONAL STATUS - GENERAL
MOBILITY SCORE: 18
HELP NEEDED FOR BATHING: A LOT
MOVING TO AND FROM BED TO CHAIR: A LITTLE
CLIMB 3 TO 5 STEPS WITH RAILING: A LOT
TOILETING: A LOT
WALKING IN HOSPITAL ROOM: A LOT
STANDING UP FROM CHAIR USING ARMS: A LITTLE
DAILY ACTIVITIY SCORE: 15
DRESSING REGULAR LOWER BODY CLOTHING: A LOT
DRESSING REGULAR UPPER BODY CLOTHING: A LOT
PERSONAL GROOMING: A LITTLE

## 2024-11-27 ASSESSMENT — PAIN - FUNCTIONAL ASSESSMENT
PAIN_FUNCTIONAL_ASSESSMENT: 0-10
PAIN_FUNCTIONAL_ASSESSMENT: UNABLE TO SELF-REPORT
PAIN_FUNCTIONAL_ASSESSMENT: 0-10
PAIN_FUNCTIONAL_ASSESSMENT: UNABLE TO SELF-REPORT
PAIN_FUNCTIONAL_ASSESSMENT: 0-10

## 2024-11-27 ASSESSMENT — ACTIVITIES OF DAILY LIVING (ADL): LACK_OF_TRANSPORTATION: YES

## 2024-11-27 NOTE — OP NOTE
Debridement Knee (R) Operative Note     Date: 2024 - 2024  OR Location: [facility] OR    Name: Tip Lynn, : 1970, Age: 54 y.o., MRN: [id number], Sex: male    Diagnosis  Right knee septic arthritis    Procedures  Right knee open irrigation and debridement    Surgeons      * Eric Pittman - Primary    Resident/Fellow/Other Assistant:  Surgeons and Role:     * Canelo Muir MD - Resident - Assisting  Kianna Dia MD - Resident    Staff:   Taylorub Person: Shantell  Circulator: Molly  Scrub Person: Star  Circulator: Marietta Tapia Circulator: Yanely    Anesthesia Staff: Anesthesiologist: Carleen Nice MD; Siddharth Lynn DO  C-AA: RAMIN Cordero    Procedure Summary  Anesthesia: General  ASA: III  Estimated Blood Loss: 5 mL  Intra-op Medications:   Administrations occurring from 1248 to 1438 on 24:   Medication Name Total Dose   acetaminophen (Tylenol) tablet 975 mg Cannot be calculated   allopurinol (Zyloprim) tablet 400 mg Cannot be calculated   amLODIPine (Norvasc) tablet 5 mg Cannot be calculated   atorvastatin (Lipitor) tablet 40 mg Cannot be calculated   ceFAZolin (Ancef) vial 1 g 2 g   cyclobenzaprine (Flexeril) tablet 5 mg Cannot be calculated   dexAMETHasone (Decadron) 4 mg/mL 10 mg   dextrose 50 % injection 12.5 g Cannot be calculated   dextrose 50 % injection 25 g Cannot be calculated   fentaNYL (Sublimaze) injection 50 mcg/mL 100 mcg   flu vaccine trivalent (PF) (Fluarix/Fluzone/Flulaval) 6 months or greater injection Cannot be calculated   fluticasone furoate-vilanteroL (Breo Ellipta) 100-25 mcg/dose inhaler 1 puff Cannot be calculated   glucagon (Glucagen) injection 1 mg Cannot be calculated   glucagon (Glucagen) injection 1 mg Cannot be calculated   hydroCHLOROthiazide (HYDRODiuril) tablet 12.5 mg Cannot be calculated   HYDROmorphone (Dilaudid) injection 1 mg/mL 0.4 mg   hydrOXYzine HCL (Atarax) tablet 25 mg Cannot be calculated   insulin lispro injection 0-10 Units  Cannot be calculated   ketorolac (Toradol) injection 15 mg Cannot be calculated   LR infusion 47.5 mL   lidocaine (Xylocaine) injection 2 % 100 mg   midazolam PF (Versed) injection 1 mg/mL 2 mg   oxyCODONE (Roxicodone) immediate release tablet 5 mg Cannot be calculated   pantoprazole (ProtoNix) EC tablet 40 mg Cannot be calculated   propofol (Diprivan) injection 10 mg/mL 150 mg   rocuronium (ZeMuron) 50 mg/5 mL injection 70 mg   sennosides-docusate sodium (Charlene-Colace) 8.6-50 mg per tablet 2 tablet Cannot be calculated   traZODone (Desyrel) tablet 100 mg Cannot be calculated              Anesthesia Record               Intraprocedure I/O Totals       None           Specimen:   ID Type Source Tests Collected by Time   A : right knee 1 Swab SOFT TISSUE RESECTION TISSUE/WOUND CULTURE/SMEAR Eric Pittman MD 11/27/2024 1433   B : right knee 3 Swab SOFT TISSUE RESECTION TISSUE/WOUND CULTURE/SMEAR Eric Pittman MD 11/27/2024 1433   C : right knee 2 Swab SOFT TISSUE RESECTION TISSUE/WOUND CULTURE/SMEAR Eric Pittman MD 11/27/2024 1433                 Drains and/or Catheters:   Closed/Suction Drain Right Knee Accordion 10 Fr. (Active)       Tourniquet Times:     Total Tourniquet Time Documented:  Leg (laterality) - 40 minutes  Total: Leg (laterality) - 40 minutes      Implants: N/A    Findings: Cloudy yellow synovial fluid    Indications: Tip Lynn is an 54 y.o. male who is having surgery for Pyogenic arthritis of right knee joint, due to unspecified organism (Multi) [M00.9]. The patient presented to [facility] Emergency Department on 11/26/24 with a 4 day history of right knee pain, effusion, and inability to bear weight. Inflammatory markers were elevated with ESR 23, CRP 4.53, and normal WBC of 7.2. Knee aspiration was performed and aspirate was found to be turbid with 44,290 WBCs and 89% neutrophils. Due to concern for possible septic arthritis despite gram stain without organisms, decision was made to proceed  with surgery in the form of irrigation and debridement prior to culture finalization.    The patient was seen in the preoperative area. The risks, benefits, complications, treatment options, non-operative alternatives, expected recovery and outcomes were discussed with the patient. The possibilities of reaction to medication, pulmonary aspiration, injury to surrounding structures, bleeding, recurrent infection, the need for additional procedures, failure to diagnose a condition, and creating a complication requiring transfusion or operation were discussed with the patient. The patient concurred with the proposed plan, giving informed consent.  The site of surgery was properly noted/marked if necessary per policy. The patient has been actively warmed in preoperative area. Preoperative Ancef was given immediately prior to incision. Venous thrombosis prophylaxis have been ordered including unilateral sequential compression device    Procedure Details:   The patient was transferred to the operating room suite. A time-out was performed to confirm the patient's identity and the correct laterality of surgery.  General anesthesia was administered without complication. He was mobilized from the [facility] to the operating room table. A tourniquet was placed high on the right thigh. The patient was then prepped and draped in the usual sterile fashion. Ancef was administered immediately prior to incision.    We made a longitudinal incision over the anterior knee. Flaps were elevated medially and laterally. Using a new #10 blade, we made a medial parapatellar approach. The distal aspect of the quadriceps tendon was incised along with the retinaculum and joint capsule medial to the patella. The inferior border of the patella was the distal extent of our incision. We immediately encountered a copious amount of cloudy yellow synovial fluid. Three cultures were taken and sent to microbiology for analysis. The joint was  copiously irrigated with normal saline followed by Irrisept solution. We did not appreciate any significant degenerative changes of the cartilage of the patellofemoral joint. A hemovac drain was placed within the knee. The quadriceps tendon and medial parapatellar arthrotomy was closed in interrupted fashion with 0 PDS suture. Subcutaneous tissue was closed with 2-0 monocryl suture. Skin was closed with 2-0 nylon suture. Hemovac drain was assembled and dressed with gauze and tegaderm. Sterile dressings were applied to incision consisting of xeroform, fluffs, webril, followed by an ACE bandage. Drapes were removed. Patient was awoken from his anesthetic and transferred back to the hospital bed. He was taken to PACU and tolerated the procedure well without complication.    Complications:  None; patient tolerated the procedure well.    Disposition: PACU - hemodynamically stable.  Condition: stable     Additional Details: Postoperatively, patient will return to the floor under Medicine service with Orthopaedic Surgery following. He will continue broad spectrum antibiotics pending intraoperative cultures and recommendations from Infectious Disease team. He will be weightbearing as tolerated on the right lower extremity in soft dressing. Drain output will be monitored and drain will be removed when output sufficiently decreases. Return to clinic in 2 weeks for wound check and likely suture removal.    Attending Attestation:     Eric Pittman  Phone Number: [phone]

## 2024-11-27 NOTE — CONSULTS
"Nutrition Assessment      Reason for Assessment: Admission nursing screening    Mr. Lynn is a 54-year-old male with PMHx: CAD, MI 2008, HTN, T2DM, gout, depression, GERD, arthritis, seizures who presented to the ED today with complaint of right knee pain and swelling.     NPO for R knee I&D w/ orthopedic surgery.     Nutrition History:  Energy Intake:  (Patient reports eating 100% of dinner last night: Chicken, mashed potatoes, side salad and fresh fruit plate.)  Food and Nutrient History: At the start of patient interview, he reported \"I eat a lot\" and denied change in appetite or PO intake. Also denied any recent weight change. Patient reports eating 3 meals a day at baseline. Patient then went on to say that his appetite and PO intake varies at baseline and that he hasn't noticed any weight loss, but his friends and family have. Patient did admit that his clothes feel looser. Will supplement diet with Ensure High Protein while admitted.  Vitamin/Herbal Supplement Use: None per patient.  Food Allergies/Intolerances:  None  GI Symptoms: None  Oral Problems: None       Anthropometrics:  Height: 180.3 cm (5' 11\")   Weight: 91.2 kg (201 lb)   BMI (Calculated): 28.05  IBW/kg (Dietitian Calculated): 78.2 kg  Percent of IBW: 116.6 %       Weight History:   Wt Readings from Last 10 Encounters:   11/26/24 91.2 kg (201 lb)   10/29/24 102 kg (225 lb 12.8 oz)   09/09/24 105 kg (232 lb)   08/16/24 106 kg (233 lb)   07/23/24 106 kg (233 lb)   06/05/24 102 kg (225 lb)   05/20/24 105 kg (231 lb)   04/22/24 104 kg (230 lb)   04/16/24 112 kg (246 lb)   04/04/24 106 kg (234 lb 8 oz)       Weight Change %:  Weight History / % Weight Change: 10.5% weight loss x 1 month.  Significant Weight Loss: Yes    Nutrition Focused Physical Exam Findings:    Subcutaneous Fat Loss:   Orbital Fat Pads: Well nourished (slightly bulging fat pads)  Buccal Fat Pads: Well nourished (full, rounded cheeks)  Triceps: Well nourished (ample fat " "tissue)  Muscle Wasting:  Temporalis: Well nourished (well-defined muscle)  Pectoralis (Clavicular Region): Well nourished (clavicle not visible)  Deltoid/Trapezius: Well nourished (rounded appearance at arm, shoulder, neck)  Interosseous: Well nourished (muscle bulges)  Trapezius/Infraspinatus/Supraspinatus (Scapular Region): Defer  Quadriceps: Defer  Gastrocnemius: Defer  Edema:  Edema: none  Physical Findings:  Hair: Negative  Eyes: Negative  Mouth: Positive (white coating on tongue. Patient states it's there every morning until he brushes his teeth? Asked bedside RN to check.)  Nails: Negative  Skin: Negative    Nutrition Significant Labs:  CBC Trend:   Results from last 7 days   Lab Units 11/26/24  0343   WBC AUTO x10*3/uL 7.2   RBC AUTO x10*6/uL 4.95   HEMOGLOBIN g/dL 15.0   HEMATOCRIT % 41.6   MCV fL 84   PLATELETS AUTO x10*3/uL 219    , BMP Trend:   Results from last 7 days   Lab Units 11/26/24  0343   GLUCOSE mg/dL 115*   CALCIUM mg/dL 9.1   SODIUM mmol/L 136   POTASSIUM mmol/L 3.8   CO2 mmol/L 23   CHLORIDE mmol/L 103   BUN mg/dL 13   CREATININE mg/dL 1.02    , A1C:  Lab Results   Component Value Date    HGBA1C 5.2 11/26/2024   , BG POCT trend:   Results from last 7 days   Lab Units 11/27/24  0717 11/27/24  0611 11/26/24  1947 11/26/24  1730   POCT GLUCOSE mg/dL 99 96 128* 97    , Renal Lab Trend:   Results from last 7 days   Lab Units 11/26/24  0343   POTASSIUM mmol/L 3.8   SODIUM mmol/L 136   EGFR mL/min/1.73m*2 88   BUN mg/dL 13   CREATININE mg/dL 1.02    , Vit D: No results found for: \"VITD25\" , Vit B12: No results found for: \"IJWPUGDR96\"     Nutrition Specific Medications:    Scheduled medications  acetaminophen, 975 mg, oral, q6h  allopurinol, 400 mg, oral, Daily  amLODIPine, 5 mg, oral, Daily  atorvastatin, 40 mg, oral, Daily  influenza, 0.5 mL, intramuscular, During hospitalization  fluticasone furoate-vilanteroL, 1 puff, inhalation, Daily  hydroCHLOROthiazide, 12.5 mg, oral, Daily  insulin lispro, " 0-10 Units, subcutaneous, TID AC  pantoprazole, 40 mg, oral, Daily  sennosides-docusate sodium, 2 tablet, oral, BID  traZODone, 100 mg, oral, Nightly      Continuous medications     PRN medications  PRN medications: cyclobenzaprine, dextrose, dextrose, glucagon, glucagon, hydrOXYzine HCL, ketorolac, oxyCODONE    I/O:    ;      Dietary Orders (From admission, onward)       Start     Ordered    11/27/24 0921  Oral nutritional supplements  Until discontinued        Question Answer Comment   Deliver with Lunch    Deliver with Dinner    Select supplement: Ensure High Protein        11/27/24 0920    11/27/24 0001  NPO Diet; Effective midnight  Diet effective midnight         11/26/24 1749    11/26/24 1739  May Participate in Room Service  ( ROOM SERVICE MAY PARTICIPATE)  Once        Question:  .  Answer:  Yes    11/26/24 1738                     Estimated Needs:   Total Energy Estimated Needs (kCal): 1950 kCal  Method for Estimating Needs: 78.2 kg / 25 kcal  Total Protein Estimated Needs (g): 100 g  Method for Estimating Needs: 78.2 kg / 1.3 g              Nutrition Diagnosis   Malnutrition Diagnosis  Patient has Malnutrition Diagnosis: No    Nutrition Diagnosis  Patient has Nutrition Diagnosis: Yes  Diagnosis Status (1): New  Nutrition Diagnosis 1: Increased nutrient needs  Related to (1): increased metabolic demand  As Evidenced by (1): R knee septic joint.  Additional Nutrition Diagnosis: Diagnosis 2  Diagnosis Status (2): New  Nutrition Diagnosis 2: Unintended weight loss  Related to (2): report of intermittent inadequate energy intake and lack of appetite  As Evidenced by (2): 10.5% weight loss x 1 month       Nutrition Interventions/Recommendations         Nutrition Prescription:  Individualized Nutrition Prescription Provided for :   Regular diet.   Ensure High Protein (160 kcal and 16 g protein) ordered BID.        Nutrition Interventions:   Interventions: Medical food supplement  Medical Food Supplement:  Commercial beverage  Goal: Drink 2 Ensure daily      Nutrition Education:   Encourage protein with meals and Ensure.        Nutrition Monitoring and Evaluation   Food/Nutrient Related History Monitoring  Monitoring and Evaluation Plan: Energy intake  Criteria: >/= 75% of meals/supplements    Body Composition/Growth/Weight History  Monitoring and Evaluation Plan: Weight  Criteria: Stable weight    Biochemical Data, Medical Tests and Procedures  Monitoring and Evaluation Plan: Electrolyte/renal panel, Glucose/endocrine profile  Criteria: Labs wnl              Time Spent (min): 45 minutes

## 2024-11-27 NOTE — PROGRESS NOTES
"Orthopaedic Surgery Progress Note    Subjective: Evaluated in immediate postoperative period. Pain well controlled considering recent surgery.  Objective:  /82   Pulse 78   Temp 36.2 °C (97.2 °F) (Temporal)   Resp 16   Ht 1.803 m (5' 11\")   Wt 91.2 kg (201 lb)   SpO2 96%   BMI 28.03 kg/m²     Gen: arousable, NAD, appropriately conversational  Cardiac: RRR to peripheral palpation  Resp: nonlabored on RA  GI: soft, nondistended    MSK:  RLE:   - RLE knee drain in place, holding suction   - Post-operative dressing in place without strikethrough bleeding.   -wiggles toes  -Foot wwp, 2+ DP/PT pulse, brisk cap refill  -Compartments soft and compressible    Assessment/Plan: 54 y.o. male s/p I and D R knee on 11/27 with Dr. Pittman.      Plan:  - Weight bearing: WBAT RLE  - DVT ppx: SCDs, pp, recommend and least 6 weeks DVT ppx  - Diet: Regular okay from orthopaedic perspective  - Pain: pp  - Antibiotics: pp, pending intra-op clx.  -ID consult: appreciate recs  - Bowel Regimen: rec aggressive bowel regimen  - PT/OT  - Drain: hemovac x1 RLE knee- please record drain output q8 hours  -dressing: nylons, xeroform, fluffs, webril, ace  --- primary/nursing can reinforce dressing as needed.    Plan discussed with attending. Recommendations not finalized until note signed by attending.    Kianna Dia MD  Orthopaedic Surgery PGY-1  Available by Epic Chat    While admitted, this patient will be followed by the Ortho Trauma Team, available via Epic Chat weekdays 6a-6p. Please page [phone] on nights and weekends.    Ortho Trauma  First Call: Kianna Dia, PGY-1  Second Call: Sohail Tejada, PGY-2  Third Call: Ramin Santana, PGY-3      "

## 2024-11-27 NOTE — DISCHARGE INSTRUCTIONS
Follow-Up Instructions  You will need to be seen in clinic by Dr. Pittman in 2-3 weeks for a post-operative evaluation.  This appointment will be in the outpatient surgical specialty services [place], on the [place] [facility]    You will need to call and schedule an appointment, unless there is a previous appointment that appears on your discharge instructions.  The direct orthopaedic clinic appointment line phone number is [phone].  Please do not delay in calling to make this appointment.    You should also follow up with your primary care provider in 1-2 weeks.    Activity Restrictions  1) No driving until further instructed by your orthopaedic physician, which will be addressed at your outpatient appointments.    2) No driving or operating heavy machinery while taking narcotic pain medication.    3) Weight bearing status --> WBAT RLE.     Wound care instructions:   1) Leave operative dressing in place until postoperative day 7. Then remove and leave incision open to air. Let water run freely over incision when showering, do not scrub. Do not soak in pool or tub.    2) Call if any drainage after 7 days, increased redness/warmth/swelling at incision site, abnormal pain/tenderness of the extremity, abnormal swelling of the extremity that does not respond to elevation, SOB/chest pain. Do not swim in pools or ponds until 3 months after surgery.

## 2024-11-27 NOTE — PROGRESS NOTES
11/27/24 1338   Discharge Planning   Living Arrangements Children   Support Systems Children   Assistance Needed Assist for adls   Type of Residence Private residence   Number of Stairs to Enter Residence 0   Number of Stairs Within Residence 0   Home or Post Acute Services In home services   Type of Home Care Services Home health aide   Expected Discharge Disposition Home H   Does the patient need discharge transport arranged? Yes   RoundTrip coordination needed? Yes   Has discharge transport been arranged? No   Financial Resource Strain   How hard is it for you to pay for the very basics like food, housing, medical care, and heating? Hard   Housing Stability   In the last 12 months, was there a time when you were not able to pay the mortgage or rent on time? N   At any time in the past 12 months, were you homeless or living in a shelter (including now)? N   Transportation Needs   In the past 12 months, has lack of transportation kept you from medical appointments or from getting medications? yes   In the past 12 months, has lack of transportation kept you from meetings, work, or from getting things needed for daily living? Yes   Stroke Family Assessment   Stroke Family Assessment Needed No   Intensity of Service   Intensity of Service 0-30 min     Transitional Care Coordination Progress Note:  Patient discussed during interdisciplinary rounds.   Team members present: MD, ADRIANA  Plan per Medical/Surgical team: OR 11/27 with ortho for R knee I&D  Payor: Home Medicare  Discharge disposition: pending  Potential Barriers: none  ADOD: 2-3 days    Previous Home Care: [facility] 30hrs/week  DME: rollator, cane, shower chair, CPAP  Pharmacy: [facility] (deliver) or [facility] pharmacy  Falls: Denies  PCP:  Christ Weston; last visit within the last month  Met with patient at bedside, provided introduction of self and role. Patient states he lives at home with son. Patient states he requires assist with adls at times, son  is his [facility] and assist with care. Patient states he walks to appointments. Patient states no concerns obtaining/affording medications; states no social/financial concerns. Patient states he is having difficulty affording gas and electric. CHW/SW notified via email to provide resources. Patient states he will need assist with transport at time of discharge. Will continue to monitor for discharge planning needs.     Layne BOWLING, RN  Transitional Care Coordinator (TCC)  [phone]

## 2024-11-27 NOTE — ANESTHESIA POSTPROCEDURE EVALUATION
Patient: Tip Lynn    Procedure Summary       Date: 11/27/24 Room / Location: [facility] OR 01 / [facility] OR    Anesthesia Start: 1400 Anesthesia Stop: 1538    Procedure: Debridement Knee (Right: Knee) Diagnosis:       Pyogenic arthritis of right knee joint, due to unspecified organism (Multi)      (Pyogenic arthritis of right knee joint, due to unspecified organism (Multi) [M00.9])    Surgeons: Eric Pittman MD Responsible Provider: Carleen Nice MD    Anesthesia Type: general ASA Status: 3            Anesthesia Type: general    Vitals Value Taken Time   /96 11/27/24 1535   Temp 36.0 11/27/24 1541   Pulse 100 11/27/24 1539   Resp 22 11/27/24 1539   SpO2 100 % 11/27/24 1539   Vitals shown include unfiled device data.    Anesthesia Post Evaluation    Patient location during evaluation: PACU  Patient participation: complete - patient participated  Level of consciousness: awake  Pain score: 7  Pain management: inadequate (0.2mg dilaudid given upon arrival in PACU and documented, nurse notified and is following up on pain management)  Airway patency: patent  Cardiovascular status: acceptable  Respiratory status: acceptable  Hydration status: acceptable  Postoperative Nausea and Vomiting: none        No notable events documented.

## 2024-11-27 NOTE — H&P
[facility] Department of Orthopaedic Surgery   Surgical History & Physical <30 Days    Reason for Surgery:  R knee SA   Planned Procedure: R knee I&D    History & Physical Reviewed:  I have reviewed the History and Physical for obtained within the last 30 days. Relevant findings and updates are noted below:  No significant changes.    Home medications were reviewed with significant updates noted below:  No significant changes.    ERAS patient?: No    COVID-19 Risk Consent:   Surgeon has reviewed the key risks related to harjinder COVID-19 and subsequent sequelae.     11/27/24 at 1:11 AM - Kg Reeder MD

## 2024-11-27 NOTE — PROGRESS NOTES
Tip Lynn is a 54 y.o. male on day 1 of admission presenting with Pyogenic arthritis of right knee joint (Multi).      Subjective   No events        Objective     Last Recorded Vitals  BP (!) 179/112   Pulse 100   Temp 37.3 °C (99.1 °F)   Resp 13   Wt 91.2 kg (201 lb)   SpO2 94%   Intake/Output last 3 Shifts:    Intake/Output Summary (Last 24 hours) at 11/27/2024 1838  Last data filed at 11/27/2024 1630  Gross per 24 hour   Intake 900 ml   Output 405 ml   Net 495 ml       Admission Weight  Weight: 91.2 kg (201 lb) (11/26/24 0231)    Daily Weight  11/27/24 : 91.2 kg (201 lb)    Image Results  ECG 12 Lead  Normal sinus rhythm  Normal ECG  When compared with ECG of 29-OCT-2024 14:33,  No significant change was found  See ED provider note for full interpretation and clinical correlation  Confirmed by Janie Calvo ([id number]) on 11/26/2024 7:25:03 PM  XR chest 1 view  Narrative: Interpreted By:  Rico Bills,   STUDY:  XR CHEST 1 VIEW;  11/26/2024 9:51 am      INDICATION:  Signs/Symptoms:pre op.          COMPARISON:  02/29/2024      ACCESSION NUMBER(S):  [id number]      ORDERING CLINICIAN:  TIAGO LOTT      FINDINGS:  AP radiograph of the chest was provided.          CARDIOMEDIASTINAL SILHOUETTE:  Cardiomediastinal silhouette is normal in size and configuration.      LUNGS:  Lungs are clear.      ABDOMEN:  No remarkable upper abdominal findings.      BONES:  No acute osseous changes.      Impression: 1.  No evidence of acute cardiopulmonary process.              MACRO:  None      Signed by: Rico Bills 11/26/2024 10:04 AM  Dictation workstation:   [id number]  Lower extremity venous duplex right  Narrative: Interpreted By:  Gonzalo Michelle,   STUDY:  VAS US LOWER EXTREMITY VENOUS DUPLEX RIGHT; 11/26/2024 5:41 am      INDICATION:  Signs/Symptoms:Rule out DVT.      COMPARISON:  None.      ACCESSION NUMBER(S):  [id number]      ORDERING CLINICIAN:  TIAGO LOTT      TECHNIQUE:  Vascular ultrasound of the right  lower extremity was performed. Real  time compression views as well as Gray scale, color Doppler and  spectral Doppler waveform analysis was performed.      FINDINGS:  Evaluation of the visualized portions of the right common femoral  vein, proximal, mid, and distal femoral vein, and popliteal vein were  performed.  Evaluation of the visualized portions of the posterior  tibial and peroneal veins were also performed.  In addition,  evaluation of the contralateral common femoral vein was performed.      Limitations: None      The evaluated veins demonstrate normal compressibility. There is  intact venous flow demonstrating normal respiratory variability and  normal augmentation of flow with calf compression. Therefore, there  is no ultrasonographic evidence for deep vein thrombosis within the  evaluated veins. No evidence of thrombus is seen within the  contralateral common femoral vein.      There is a 6.8 x 1.3 x 3.6 cm fluid collection within the right  popliteal fossa, consistent with a Baker's cyst.      Impression: No sonographic evidence for deep vein thrombosis within the evaluated  veins of the right lower extremity.      Baker's cyst within the right popliteal fossa, as described above.      MACRO:  None      Signed by: Gonzalo Michelle 11/26/2024 5:57 AM  Dictation workstation:   [id number]      Physical Exam    Vitals reviewed.   Constitutional:       Appearance: Normal appearance.   HENT:      Head: Normocephalic.     Eyes:      Extraocular Movements: Extraocular movements intact.      Conjunctiva/sclera: Conjunctivae normal.      Pupils: Pupils are equal, round, and reactive to light.   Cardiovascular:      Rate and Rhythm: Normal rate and regular rhythm.      Pulses: Normal pulses.      Heart sounds: Normal heart sounds, S1 normal and S2 normal.   Pulmonary:      Effort: Pulmonary effort is normal.      Breath sounds: Normal breath sounds and air entry.   Abdominal:      General: Abdomen is flat. Bowel  sounds are normal.      Palpations: Abdomen is soft.   Skin:     General: Skin is warm and dry.   Neurological:      General: No focal deficit present.      Mental Status: He is alert and oriented to person, place, and time. Mental status is at baseline.   Psychiatric:         Attention and Perception: Attention normal.         Mood and Affect: Mood normal.         Speech: Speech normal.   Relevant Results               Assessment/Plan                  Assessment & Plan  Pyogenic arthritis of right knee joint (Multi)    Pyogenic arthritis of right knee joint, due to unspecified organism (Multi)            Mr. Lynn is a 54-year-old male with PMHx: CAD, MI 2008, HTN, T2DM, gout, depression, GERD, arthritis, seizures who presented to the ED with complaint of right knee pain and swelling.  States symptoms started on Friday denies any injury.  Was seen by Ortho down in the ED for joint aspiration.  Right knee showed a small knee joint effusion and CXR was negative for acute cardiopulmonary process.  Patient to be admitted for right knee septic joint.  S/p R knee I&D 11/27. Has 1 R knee hemovac drain in place, not sutured in. RLE knee drain in place and record output q8hrs. Ortho to manage drain. He has Soft dressing nylons, xeroform, fluffs, webril, ace (can be reinforced by nursing as needed). Ortho took intra-op cultures, consulted ID. PT/OT. WBAT RLE. DVT ppx with Lovenox       Assessment/Plan:     Right knee septic joint  S/p R knee I&D 11/27  -Ortho consulted and appreciate recs     CAD-MI 2008  HTN  HLD  -Continue home amlodipine 5 mg daily  -Continue home atorvastatin 40 mg daily  -Continue hydrochlorothiazide 12.5 mg daily     T2DM  -Hold home Trulicity and metformin  -Moderate Humalog SSI 3 times daily and at bedtime while in the hospital  -A1c 5.2  11/26/24  Gout  -Continue home allopurinol 400 mg daily     COPD  -Continue home Symbicort as Breo Ellipta     GERD  -Continue home Protonix 40 mg daily      Depression  -Continue home trazodone 100 mg nightly        Fluids: monitor and replete as needed  Electrolytes: monitor and replete as needed  Nutrition: Regular diet   GI prophylaxis: Protonix 40mg QD  DVT prophylaxis: SCD  Code Status: Full code      Disposition:  tbd     50 min             Betty Mahoney MD

## 2024-11-27 NOTE — CARE PLAN
Problem: Skin  Goal: Decreased wound size/increased tissue granulation at next dressing change  Outcome: Progressing  Goal: Participates in plan/prevention/treatment measures  Outcome: Progressing  Goal: Prevent/manage excess moisture  Outcome: Progressing  Goal: Prevent/minimize sheer/friction injuries  Outcome: Progressing  Goal: Promote/optimize nutrition  Outcome: Progressing  Goal: Promote skin healing  Outcome: Progressing     Problem: Pain  Goal: Takes deep breaths with improved pain control throughout the shift  Outcome: Progressing  Goal: Turns in bed with improved pain control throughout the shift  Outcome: Progressing  Goal: Walks with improved pain control throughout the shift  Outcome: Progressing  Goal: Performs ADL's with improved pain control throughout shift  Outcome: Progressing  Goal: Participates in PT with improved pain control throughout the shift  Outcome: Progressing  Goal: Free from opioid side effects throughout the shift  Outcome: Progressing  Goal: Free from acute confusion related to pain meds throughout the shift  Outcome: Progressing     Problem: Nutrition  Goal: Less than 5 days NPO/clear liquids  Outcome: Progressing  Goal: Oral intake greater than 50%  Outcome: Progressing  Goal: Oral intake greater 75%  Outcome: Progressing  Goal: Consume prescribed supplement  Outcome: Progressing  Goal: Adequate PO fluid intake  Outcome: Progressing  Goal: Nutrition support goals are met within 48 hrs  Outcome: Progressing  Goal: Nutrition support is meeting 75% of nutrient needs  Outcome: Progressing  Goal: Tube feed tolerance  Outcome: Progressing  Goal: BG  mg/dL  Outcome: Progressing  Goal: Lab values WNL  Outcome: Progressing  Goal: Electrolytes WNL  Outcome: Progressing  Goal: Promote healing  Outcome: Progressing  Goal: Maintain stable weight  Outcome: Progressing  Goal: Reduce weight from edema/fluid  Outcome: Progressing  Goal: Gradual weight gain  Outcome: Progressing  Goal:  Improve ostomy output  Outcome: Progressing   The patient's goals for the shift include      The clinical goals for the shift include pt will report a decrease in pain

## 2024-11-27 NOTE — ANESTHESIA PROCEDURE NOTES
Airway  Date/Time: 11/27/2024 2:11 PM  Urgency: elective    Airway not difficult    Staffing  Performed: RAMIN   Authorized by: Siddharth Lynn DO    Performed by: RAMIN Cordero  Patient location during procedure: OR    Indications and Patient Condition  Indications for airway management: anesthesia  Spontaneous Ventilation: absent  Sedation level: deep  Preoxygenated: yes  Patient position: sniffing  MILS maintained throughout  Mask difficulty assessment: 1 - vent by mask  Planned trial extubation    Final Airway Details  Final airway type: endotracheal airway      Successful airway: ETT  Cuffed: yes   Successful intubation technique: direct laryngoscopy  Endotracheal tube insertion site: oral  Blade: Chloe  Blade size: #3  ETT size (mm): 7.5  Cormack-Lehane Classification: grade I - full view of glottis  Placement verified by: chest auscultation   Measured from: teeth  ETT to teeth (cm): 22  Number of attempts at approach: 1  Number of other approaches attempted: 0

## 2024-11-27 NOTE — ANESTHESIA PREPROCEDURE EVALUATION
Patient: Tip Lynn    Procedure Information       Date/Time: 11/27/24 1248    Procedure: Debridement Knee (Right: Knee)    Location: [facility] OR 01 / [facility]    Surgeons: Eric Pittman MD            Relevant Problems   Cardiac   (+) Atherosclerosis of coronary artery   (+) Atypical chest pain   (+) Hyperlipidemia   (+) Hypertension, essential, benign   (+) Old myocardial infarction      Pulmonary   (+) Asthma   (+) Lung nodules   (+) Mild intermittent asthma      Neuro   (+) Anxiety with somatic features   (+) Depressive disorder   (+) Epilepsy   (+) Seizure (Multi)      GI   (+) Bleeding internal hemorrhoids   (+) Gastroesophageal reflux disease      HEENT   (+) Seasonal allergies   (+) Sensorineural hearing loss, bilateral      ID   (+) Helicobacter pylori infection   (+) Onychomycosis   (+) Pyogenic arthritis of right knee joint (Multi)   (+) Pyogenic arthritis of right knee joint, due to unspecified organism (Multi)   (+) STI (sexually transmitted infection)       Clinical information reviewed:   Tobacco  Allergies  Meds  Problems  Med Hx  Surg Hx   Fam Hx  Soc   Hx        NPO Detail:  No data recorded     Physical Exam    Airway  Mallampati: III  TM distance: >3 FB  Neck ROM: full     Cardiovascular - normal exam  Rhythm: regular  Rate: normal     Dental    Pulmonary - normal exam  Breath sounds clear to auscultation     Abdominal            Anesthesia Plan    History of general anesthesia?: yes  History of complications of general anesthesia?: no    ASA 3     general     The patient is not a current smoker.  Patient did not smoke on day of procedure.    intravenous induction   Postoperative administration of opioids is intended.  Anesthetic plan and risks discussed with patient.  Use of blood products discussed with patient who consented to blood products.    Plan discussed with attending.

## 2024-11-27 NOTE — PROGRESS NOTES
Patient referred by SW- patient request resources for gas and electric.  CHW attempted contact w/ patient no answer. CHW spoke w/ nurse    request patient answer phone- no answer. CHW to patient cell phone  request patient return call  [phone].         Discussed the following topics on behalf of the patient:  []  Behavioral Health Assistance     []  Case Management  []   Assistance  []  Digital Equity Assistance  []  Dental Health Assistance  []  Education Assistance  []  Employment Assistance  [x]  Financial Strain Relief Assistance  []  Food Insecurity Assistance  []  Healthcare Coverage Assistance  []  Housing Stability Assistance  []  IP Violence Relief Assistance  []  Legal Assistance  []  Physical Activity Assistance  [x]  Social Connection Assistance  []  Stress Relief Assistance   []  Substance Abuse Assistance  []  Transportation Assistance  [x]  Utility Assistance  []  Other: [insert comment here]    Next Steps:         Mauro Stack MA

## 2024-11-28 LAB
ALBUMIN SERPL BCP-MCNC: 3.6 G/DL (ref 3.4–5)
ANION GAP SERPL CALC-SCNC: 14 MMOL/L (ref 10–20)
BUN SERPL-MCNC: 17 MG/DL (ref 6–23)
CALCIUM SERPL-MCNC: 9 MG/DL (ref 8.6–10.6)
CHLORIDE SERPL-SCNC: 100 MMOL/L (ref 98–107)
CO2 SERPL-SCNC: 26 MMOL/L (ref 21–32)
CREAT SERPL-MCNC: 1.25 MG/DL (ref 0.5–1.3)
CRYSTALS FLD MICRO: ABNORMAL
EGFRCR SERPLBLD CKD-EPI 2021: 69 ML/MIN/1.73M*2
ERYTHROCYTE [DISTWIDTH] IN BLOOD BY AUTOMATED COUNT: 13.2 % (ref 11.5–14.5)
GLUCOSE BLD MANUAL STRIP-MCNC: 161 MG/DL (ref 74–99)
GLUCOSE BLD MANUAL STRIP-MCNC: 165 MG/DL (ref 74–99)
GLUCOSE BLD MANUAL STRIP-MCNC: 170 MG/DL (ref 74–99)
GLUCOSE BLD MANUAL STRIP-MCNC: 197 MG/DL (ref 74–99)
GLUCOSE BLD MANUAL STRIP-MCNC: 224 MG/DL (ref 74–99)
GLUCOSE SERPL-MCNC: 149 MG/DL (ref 74–99)
HCT VFR BLD AUTO: 40.1 % (ref 41–52)
HGB BLD-MCNC: 13.7 G/DL (ref 13.5–17.5)
MCH RBC QN AUTO: 30.2 PG (ref 26–34)
MCHC RBC AUTO-ENTMCNC: 34.2 G/DL (ref 32–36)
MCV RBC AUTO: 89 FL (ref 80–100)
NRBC BLD-RTO: 0 /100 WBCS (ref 0–0)
PHOSPHATE SERPL-MCNC: 3.3 MG/DL (ref 2.5–4.9)
PLATELET # BLD AUTO: 237 X10*3/UL (ref 150–450)
POTASSIUM SERPL-SCNC: 4.2 MMOL/L (ref 3.5–5.3)
RBC # BLD AUTO: 4.53 X10*6/UL (ref 4.5–5.9)
SODIUM SERPL-SCNC: 136 MMOL/L (ref 136–145)
WBC # BLD AUTO: 8.3 X10*3/UL (ref 4.4–11.3)

## 2024-11-28 PROCEDURE — 85027 COMPLETE CBC AUTOMATED: CPT | Performed by: STUDENT IN AN ORGANIZED HEALTH CARE EDUCATION/TRAINING PROGRAM

## 2024-11-28 PROCEDURE — 2500000001 HC RX 250 WO HCPCS SELF ADMINISTERED DRUGS (ALT 637 FOR MEDICARE OP): Performed by: STUDENT IN AN ORGANIZED HEALTH CARE EDUCATION/TRAINING PROGRAM

## 2024-11-28 PROCEDURE — 2500000004 HC RX 250 GENERAL PHARMACY W/ HCPCS (ALT 636 FOR OP/ED): Performed by: STUDENT IN AN ORGANIZED HEALTH CARE EDUCATION/TRAINING PROGRAM

## 2024-11-28 PROCEDURE — 2500000002 HC RX 250 W HCPCS SELF ADMINISTERED DRUGS (ALT 637 FOR MEDICARE OP, ALT 636 FOR OP/ED): Performed by: STUDENT IN AN ORGANIZED HEALTH CARE EDUCATION/TRAINING PROGRAM

## 2024-11-28 PROCEDURE — 97116 GAIT TRAINING THERAPY: CPT | Mod: GP

## 2024-11-28 PROCEDURE — 99233 SBSQ HOSP IP/OBS HIGH 50: CPT | Performed by: STUDENT IN AN ORGANIZED HEALTH CARE EDUCATION/TRAINING PROGRAM

## 2024-11-28 PROCEDURE — 97161 PT EVAL LOW COMPLEX 20 MIN: CPT | Mod: GP

## 2024-11-28 PROCEDURE — 80069 RENAL FUNCTION PANEL: CPT | Performed by: STUDENT IN AN ORGANIZED HEALTH CARE EDUCATION/TRAINING PROGRAM

## 2024-11-28 PROCEDURE — 36415 COLL VENOUS BLD VENIPUNCTURE: CPT | Performed by: STUDENT IN AN ORGANIZED HEALTH CARE EDUCATION/TRAINING PROGRAM

## 2024-11-28 PROCEDURE — 1100000001 HC PRIVATE ROOM DAILY

## 2024-11-28 PROCEDURE — 99222 1ST HOSP IP/OBS MODERATE 55: CPT | Performed by: INTERNAL MEDICINE

## 2024-11-28 PROCEDURE — 82947 ASSAY GLUCOSE BLOOD QUANT: CPT

## 2024-11-28 RX ORDER — COLCHICINE 0.6 MG/1
1.2 TABLET ORAL ONCE
Status: COMPLETED | OUTPATIENT
Start: 2024-11-28 | End: 2024-11-28

## 2024-11-28 RX ORDER — COLCHICINE 0.6 MG/1
0.6 TABLET ORAL ONCE
Status: COMPLETED | OUTPATIENT
Start: 2024-11-28 | End: 2024-11-28

## 2024-11-28 ASSESSMENT — PAIN - FUNCTIONAL ASSESSMENT
PAIN_FUNCTIONAL_ASSESSMENT: 0-10

## 2024-11-28 ASSESSMENT — PAIN SCALES - GENERAL
PAINLEVEL_OUTOF10: 9
PAINLEVEL_OUTOF10: 4
PAINLEVEL_OUTOF10: 10 - WORST POSSIBLE PAIN
PAINLEVEL_OUTOF10: 9
PAINLEVEL_OUTOF10: 6
PAINLEVEL_OUTOF10: 10 - WORST POSSIBLE PAIN
PAINLEVEL_OUTOF10: 10 - WORST POSSIBLE PAIN
PAINLEVEL_OUTOF10: 6

## 2024-11-28 ASSESSMENT — PAIN DESCRIPTION - LOCATION
LOCATION: KNEE
LOCATION: KNEE

## 2024-11-28 ASSESSMENT — PAIN DESCRIPTION - DESCRIPTORS: DESCRIPTORS: ACHING

## 2024-11-28 ASSESSMENT — COGNITIVE AND FUNCTIONAL STATUS - GENERAL
WALKING IN HOSPITAL ROOM: A LITTLE
DRESSING REGULAR LOWER BODY CLOTHING: A LITTLE
MOVING TO AND FROM BED TO CHAIR: A LITTLE
TURNING FROM BACK TO SIDE WHILE IN FLAT BAD: A LITTLE
MOVING FROM LYING ON BACK TO SITTING ON SIDE OF FLAT BED WITH BEDRAILS: A LITTLE
WALKING IN HOSPITAL ROOM: A LITTLE
STANDING UP FROM CHAIR USING ARMS: A LOT
TURNING FROM BACK TO SIDE WHILE IN FLAT BAD: A LOT
EATING MEALS: A LITTLE
HELP NEEDED FOR BATHING: A LITTLE
MOBILITY SCORE: 18
MOVING TO AND FROM BED TO CHAIR: A LITTLE
TOILETING: A LITTLE
MOVING FROM LYING ON BACK TO SITTING ON SIDE OF FLAT BED WITH BEDRAILS: A LITTLE
PERSONAL GROOMING: A LITTLE
CLIMB 3 TO 5 STEPS WITH RAILING: A LITTLE
MOBILITY SCORE: 14
DAILY ACTIVITIY SCORE: 18
STANDING UP FROM CHAIR USING ARMS: A LITTLE
DRESSING REGULAR UPPER BODY CLOTHING: A LITTLE
CLIMB 3 TO 5 STEPS WITH RAILING: TOTAL

## 2024-11-28 ASSESSMENT — PAIN SCALES - PAIN ASSESSMENT IN ADVANCED DEMENTIA (PAINAD): TOTALSCORE: MEDICATION (SEE MAR)

## 2024-11-28 ASSESSMENT — PAIN DESCRIPTION - ORIENTATION
ORIENTATION: RIGHT
ORIENTATION: RIGHT

## 2024-11-28 ASSESSMENT — ACTIVITIES OF DAILY LIVING (ADL): ADL_ASSISTANCE: INDEPENDENT

## 2024-11-28 NOTE — PROGRESS NOTES
Physical Therapy    Physical Therapy Evaluation & Treatment    Patient Name: Tip Lynn  MRN: [id number]  Department: [place] [address]  Room: [address] [place], [place] [address]  Today's Date: 11/28/2024   Time Calculation  Start Time: 1203  Stop Time: 1246  Time Calculation (min): 43 min    Assessment/Plan   PT Assessment  PT Assessment Results: Decreased strength, Decreased endurance, Impaired balance, Decreased mobility  Rehab Prognosis: Good  Barriers to Discharge: help available at home, pain  Evaluation/Treatment Tolerance: Patient tolerated treatment well, Patient limited by pain  Medical Staff Made Aware: Yes  Strengths: Ability to acquire knowledge, Attitude of self  End of Session Communication: Bedside nurse  Assessment Comment: Pt with good participation and motivation during sesison but limited 2/2 pain. Pt was able to perform multiple small trials of ambulation with min A and WW. Pt with L soft knee as he fatigued and decreased ability to WB on RLE. Patient would benefit from skilled physical therapy to maximize functional mobility and safety. Pt is appropriate for high intensity therapy when medically appropriate for DC.  End of Session Patient Position: Bed, 3 rail up, Alarm off, not on at start of session (CB in reach)   IP OR SWING BED PT PLAN  Inpatient or Swing Bed: Inpatient  PT Plan  Treatment/Interventions: Bed mobility, Gait training, Balance training, Transfer training, Strengthening, Range of motion, Endurance training, Therapeutic activity, Therapeutic exercise  PT Plan: Ongoing PT  PT Frequency: 5 times per week  PT Discharge Recommendations: High intensity level of continued care  Equipment Recommended upon Discharge:  (TBD)  PT Recommended Transfer Status: Assist x1, Assistive device (WW)  PT - OK to Discharge: Yes (meaning pt seen and dc rec made)  RN cleared prior to session     Subjective   General Visit Information:  General  Reason for Referral: 54 y.o. male presented to the ED with right knee pain and swelling.  S/p I and D R knee on 11/27 with Dr. Pittman.  Past Medical History Relevant to Rehab: CAD, MI 2008, HTN, T2DM, gout, depression, GERD, arthritis, seizures  Family/Caregiver Present: No  Prior to Session Communication: Bedside nurse  Patient Position Received: Bed, 3 rail up, Alarm off, not on at start of session  Preferred Learning Style: auditory, verbal  General Comment: Pt pleasant and agreeable to therapy  Home Living:  Home Living  Type of Home: House  Lives With: Dependent children (cousin and 18 y.o son)  Home Adaptive Equipment:  (rollator)  Home Layout: One level  Home Access: Ramped entrance  Bathroom Shower/Tub: Tub/shower unit  Bathroom Equipment: Grab bars in shower, Shower chair with back  Prior Level of Function:  Prior Function Per Pt/Caregiver Report  Level of [place]: Independent with ADLs and functional transfers, Independent with homemaking with ambulation  Receives Help From: Family  ADL Assistance: Independent  Homemaking Assistance: Independent  Ambulatory Assistance: Independent  Prior Function Comments: Pt was IND in all ADLs and IADLs prior to admit and used rollator for ambulation. Pt states he has had about 4 falls in the last year related to L knee giving out on him sometimes.  Precautions:  Precautions  LE Weight Bearing Status: Weight Bearing as Tolerated (RLE)  Medical Precautions: Fall precautions    VITALS:  BP supine 136/79  BP sitting 146/82  BP standing 157/80    Objective   Pain:  Pain Assessment  Pain Assessment: 0-10  0-10 (Numeric) Pain Score: 9  Pain Type: Surgical pain  Pain Location: Knee  Pain Orientation: Right  Pain Interventions: Repositioned, Cold applied, Elevated (RN notified)  Response to Interventions: Resting quietly  Cognition:  Cognition  Overall Cognitive Status: Within Functional Limits  Arousal/Alertness: Appropriate responses to stimuli  Orientation Level: Oriented X4  Following Commands: Follows all commands and directions without  difficulty    General Assessments:  Activity Tolerance  Endurance: Tolerates 10 - 20 min exercise with multiple rests    Sensation  Light Touch:  (neuropathy in B LE)    Strength  Strength Comments: BLE grossly >/= 3/5  Coordination  Movements are Fluid and Coordinated: Yes    Postural Control  Postural Control: Within Functional Limits    Static Sitting Balance  Static Sitting-Balance Support: Bilateral upper extremity supported, Feet supported  Static Sitting-Level of Assistance: Contact guard    Static Standing Balance  Static Standing-Balance Support: Bilateral upper extremity supported  Static Standing-Level of Assistance: Minimum assistance  Functional Assessments:  Bed Mobility  Bed Mobility: Yes  Bed Mobility 1  Bed Mobility 1: Supine to sitting  Level of Assistance 1: Moderate assistance  Bed Mobility Comments 1: mod A at trunk and to move RLE; min vc for sequencing and hand placement  Bed Mobility 2  Bed Mobility  2: Sitting to supine  Level of Assistance 2: Minimum assistance  Bed Mobility Comments 2: A for RLE; min vc for sequencing and positioning    Transfers  Transfer: Yes  Transfer 1  Transfer From 1: Sit to, Stand to  Transfer to 1: Stand, Sit  Technique 1: Sit to stand, Stand to sit  Transfer Device 1: Walker  Transfer Level of Assistance 1: Moderate assistance  Trials/Comments 1: 2 trials; mod vc for hand placement, kicking out LLE, and safety    Ambulation/Gait Training  Apart of treatment   Extremity/Trunk Assessments:  RLE   RLE :  (grossly >/= 3/5)  LLE   LLE :  (grossly >/= 3/5)  Treatments:  Ambulation/Gait Training  Ambulation/Gait Training Performed: Yes  Ambulation/Gait Training 1  Surface 1: Level tile  Device 1: Rolling walker  Assistance 1: Minimum assistance  Quality of Gait 1: Inconsistent stride length, Decreased step length, Forward flexed posture, Soft knee(s) (decreased foot clearance and chris, decreased R stance time, L soft knee)  Comments/Distance (ft) 1: 3ft R, 2ft L, 3ft  fwd/bwd, 2ft R; mod vc for sequencing, posture, WW use, and safety; pt with L soft knee and decreased WB on RLE.    Increased time for monitoring of BP 2/2 pt dizziness   Static/dynamic standing ~ 3 min with WW and min A pre gait    Outcome Measures:  [facility] Basic Mobility  Turning from your back to your side while in a flat bed without using bedrails: A little  Moving from lying on your back to sitting on the side of a flat bed without using bedrails: A lot  Moving to and from bed to chair (including a wheelchair): A little  Standing up from a chair using your arms (e.g. wheelchair or bedside chair): A lot  To walk in hospital room: A little  Climbing 3-5 steps with railing: Total  Basic Mobility - Total Score: 14    Encounter Problems       Encounter Problems (Active)       PT Problem       Patient will ambulate >80' with LRAD and Modified Independent        Start:  11/28/24    Expected End:  12/12/24            Patient will perform sit<>stand transfer with LRAD, and Modified Independent        Start:  11/28/24    Expected End:  12/12/24            Patient will complete supine to sit and sit to supine Independent        Start:  11/28/24    Expected End:  12/12/24            Pt will demo static/dynamic standing with LRAD and IND x 2 min with 0 LOB       Start:  11/28/24    Expected End:  12/12/24                   Education Documentation  Precautions, taught by Mary Kessler, PT at 11/28/2024  2:52 PM.  Learner: Patient  Readiness: Acceptance  Method: Explanation, Demonstration  Response: Needs Reinforcement, Verbalizes Understanding    Body Mechanics, taught by Mary Kessler PT at 11/28/2024  2:52 PM.  Learner: Patient  Readiness: Acceptance  Method: Explanation, Demonstration  Response: Needs Reinforcement, Verbalizes Understanding    Mobility Training, taught by Mary Kessler PT at 11/28/2024  2:52 PM.  Learner: Patient  Readiness: Acceptance  Method: Explanation, Demonstration  Response: Needs  Reinforcement, Verbalizes Understanding    Education Comments  No comments found.

## 2024-11-28 NOTE — PROGRESS NOTES
"Orthopaedic Surgery Progress Note    Subjective: NAEO. Patient in moderate amount of pain from surgery. Denies CP/SOB/N/v  Objective:  /72   Pulse 85   Temp 36.6 °C (97.9 °F)   Resp 16   Ht 1.803 m (5' 11\")   Wt 91.2 kg (201 lb)   SpO2 93%   BMI 28.03 kg/m²     Gen: arousable, NAD, appropriately conversational  Cardiac: RRR to peripheral palpation  Resp: nonlabored on RA  GI: soft, nondistended    MSK:  RLE:   - RLE knee drain in place, holding suction   - Post-operative dressing in place without strikethrough bleeding.   -wiggles toes  -Foot wwp, 2+ DP/PT pulse, brisk cap refill  -Compartments soft and compressible    Assessment/Plan: 54 y.o. male s/p I and D R knee on 11/27 with Dr. Pittman.      Plan:  - Weight bearing: WBAT RLE  - DVT ppx: SCDs, pp, recommend and least 6 weeks DVT ppx  - Diet: Regular okay from orthopaedic perspective  - Pain: pp  - Antibiotics: pp, pending intra-op clx. Please start on broad spectrum abx, recommend ID consult   -ID consult: appreciate recs  - Bowel Regimen: rec aggressive bowel regimen  - PT/OT  - Drain: hemovac x1 RLE knee- please record drain output q8 hours  -dressing: nylons, xeroform, fluffs, webril, ace  --- primary/nursing can reinforce dressing as needed.    Plan discussed with attending. Recommendations not finalized until note signed by attending.    Sohail Tejada MD  Orthopaedic Surgery PGY-1  Available by Epic Chat    While admitted, this patient will be followed by the Ortho Trauma Team, available via Epic Chat weekdays 6a-6p. Please page [phone] on nights and weekends.    Ortho Trauma  First Call: Kianna Dia, PGY-1  Second Call: Sohail Tejada, PGY-2  Third Call: Ramin Santana, PGY-3      "

## 2024-11-28 NOTE — CARE PLAN
The patient's goals for the shift include pt comfort maintained    The clinical goals for the shift include adequate pain management

## 2024-11-28 NOTE — PROGRESS NOTES
Tip Lynn is a 54 y.o. male on day 2 of admission presenting with Pyogenic arthritis of right knee joint (Multi).      Subjective   No events        Objective     Last Recorded Vitals  /67   Pulse 93   Temp 36.5 °C (97.7 °F)   Resp 16   Wt 91.2 kg (201 lb)   SpO2 91%   Intake/Output last 3 Shifts:    Intake/Output Summary (Last 24 hours) at 11/28/2024 1850  Last data filed at 11/28/2024 0100  Gross per 24 hour   Intake --   Output 500 ml   Net -500 ml       Admission Weight  Weight: 91.2 kg (201 lb) (11/26/24 0231)    Daily Weight  11/27/24 : 91.2 kg (201 lb)    Image Results  ECG 12 Lead  Normal sinus rhythm  Normal ECG  When compared with ECG of 29-OCT-2024 14:33,  No significant change was found  See ED provider note for full interpretation and clinical correlation  Confirmed by Janie Calvo ([id number]) on 11/26/2024 7:25:03 PM  XR chest 1 view  Narrative: Interpreted By:  Rico Bills,   STUDY:  XR CHEST 1 VIEW;  11/26/2024 9:51 am      INDICATION:  Signs/Symptoms:pre op.          COMPARISON:  02/29/2024      ACCESSION NUMBER(S):  [id number]      ORDERING CLINICIAN:  TIAGO LOTT      FINDINGS:  AP radiograph of the chest was provided.          CARDIOMEDIASTINAL SILHOUETTE:  Cardiomediastinal silhouette is normal in size and configuration.      LUNGS:  Lungs are clear.      ABDOMEN:  No remarkable upper abdominal findings.      BONES:  No acute osseous changes.      Impression: 1.  No evidence of acute cardiopulmonary process.              MACRO:  None      Signed by: Rico Bills 11/26/2024 10:04 AM  Dictation workstation:   [id number]  Lower extremity venous duplex right  Narrative: Interpreted By:  Gonzalo Michelle,   STUDY:  VASC US LOWER EXTREMITY VENOUS DUPLEX RIGHT; 11/26/2024 5:41 am      INDICATION:  Signs/Symptoms:Rule out DVT.      COMPARISON:  None.      ACCESSION NUMBER(S):  [id number]      ORDERING CLINICIAN:  TIAGO LOTT      TECHNIQUE:  Vascular ultrasound of the right lower  extremity was performed. Real  time compression views as well as Gray scale, color Doppler and  spectral Doppler waveform analysis was performed.      FINDINGS:  Evaluation of the visualized portions of the right common femoral  vein, proximal, mid, and distal femoral vein, and popliteal vein were  performed.  Evaluation of the visualized portions of the posterior  tibial and peroneal veins were also performed.  In addition,  evaluation of the contralateral common femoral vein was performed.      Limitations: None      The evaluated veins demonstrate normal compressibility. There is  intact venous flow demonstrating normal respiratory variability and  normal augmentation of flow with calf compression. Therefore, there  is no ultrasonographic evidence for deep vein thrombosis within the  evaluated veins. No evidence of thrombus is seen within the  contralateral common femoral vein.      There is a 6.8 x 1.3 x 3.6 cm fluid collection within the right  popliteal fossa, consistent with a Baker's cyst.      Impression: No sonographic evidence for deep vein thrombosis within the evaluated  veins of the right lower extremity.      Baker's cyst within the right popliteal fossa, as described above.      MACRO:  None      Signed by: Gonzalo Michelle 11/26/2024 5:57 AM  Dictation workstation:   [id number]      Physical Exam    Vitals reviewed.   Constitutional:       Appearance: Normal appearance.   HENT:      Head: Normocephalic.     Eyes:      Extraocular Movements: Extraocular movements intact.      Conjunctiva/sclera: Conjunctivae normal.      Pupils: Pupils are equal, round, and reactive to light.   Cardiovascular:      Rate and Rhythm: Normal rate and regular rhythm.      Pulses: Normal pulses.      Heart sounds: Normal heart sounds, S1 normal and S2 normal.   Pulmonary:      Effort: Pulmonary effort is normal.      Breath sounds: Normal breath sounds and air entry.   Abdominal:      General: Abdomen is flat. Bowel sounds  are normal.      Palpations: Abdomen is soft.   Skin:     General: Skin is warm and dry.   Neurological:      General: No focal deficit present.      Mental Status: He is alert and oriented to person, place, and time. Mental status is at baseline.   Psychiatric:         Attention and Perception: Attention normal.         Mood and Affect: Mood normal.         Speech: Speech normal.   Relevant Results               Assessment/Plan                  Assessment & Plan  Pyogenic arthritis of right knee joint (Multi)    Pyogenic arthritis of right knee joint, due to unspecified organism (Multi)            Mr. Lynn is a 54-year-old male with PMHx: CAD, MI 2008, HTN, T2DM, gout, depression, GERD, arthritis, seizures who presented to the ED with complaint of right knee pain and swelling.  States symptoms started on Friday denies any injury.  Was seen by Ortho down in the ED for joint aspiration.  Right knee showed a small knee joint effusion and CXR was negative for acute cardiopulmonary process.  S/p R knee I&D 11/27.   -1 R knee hemovac drain in place, not sutured in. Ortho to manage drain. He has Soft dressing nylons, xeroform, fluffs, webril, ace (can be reinforced by nursing as needed). Ortho took intra-op cultures, consulted ID. PT/OT. WBAT RLE. DVT ppx with Lovenox   -Lab reviewed   -Likely Gout flare vs CPPD  -Arthrocentesis: Tech called.  She thinks there are RARE  CPPD crystals.  They can't update report until reviewed by Pathology Staff to confirm.  NO abx, , wait for final OR cultures,       -Given PO 1.8 colchicine.     -Dispo SNF               Assessment/Plan:     #Gout flare vs CPPD  #S/p R knee I&D 11/27  -Ortho consulted and appreciate recs     CAD-MI 2008  HTN  HLD  -Continue home amlodipine 5 mg daily  -Continue home atorvastatin 40 mg daily  -Continue hydrochlorothiazide 12.5 mg daily     T2DM  -Hold home Trulicity and metformin  -Moderate Humalog SSI 3 times daily and at bedtime while in the  hospital  -A1c 5.2  11/26/24  Gout  -Continue home allopurinol 400 mg daily     COPD  -Continue home Symbicort as Breo Ellipta     GERD  -Continue home Protonix 40 mg daily     Depression  -Continue home trazodone 100 mg nightly        Fluids: monitor and replete as needed  Electrolytes: monitor and replete as needed  Nutrition: Regular diet   GI prophylaxis: Protonix 40mg QD  DVT prophylaxis: SCD  Code Status: Full code      Disposition:  SNF      50 min             Betty Mahoney MD

## 2024-11-29 ENCOUNTER — TELEPHONE (OUTPATIENT)
Dept: PRIMARY CARE | Facility: CLINIC | Age: 54
End: 2024-11-29
Payer: MEDICARE

## 2024-11-29 LAB
ALBUMIN SERPL BCP-MCNC: 3.6 G/DL (ref 3.4–5)
ANION GAP SERPL CALC-SCNC: 13 MMOL/L (ref 10–20)
BACTERIA SPEC CULT: NORMAL
BACTERIA SPEC CULT: NORMAL
BASOPHILS # BLD AUTO: 0.04 X10*3/UL (ref 0–0.1)
BASOPHILS NFR BLD AUTO: 0.5 %
BUN SERPL-MCNC: 20 MG/DL (ref 6–23)
CALCIUM SERPL-MCNC: 8.7 MG/DL (ref 8.6–10.6)
CHLORIDE SERPL-SCNC: 104 MMOL/L (ref 98–107)
CO2 SERPL-SCNC: 26 MMOL/L (ref 21–32)
CREAT SERPL-MCNC: 1.11 MG/DL (ref 0.5–1.3)
EGFRCR SERPLBLD CKD-EPI 2021: 80 ML/MIN/1.73M*2
EOSINOPHIL # BLD AUTO: 0.14 X10*3/UL (ref 0–0.7)
EOSINOPHIL NFR BLD AUTO: 1.6 %
ERYTHROCYTE [DISTWIDTH] IN BLOOD BY AUTOMATED COUNT: 13.4 % (ref 11.5–14.5)
GLUCOSE BLD MANUAL STRIP-MCNC: 100 MG/DL (ref 74–99)
GLUCOSE BLD MANUAL STRIP-MCNC: 139 MG/DL (ref 74–99)
GLUCOSE BLD MANUAL STRIP-MCNC: 139 MG/DL (ref 74–99)
GLUCOSE BLD MANUAL STRIP-MCNC: 223 MG/DL (ref 74–99)
GLUCOSE SERPL-MCNC: 111 MG/DL (ref 74–99)
GRAM STN SPEC: NORMAL
HCT VFR BLD AUTO: 38 % (ref 41–52)
HGB BLD-MCNC: 13.1 G/DL (ref 13.5–17.5)
HIV 1+2 AB+HIV1 P24 AG SERPL QL IA: NONREACTIVE
IMM GRANULOCYTES # BLD AUTO: 0.02 X10*3/UL (ref 0–0.7)
IMM GRANULOCYTES NFR BLD AUTO: 0.2 % (ref 0–0.9)
LYMPHOCYTES # BLD AUTO: 2.16 X10*3/UL (ref 1.2–4.8)
LYMPHOCYTES NFR BLD AUTO: 24.5 %
MCH RBC QN AUTO: 30.4 PG (ref 26–34)
MCHC RBC AUTO-ENTMCNC: 34.5 G/DL (ref 32–36)
MCV RBC AUTO: 88 FL (ref 80–100)
MONOCYTES # BLD AUTO: 0.74 X10*3/UL (ref 0.1–1)
MONOCYTES NFR BLD AUTO: 8.4 %
NEUTROPHILS # BLD AUTO: 5.73 X10*3/UL (ref 1.2–7.7)
NEUTROPHILS NFR BLD AUTO: 64.8 %
NRBC BLD-RTO: 0 /100 WBCS (ref 0–0)
PATH REVIEW-CRYSTALS: NORMAL
PHOSPHATE SERPL-MCNC: 3.1 MG/DL (ref 2.5–4.9)
PLATELET # BLD AUTO: 232 X10*3/UL (ref 150–450)
POTASSIUM SERPL-SCNC: 4.3 MMOL/L (ref 3.5–5.3)
RBC # BLD AUTO: 4.31 X10*6/UL (ref 4.5–5.9)
SODIUM SERPL-SCNC: 139 MMOL/L (ref 136–145)
TREPONEMA PALLIDUM IGG+IGM AB [PRESENCE] IN SERUM OR PLASMA BY IMMUNOASSAY: NONREACTIVE
WBC # BLD AUTO: 8.8 X10*3/UL (ref 4.4–11.3)

## 2024-11-29 PROCEDURE — 36415 COLL VENOUS BLD VENIPUNCTURE: CPT | Performed by: STUDENT IN AN ORGANIZED HEALTH CARE EDUCATION/TRAINING PROGRAM

## 2024-11-29 PROCEDURE — 2500000002 HC RX 250 W HCPCS SELF ADMINISTERED DRUGS (ALT 637 FOR MEDICARE OP, ALT 636 FOR OP/ED): Performed by: STUDENT IN AN ORGANIZED HEALTH CARE EDUCATION/TRAINING PROGRAM

## 2024-11-29 PROCEDURE — 97535 SELF CARE MNGMENT TRAINING: CPT | Mod: GO

## 2024-11-29 PROCEDURE — 97110 THERAPEUTIC EXERCISES: CPT | Mod: GP,CQ

## 2024-11-29 PROCEDURE — 2500000004 HC RX 250 GENERAL PHARMACY W/ HCPCS (ALT 636 FOR OP/ED): Performed by: STUDENT IN AN ORGANIZED HEALTH CARE EDUCATION/TRAINING PROGRAM

## 2024-11-29 PROCEDURE — 85025 COMPLETE CBC W/AUTO DIFF WBC: CPT | Performed by: STUDENT IN AN ORGANIZED HEALTH CARE EDUCATION/TRAINING PROGRAM

## 2024-11-29 PROCEDURE — 97165 OT EVAL LOW COMPLEX 30 MIN: CPT | Mod: GO

## 2024-11-29 PROCEDURE — 2500000001 HC RX 250 WO HCPCS SELF ADMINISTERED DRUGS (ALT 637 FOR MEDICARE OP): Performed by: STUDENT IN AN ORGANIZED HEALTH CARE EDUCATION/TRAINING PROGRAM

## 2024-11-29 PROCEDURE — 82947 ASSAY GLUCOSE BLOOD QUANT: CPT

## 2024-11-29 PROCEDURE — 94640 AIRWAY INHALATION TREATMENT: CPT

## 2024-11-29 PROCEDURE — 87389 HIV-1 AG W/HIV-1&-2 AB AG IA: CPT | Performed by: STUDENT IN AN ORGANIZED HEALTH CARE EDUCATION/TRAINING PROGRAM

## 2024-11-29 PROCEDURE — 99233 SBSQ HOSP IP/OBS HIGH 50: CPT | Performed by: STUDENT IN AN ORGANIZED HEALTH CARE EDUCATION/TRAINING PROGRAM

## 2024-11-29 PROCEDURE — 86780 TREPONEMA PALLIDUM: CPT | Performed by: STUDENT IN AN ORGANIZED HEALTH CARE EDUCATION/TRAINING PROGRAM

## 2024-11-29 PROCEDURE — 97116 GAIT TRAINING THERAPY: CPT | Mod: GP,CQ

## 2024-11-29 PROCEDURE — 80069 RENAL FUNCTION PANEL: CPT | Performed by: STUDENT IN AN ORGANIZED HEALTH CARE EDUCATION/TRAINING PROGRAM

## 2024-11-29 PROCEDURE — 1100000001 HC PRIVATE ROOM DAILY

## 2024-11-29 PROCEDURE — 97530 THERAPEUTIC ACTIVITIES: CPT | Mod: GP,CQ

## 2024-11-29 PROCEDURE — 87491 CHLMYD TRACH DNA AMP PROBE: CPT | Performed by: STUDENT IN AN ORGANIZED HEALTH CARE EDUCATION/TRAINING PROGRAM

## 2024-11-29 PROCEDURE — 97530 THERAPEUTIC ACTIVITIES: CPT | Mod: GO

## 2024-11-29 RX ORDER — AMLODIPINE BESYLATE 10 MG/1
10 TABLET ORAL DAILY
Start: 2024-11-30

## 2024-11-29 RX ORDER — ISOSORBIDE MONONITRATE 30 MG/1
60 TABLET, EXTENDED RELEASE ORAL DAILY
Status: DISCONTINUED | OUTPATIENT
Start: 2024-11-30 | End: 2024-12-02 | Stop reason: HOSPADM

## 2024-11-29 RX ORDER — ISOSORBIDE MONONITRATE 30 MG/1
30 TABLET, EXTENDED RELEASE ORAL DAILY
Start: 2024-11-30 | End: 2024-12-02

## 2024-11-29 RX ORDER — COLCHICINE 0.6 MG/1
0.6 TABLET ORAL DAILY
Start: 2024-11-29 | End: 2024-12-09 | Stop reason: SDUPTHER

## 2024-11-29 RX ORDER — ACETAMINOPHEN 325 MG/1
975 TABLET ORAL EVERY 8 HOURS PRN
Start: 2024-11-29

## 2024-11-29 RX ORDER — AMOXICILLIN 250 MG
2 CAPSULE ORAL 2 TIMES DAILY
Start: 2024-11-29

## 2024-11-29 RX ORDER — HYDROXYZINE HYDROCHLORIDE 25 MG/1
25 TABLET, FILM COATED ORAL EVERY 6 HOURS PRN
Start: 2024-11-29

## 2024-11-29 RX ORDER — OXYCODONE HYDROCHLORIDE 5 MG/1
10 TABLET ORAL EVERY 4 HOURS PRN
Status: DISCONTINUED | OUTPATIENT
Start: 2024-11-29 | End: 2024-12-02

## 2024-11-29 RX ORDER — COLCHICINE 0.6 MG/1
0.6 TABLET ORAL DAILY
Status: DISCONTINUED | OUTPATIENT
Start: 2024-11-29 | End: 2024-12-02 | Stop reason: HOSPADM

## 2024-11-29 RX ORDER — OXYCODONE HYDROCHLORIDE 10 MG/1
10 TABLET ORAL EVERY 4 HOURS PRN
Qty: 15 TABLET | Refills: 0 | Status: SHIPPED | OUTPATIENT
Start: 2024-11-29 | End: 2024-12-02

## 2024-11-29 RX ORDER — ALLOPURINOL 200 MG/1
400 TABLET ORAL DAILY
Start: 2024-11-30

## 2024-11-29 ASSESSMENT — COGNITIVE AND FUNCTIONAL STATUS - GENERAL
MOVING TO AND FROM BED TO CHAIR: A LITTLE
HELP NEEDED FOR BATHING: A LOT
HELP NEEDED FOR BATHING: A LITTLE
DAILY ACTIVITIY SCORE: 18
TOILETING: A LOT
TOILETING: A LITTLE
TURNING FROM BACK TO SIDE WHILE IN FLAT BAD: A LOT
STANDING UP FROM CHAIR USING ARMS: A LITTLE
MOBILITY SCORE: 14
MOVING FROM LYING ON BACK TO SITTING ON SIDE OF FLAT BED WITH BEDRAILS: A LITTLE
DRESSING REGULAR UPPER BODY CLOTHING: A LITTLE
MOBILITY SCORE: 18
MOVING TO AND FROM BED TO CHAIR: A LITTLE
MOVING FROM LYING ON BACK TO SITTING ON SIDE OF FLAT BED WITH BEDRAILS: A LITTLE
PERSONAL GROOMING: A LITTLE
EATING MEALS: A LITTLE
CLIMB 3 TO 5 STEPS WITH RAILING: A LITTLE
WALKING IN HOSPITAL ROOM: A LITTLE
MOVING TO AND FROM BED TO CHAIR: A LITTLE
CLIMB 3 TO 5 STEPS WITH RAILING: A LITTLE
DAILY ACTIVITIY SCORE: 17
PERSONAL GROOMING: A LITTLE
TURNING FROM BACK TO SIDE WHILE IN FLAT BAD: A LITTLE
TOILETING: A LITTLE
CLIMB 3 TO 5 STEPS WITH RAILING: TOTAL
MOBILITY SCORE: 18
WALKING IN HOSPITAL ROOM: A LITTLE
DRESSING REGULAR LOWER BODY CLOTHING: A LITTLE
HELP NEEDED FOR BATHING: A LITTLE
DRESSING REGULAR UPPER BODY CLOTHING: A LITTLE
PERSONAL GROOMING: A LITTLE
DRESSING REGULAR LOWER BODY CLOTHING: A LOT
DAILY ACTIVITIY SCORE: 19
WALKING IN HOSPITAL ROOM: A LITTLE
STANDING UP FROM CHAIR USING ARMS: A LITTLE
MOVING FROM LYING ON BACK TO SITTING ON SIDE OF FLAT BED WITH BEDRAILS: A LITTLE
STANDING UP FROM CHAIR USING ARMS: A LOT
DRESSING REGULAR LOWER BODY CLOTHING: A LITTLE
TURNING FROM BACK TO SIDE WHILE IN FLAT BAD: A LITTLE

## 2024-11-29 ASSESSMENT — PAIN SCALES - GENERAL
PAINLEVEL_OUTOF10: 10 - WORST POSSIBLE PAIN
PAINLEVEL_OUTOF10: 10 - WORST POSSIBLE PAIN
PAINLEVEL_OUTOF10: 6
PAINLEVEL_OUTOF10: 7
PAINLEVEL_OUTOF10: 8
PAINLEVEL_OUTOF10: 8
PAINLEVEL_OUTOF10: 5 - MODERATE PAIN
PAINLEVEL_OUTOF10: 10 - WORST POSSIBLE PAIN

## 2024-11-29 ASSESSMENT — PAIN - FUNCTIONAL ASSESSMENT
PAIN_FUNCTIONAL_ASSESSMENT: 0-10

## 2024-11-29 ASSESSMENT — PAIN DESCRIPTION - LOCATION
LOCATION: HEAD
LOCATION: KNEE
LOCATION: HEAD

## 2024-11-29 ASSESSMENT — ACTIVITIES OF DAILY LIVING (ADL)
BATHING_ASSISTANCE: MODERATE
HOME_MANAGEMENT_TIME_ENTRY: 13

## 2024-11-29 ASSESSMENT — PAIN DESCRIPTION - DESCRIPTORS: DESCRIPTORS: ACHING

## 2024-11-29 ASSESSMENT — PAIN DESCRIPTION - ORIENTATION: ORIENTATION: RIGHT

## 2024-11-29 ASSESSMENT — PAIN SCALES - PAIN ASSESSMENT IN ADVANCED DEMENTIA (PAINAD): TOTALSCORE: MEDICATION (SEE MAR)

## 2024-11-29 NOTE — PROGRESS NOTES
Tip Lynn is a 54 y.o. male on day 3 of admission presenting with Pyogenic arthritis of right knee joint (Multi).      Subjective   No events        Objective     Last Recorded Vitals  BP (!) 166/97   Pulse 101   Temp 37 °C (98.6 °F)   Resp 17   Wt 91.2 kg (201 lb)   SpO2 94%   Intake/Output last 3 Shifts:    Intake/Output Summary (Last 24 hours) at 11/29/2024 1635  Last data filed at 11/29/2024 0659  Gross per 24 hour   Intake --   Output 315 ml   Net -315 ml       Admission Weight  Weight: 91.2 kg (201 lb) (11/26/24 0231)    Daily Weight  11/27/24 : 91.2 kg (201 lb)    Image Results  ECG 12 Lead  Normal sinus rhythm  Normal ECG  When compared with ECG of 29-OCT-2024 14:33,  No significant change was found  See ED provider note for full interpretation and clinical correlation  Confirmed by Janie Calvo ([id number]) on 11/26/2024 7:25:03 PM  XR chest 1 view  Narrative: Interpreted By:  Rico Bills,   STUDY:  XR CHEST 1 VIEW;  11/26/2024 9:51 am      INDICATION:  Signs/Symptoms:pre op.          COMPARISON:  02/29/2024      ACCESSION NUMBER(S):  [id number]      ORDERING CLINICIAN:  TIAGO LOTT      FINDINGS:  AP radiograph of the chest was provided.          CARDIOMEDIASTINAL SILHOUETTE:  Cardiomediastinal silhouette is normal in size and configuration.      LUNGS:  Lungs are clear.      ABDOMEN:  No remarkable upper abdominal findings.      BONES:  No acute osseous changes.      Impression: 1.  No evidence of acute cardiopulmonary process.              MACRO:  None      Signed by: Rico Bills 11/26/2024 10:04 AM  Dictation workstation:   [id number]  Lower extremity venous duplex right  Narrative: Interpreted By:  Gonzalo Michelle,   STUDY:  VASC US LOWER EXTREMITY VENOUS DUPLEX RIGHT; 11/26/2024 5:41 am      INDICATION:  Signs/Symptoms:Rule out DVT.      COMPARISON:  None.      ACCESSION NUMBER(S):  [id number]      ORDERING CLINICIAN:  TIAGO LOTT      TECHNIQUE:  Vascular ultrasound of the right lower  extremity was performed. Real  time compression views as well as Gray scale, color Doppler and  spectral Doppler waveform analysis was performed.      FINDINGS:  Evaluation of the visualized portions of the right common femoral  vein, proximal, mid, and distal femoral vein, and popliteal vein were  performed.  Evaluation of the visualized portions of the posterior  tibial and peroneal veins were also performed.  In addition,  evaluation of the contralateral common femoral vein was performed.      Limitations: None      The evaluated veins demonstrate normal compressibility. There is  intact venous flow demonstrating normal respiratory variability and  normal augmentation of flow with calf compression. Therefore, there  is no ultrasonographic evidence for deep vein thrombosis within the  evaluated veins. No evidence of thrombus is seen within the  contralateral common femoral vein.      There is a 6.8 x 1.3 x 3.6 cm fluid collection within the right  popliteal fossa, consistent with a Baker's cyst.      Impression: No sonographic evidence for deep vein thrombosis within the evaluated  veins of the right lower extremity.      Baker's cyst within the right popliteal fossa, as described above.      MACRO:  None      Signed by: Gonzalo Michelle 11/26/2024 5:57 AM  Dictation workstation:   [id number]      Physical Exam    Vitals reviewed.   Constitutional:       Appearance: Normal appearance.   HENT:      Head: Normocephalic.     Eyes:      Extraocular Movements: Extraocular movements intact.      Conjunctiva/sclera: Conjunctivae normal.      Pupils: Pupils are equal, round, and reactive to light.   Cardiovascular:      Rate and Rhythm: Normal rate and regular rhythm.      Pulses: Normal pulses.      Heart sounds: Normal heart sounds, S1 normal and S2 normal.   Pulmonary:      Effort: Pulmonary effort is normal.      Breath sounds: Normal breath sounds and air entry.   Abdominal:      General: Abdomen is flat. Bowel sounds  are normal.      Palpations: Abdomen is soft.   Skin:     General: Skin is warm and dry.   Neurological:      General: No focal deficit present.      Mental Status: He is alert and oriented to person, place, and time. Mental status is at baseline.   Psychiatric:         Attention and Perception: Attention normal.         Mood and Affect: Mood normal.         Speech: Speech normal.   Relevant Results               Assessment/Plan                  Assessment & Plan  Pyogenic arthritis of right knee joint (Multi)    Pyogenic arthritis of right knee joint, due to unspecified organism (Multi)            Mr. Lynn is a 54-year-old male with PMHx: CAD, MI 2008, HTN, T2DM, gout, depression, GERD, arthritis, seizures who presented to the ED with complaint of right knee pain and swelling, seen by Ortho down in the ED for joint aspiration. Right knee showed a small knee joint effusion and CXR was negative for acute cardiopulmonary process.  S/p R knee I&D 11/27. R knee hemovac drain was removed 11/29. No concern for septic arthritis. OR cultures are negative to date. Synovial fluid sent to lab for crystal examination, initial crystal examination was negative. No need for antibiotics. WBAT RLE. DVT ppx with Lovenox. Treating for CPPD vs gout flare with colchicine 0.6 mg daily until flare resolves. Please follow up on STD screen test sent 11/29. Pt is medically clear for SNF at this point.          #Right knee with CPPD vs gout flare  #Acute right knee swelling and pain, pain is controlled.   #S/p R knee I&D 11/27                   CAD-MI 2008  HTN  HLD   -Held hydrochlorothiazide 12.5 mg daily     T2DM  -Hold home Trulicity and metformin  -Moderate Humalog SSI 3 times daily and at bedtime while in the hospital  -A1c 5.2  11/26/24  Hx of   Gout  -Continue home allopurinol 400 mg daily     COPD  -Continue home Symbicort as Breo Ellipta     GERD  -Continue home Protonix 40 mg daily     Depression  -Continue home trazodone 100 mg  nightly        Fluids: monitor and replete as needed  Electrolytes: monitor and replete as needed  Nutrition: Regular diet   GI prophylaxis: Protonix 40mg QD  DVT prophylaxis: SCD  Code Status: Full code      Disposition:  SNF      50 min             Betty Mahoney MD

## 2024-11-29 NOTE — PROGRESS NOTES
"Orthopaedic Surgery Progress Note    Subjective: NAEO. Patient pain improving. Denies CP/SOB/N/v. Lab addended fluid crystal read yesterday afternoon to be positive for CPPD crystals.   Objective:  BP (!) 160/93   Pulse 89   Temp 36.7 °C (98.1 °F)   Resp 16   Ht 1.803 m (5' 11\")   Wt 91.2 kg (201 lb)   SpO2 95%   BMI 28.03 kg/m²     Gen: arousable, NAD, appropriately conversational  Cardiac: RRR to peripheral palpation  Resp: nonlabored on RA  GI: soft, nondistended    MSK:  RLE:   - RLE knee drain in place, holding suction   - Post-operative dressing in place without strikethrough bleeding.   -wiggles toes  -Foot wwp, 2+ DP/PT pulse, brisk cap refill  -Compartments soft and compressible    Assessment/Plan: 54 y.o. male s/p I and D R knee on 11/27 with Dr. Pittman.      Plan:  - Weight bearing: WBAT RLE  - DVT ppx: SCDs, pp, recommend and least 6 weeks DVT ppx  - Diet: Regular okay from orthopaedic perspective  - Pain: pp  - Antibiotics: per ID recs  - Treat CPPD crystal arthropathy   -ID consult: appreciate recs  - Bowel Regimen: rec aggressive bowel regimen  - PT/OT  - Drain: hemovac x1 RLE knee- please record drain output q8 hours  -dressing: nylons, xeroform, fluffs, webril, ace  --- primary/nursing can reinforce dressing as needed.    Plan discussed with attending. Recommendations not finalized until note signed by attending.    Sohail Tejada MD  Orthopaedic Surgery PGY-1  Available by Epic Chat    While admitted, this patient will be followed by the Ortho Trauma Team, available via Epic Chat weekdays 6a-6p. Please page [phone] on nights and weekends.    Ortho Trauma  First Call: Kianna Dia, PGY-1  Second Call: Sohail Tejada, PGY-2  Third Call: Ramin Santana, PGY-3      "

## 2024-11-29 NOTE — PROGRESS NOTES
Physical Therapy    Physical Therapy Treatment    Patient Name: Tip Lynn  MRN: [id number]  Department: [facility] [place] [address]  Room: [address] [place] [address]  Today's Date: 11/29/2024  Time Calculation  Start Time: 1409  Stop Time: 1448  Time Calculation (min): 39 min         Assessment/Plan   PT Assessment  End of Session Communication: Bedside nurse  Assessment Comment: Pt tolerated PT session well, able to tolerated bed level exercises and transfer to bedside chair with FWW and cues for sequencing. Pt continues to remain appropriate for High intensity PT upon D/C from hospital.  End of Session Patient Position: Up in chair, Alarm off, not on at start of session  PT Plan  Inpatient/Swing Bed or Outpatient: Inpatient  PT Plan  Treatment/Interventions: Bed mobility, Gait training, Balance training, Transfer training, Strengthening, Range of motion, Endurance training, Therapeutic activity, Therapeutic exercise  PT Plan: Ongoing PT  PT Frequency: 5 times per week  PT Discharge Recommendations: High intensity level of continued care  Equipment Recommended upon Discharge:  (TBD)  PT Recommended Transfer Status: Assist x1, Assistive device  PT - OK to Discharge: Yes (meaning pt seen and dc rec made)      General Visit Information:   PT  Visit  PT Received On: 11/29/24  General  Missed Visit Reason:  (n/a)  Prior to Session Communication: Bedside nurse  Patient Position Received: Bed, 3 rail up, Alarm off, not on at start of session  General Comment: Pt supine, agreeable to work with PT. Eager to try to get to recliner today.    Subjective   Precautions:  Precautions  LE Weight Bearing Status: Weight Bearing as Tolerated (RLE)  Medical Precautions: Fall precautions    Vital Signs (Past 2hrs)        Date/Time Vitals Session Patient Position Pulse Resp SpO2 BP MAP (mmHg)    11/29/24 1409 During PT  --  109  --  --  --  --     11/29/24 14:26:30 --  --  101  17  94 %  166/97  120                         Objective   Pain:  Pain Assessment  Pain  Assessment: 0-10  0-10 (Numeric) Pain Score: 10 - Worst possible pain  Pain Type: Acute pain  Pain Location: Leg  Pain Orientation: Right  Cognition:  Cognition  Overall Cognitive Status: Within Functional Limits  Orientation Level: Oriented X4    Activity Tolerance:  Activity Tolerance  Endurance: Tolerates 10 - 20 min exercise with multiple rests  Treatments:  Therapeutic Exercise  Therapeutic Exercise Performed: Yes  Therapeutic Exercise Activity 1: Supine: AP, QS, HS, Hip ABD x10 BLE (AAROM for HS and Hip ABD .)    Bed Mobility  Bed Mobility: Yes  Bed Mobility 1  Bed Mobility 1: Supine to sitting  Level of Assistance 1: Moderate assistance, Minimal verbal cues  Bed Mobility Comments 1: Assist with RLE to advance OOB    Ambulation/Gait Training  Ambulation/Gait Training Performed: Yes  Ambulation/Gait Training 1  Surface 1: Level tile  Device 1: Rolling walker  Assistance 1: Minimum assistance, Minimal verbal cues  Quality of Gait 1: Diminished heel strike, Inconsistent stride length, Shuffling gait, Decreased step length, Antalgic (Decreased chris, decreased endurance, unsteady)  Comments/Distance (ft) 1: 4ft, demo and verbal cues for sequencing with FWW and WBAT status.    Transfers  Transfer: Yes  Transfer 1  Transfer From 1: Bed to  Transfer to 1: Stand  Technique 1: Sit to stand  Transfer Device 1: Walker  Transfer Level of Assistance 1: Moderate assistance, Minimal verbal cues  Trials/Comments 1: x1 trial, elevated bed height, cues for hand placement and hand/foot sequencing.  Transfers 2  Transfer From 2: Stand to  Transfer to 2: Chair with arms  Technique 2: Stand to sit  Transfer Device 2: Walker  Transfer Level of Assistance 2: Moderate assistance, Minimal verbal cues  Trials/Comments 2: x1 trial, cues for kicking out RLE prior to sitting, with safe hand placement and assist for slow and controlled descent to seated position.    Stairs  Stairs: No    Outcome Measures:  [facility] Basic Mobility  Turning  from your back to your side while in a flat bed without using bedrails: A little  Moving from lying on your back to sitting on the side of a flat bed without using bedrails: A lot  Moving to and from bed to chair (including a wheelchair): A little  Standing up from a chair using your arms (e.g. wheelchair or bedside chair): A lot  To walk in hospital room: A little  Climbing 3-5 steps with railing: Total  Basic Mobility - Total Score: 14    Education Documentation  Precautions, taught by Isaura Garcia PTA at 11/29/2024  3:12 PM.  Learner: Patient  Readiness: Acceptance  Method: Explanation  Response: Verbalizes Understanding, Needs Reinforcement  Comment: WBAT WBing status, sequencing with FWW,    Body Mechanics, taught by Isaura Garcia PTA at 11/29/2024  3:12 PM.  Learner: Patient  Readiness: Acceptance  Method: Explanation  Response: Verbalizes Understanding, Needs Reinforcement  Comment: WBAT WBing status, sequencing with FWW,    Mobility Training, taught by Isaura Garcia PTA at 11/29/2024  3:12 PM.  Learner: Patient  Readiness: Acceptance  Method: Explanation  Response: Verbalizes Understanding, Needs Reinforcement  Comment: WBAT WBing status, sequencing with FWW,    Education Comments  No comments found.        OP EDUCATION:       Encounter Problems       Encounter Problems (Active)       PT Problem       Patient will ambulate >80' with LRAD and Modified Independent  (Progressing)       Start:  11/28/24    Expected End:  12/12/24            Patient will perform sit<>stand transfer with LRAD, and Modified Independent  (Progressing)       Start:  11/28/24    Expected End:  12/12/24            Patient will complete supine to sit and sit to supine Independent  (Progressing)       Start:  11/28/24    Expected End:  12/12/24            Pt will demo static/dynamic standing with LRAD and IND x 2 min with 0 LOB (Progressing)       Start:  11/28/24    Expected End:  12/12/24 11/29/24 at 3:14  PM   Isaura Garcia, hospitals   Rehab Office: [phone]

## 2024-11-29 NOTE — CARE PLAN
The patient's goals for the shift include      The clinical goals for the shift include adequate pain management

## 2024-11-29 NOTE — TELEPHONE ENCOUNTER
Patient called in and stated that he's been in a lot of pain and wanted to let Dr. Weston know he was admitted int the ED on 11/26/2024. Patient would like if Dr. Weston would give him a call. Thanks!

## 2024-11-29 NOTE — PROGRESS NOTES
Occupational Therapy    Evaluation    Patient Name: Tip Lynn  MRN: [id number]  Today's Date: 11/29/2024  Room: [address] [place], [place] [address]  Time Calculation  Start Time: 1210  Stop Time: 1248  Time Calculation (min): 38 min    Assessment  IP OT Assessment  OT Assessment: FUNCTIONAL PERFORMANCE OF ADL, IADL, TRANSFER, AND MOBILTY IS LIMITED 2/2 PAIN, DECREASED STRENGTH, AND BALANCE. IP OT TO MAXIMIZE RETURN TO PLOF.  Prognosis: Good  Barriers to Discharge: None  Evaluation/Treatment Tolerance: Patient limited by pain, Patient limited by fatigue  Medical Staff Made Aware: Yes  End of Session Communication: PCT/NA/CTA, Bedside nurse  End of Session Patient Position: Bed, 3 rail up, Alarm off, caregiver present  Plan:  Inpatient Plan  Treatment Interventions: ADL retraining, Functional transfer training, UE strengthening/ROM, Endurance training, Patient/family training, Equipment evaluation/education, Compensatory technique education  OT Frequency: 3 times per week  OT Discharge Recommendations: High intensity level of continued care  Equipment Recommended upon Discharge:  (TBD AT NEXT LEVEL OF CARE)  OT Recommended Transfer Status: Assist of 1  OT - OK to Discharge: Yes  OT Assessment  OT Assessment Results: Decreased ADL status, Decreased endurance, Decreased functional mobility, Decreased IADLs  Prognosis: Good  Barriers to Discharge: None  Evaluation/Treatment Tolerance: Patient limited by pain, Patient limited by fatigue  Medical Staff Made Aware: Yes  Strengths: Housing layout, Ability to acquire knowledge, Attitude of self  Barriers to Participation: Comorbidities    Subjective   Current Problem:  1. Pyogenic arthritis of right knee joint, due to unspecified organism (Multi)  Case Request Operating Room: Debridement Knee    Case Request Operating Room: Debridement Knee    Tissue/Wound Culture/Smear    Tissue/Wound Culture/Smear    Tissue/Wound Culture/Smear    Tissue/Wound Culture/Smear    Tissue/Wound Culture/Smear     Tissue/Wound Culture/Smear    acetaminophen (Tylenol) 325 mg tablet    allopurinol 200 mg tablet    amLODIPine (Norvasc) 10 mg tablet    colchicine 0.6 mg tablet    hydrOXYzine HCL (Atarax) 25 mg tablet    isosorbide mononitrate ER (Imdur) 30 mg 24 hr tablet    oxyCODONE (Roxicodone) 10 mg immediate release tablet    sennosides-docusate sodium (Charlene-Colace) 8.6-50 mg tablet    Referral to Primary Care - Family Practice      2. Calcium pyrophosphate deposition disease  Referral to Rheumatology    Referral to Orthopaedic Surgery        General:  Reason for Referral: 54 y.o. male presented to the ED with right knee pain and swelling. S/p I and D R knee on 11/27 with Dr. Pittman.  Past Medical History Relevant to Rehab: CAD, MI 2008, HTN, T2DM, gout, depression, GERD, arthritis, seizures  Prior to Session Communication: Bedside nurse  Family/Caregiver Present: No  General Comment: PATIENT WAS PLEASANT AND RECEPTIVE TO PARTICIPATION. INCREASED TIME REQUIRED FOR TASK INITIATION 2/2 INCREASED ANXIETY.   Precautions:  Hearing/Visual Limitations: WFL/GLASSES  LE Weight Bearing Status: Weight Bearing as Tolerated (RLE)  Medical Precautions: Fall precautions  Vital Signs:  Vital Signs (Past 2hrs)        Date/Time Vitals Session Patient Position Pulse Resp SpO2 BP MAP (mmHg)    11/29/24 1409 During PT  --  109  --  --  --  --     11/29/24 14:26:30 --  --  101  17  94 %  166/97  120                   Pain:  Pain Assessment  Pain Assessment: 0-10  0-10 (Numeric) Pain Score: 10 - Worst possible pain  Pain Type: Acute pain  Pain Location: Leg  Pain Orientation: Right  Pain Interventions: Medication (See MAR), Repositioned  Response to Interventions: Relief  Lines/Tubes/Drains:  Closed/Suction Drain Right Knee Accordion 10 Fr. (Active)   Number of days: 2         Objective   Cognition:  Overall Cognitive Status: Within Functional Limits  Arousal/Alertness: Appropriate responses to stimuli  Orientation Level: Oriented X4  Following  Commands: Follows all commands and directions without difficulty  Attention: Within Functional Limits  Memory: Within Funtional Limits  Safety/Judgement: Within Functional Limits  Insight: Within function limits  Impulsive: Within functional limits   Home Living:  Type of Home: House  Lives With: Dependent children  Home Adaptive Equipment: Walker rolling or standard, Reacher (ROLLATOR)  Home Layout: One level  Home Access: Ramped entrance  Bathroom Shower/Tub: Tub/shower unit  Bathroom Toilet: Standard  Bathroom Equipment: Hand-held shower hose, Tub transfer bench   Prior Function:  Level of [place]: Independent with ADLs and functional transfers, Independent with homemaking with ambulation  Receives Help From: Family (AVAILABLE FOR PRN ASSISTANCE)  Ambulatory Assistance:  (MOD I AT ROLLATOR LEVEL)  Vocational: Unemployed (ACTIVELY SEEKING EMPLOYMENT)  Hand Dominance: Right  IADL History:  Homemaking Responsibilities: Yes  Meal Prep Responsibility: Primary  Laundry Responsibility: Primary  Cleaning Responsibility: Primary  Bill Paying/Finance Responsibility: Primary  Shopping Responsibility: Primary  Leisure and Hobbies: SPORTS, BOWLING, DANCING  IADL Comments: IND MEDICATION MANAGEMENT  ADL:  Eating Assistance: Independent  Grooming Deficit: Setup  Bathing Assistance: Moderate  Bathing Deficit:  (ANTICIPATED)  UE Dressing Assistance: Independent  LE Dressing Assistance: Maximal  Toileting Assistance with Device: Moderate  Activity Tolerance:  Endurance: Tolerates 10 - 20 min exercise with multiple rests  Balance:  Dynamic Standing Balance  Dynamic Standing-Balance Support: Bilateral upper extremity supported  Dynamic Standing-Level of Assistance: Minimum assistance  Dynamic Standing-Balance: Turning (AND SIDE STEPPING AT WH W LEVEL FOR ADL TASKS)  Static Sitting Balance  Static Sitting-Balance Support: Bilateral upper extremity supported, Feet supported  Static Sitting-Level of Assistance: Contact  guard  Static Standing Balance  Static Standing-Balance Support: Bilateral upper extremity supported  Static Standing-Level of Assistance: Minimum assistance (CUES TO ACHIEVE AND MAINTAIN TRUNK EXTENSION AND RLE FOR INCREASED WB)  Bed Mobility/Transfers: Bed Mobility  Bed Mobility: Yes  Bed Mobility 1  Bed Mobility 1: Supine to sitting  Level of Assistance 1: Moderate assistance  Bed Mobility Comments 1: EDUCATED FOR COMPENSATORY TECHNIQUES TO IND  MOBILIZE RLE AND TO DECREASE ANXIETY AND PAIN  Bed Mobility 2  Bed Mobility  2: Sitting to supine (SCOOTING)  Level of Assistance 2: Minimum assistance   and Transfers  Transfer: Yes  Transfer 1  Transfer From 1: Bed to  Transfer to 1: Stand  Transfer Device 1: Walker  Transfer Level of Assistance 1: Moderate assistance  Transfers 2  Transfer From 2: Stand to  Transfer to 2: Bed  Trials/Comments 2: WALKER AND TF TRAINING FOR INCREASED SAFETY  IADL's:   Homemaking Responsibilities: Yes  Meal Prep Responsibility: Primary  Laundry Responsibility: Primary  Cleaning Responsibility: Primary  Bill Paying/Finance Responsibility: Primary  Shopping Responsibility: Primary  Leisure and Hobbies: SPORTS, BOWLING, DANCING  IADL Comments: IND MEDICATION MANAGEMENT  Vision: Vision - Basic Assessment  Current Vision: Wears glasses all the time  Patient Visual Report:  (WFL)   and    Sensation:  Light Touch: No apparent deficits (BUE)  Sensation Comment: HYPERSENSITIVITY NOTED IN RLE  Strength:  Strength Comments: BUE FUNCTIONALLY >/= 4/5  Perception:  Inattention/Neglect: Appears intact  Initiation: Appears intact  Motor Planning: Appears intact  Perseveration: Not present  Coordination:  Movements are Fluid and Coordinated: Yes   Hand Function:  Hand Function  Gross Grasp: Functional  Coordination: Functional  Extremities: RUE   RUE : Within Functional Limits, LUE   LUE: Within Functional Limits,  , and      Outcome Measures: [facility] Daily Activity  Putting on and taking off regular lower  body clothing: A lot  Bathing (including washing, rinsing, drying): A lot  Putting on and taking off regular upper body clothing: None  Toileting, which includes using toilet, bedpan or urinal: A lot  Taking care of personal grooming such as brushing teeth: A little  Eating Meals: None  Daily Activity - Total Score: 17  Brief Confusion Assessment Method (bCAM)  CAM Result: CAM -      ,     OT Adult Other Outcome Measures  4AT: 0    Education Documentation  Body Mechanics, taught by Maricarmen Moses OT at 11/29/2024  3:10 PM.  Learner: Patient  Readiness: Eager  Method: Explanation  Response: Verbalizes Understanding    Precautions, taught by Maricarmen Moses OT at 11/29/2024  3:10 PM.  Learner: Patient  Readiness: Eager  Method: Explanation  Response: Verbalizes Understanding    ADL Training, taught by Maricarmen Moses OT at 11/29/2024  3:10 PM.  Learner: Patient  Readiness: Eager  Method: Explanation  Response: Verbalizes Understanding    Education Comments  No comments found.        Goals:     Encounter Problems       Encounter Problems (Active)       ADLs       Patient will perform LB bathing with modified independent level of assistance and extended tub bench and long-handled sponge.       Start:  11/29/24    Expected End:  12/13/24            Patient with complete lower body dressing with modified independent level of assistance donning and doffing all LE clothes  with PRN adaptive equipment while supported sitting       Start:  11/29/24    Expected End:  12/13/24            Patient will complete daily grooming tasks brushing teeth and washing face/hair with modified independent level of assistance and PRN adaptive equipment while supported sit/standing.       Start:  11/29/24    Expected End:  12/13/24            Patient will complete toileting including hygiene clothing management/hygiene with modified independent level of assistance.       Start:  11/29/24    Expected End:  12/13/24                BALANCE       Patient will tolerate standing for 10 minutes to modified independent level of assistance with least restrictive device in order to improve functional activity tolerance for ADL tasks.       Start:  11/29/24    Expected End:  12/13/24               TRANSFERS       Patient will perform bed mobility modified independent level of assistance in order to improve safety and independence with mobility       Start:  11/29/24    Expected End:  12/13/24            Patient will complete functional transfer to  least restrictive device with modified independent level of assistance.       Start:  11/29/24    Expected End:  12/13/24 11/29/24 at 3:12 PM   Maricarmen Moses, OT   Rehab Office: [phone]

## 2024-11-29 NOTE — PROGRESS NOTES
Met with patient at bedside to discuss PT recommendations for high intensity therapy at discharge. Discussed differences between acute rehab, skilled nursing facility, home health care and outpatient therapy. Patient agreeable to skilled nursing facility. Educated regarding freedom of choice and provided financial disclosure. Facility list provided. Patient asked for referrals to be sent to facilities located in [address] [place] [address]. Referrals sent to [facility], [facility], [facility], [facility] and [facility]. Patient will need precert.   Update 1545: Discussed with patient accepting facilities of [facility], [facility], [facility] and [facility]. Patient states [facility] SN[facility] is facility of choice. Direct submit team asked to submit for precert. 7000 completed in [facility] [place] updated.     Layne BOWLING, RN  Transitional Care Coordinator (TCC)  [phone]

## 2024-11-29 NOTE — CONSULTS
Inpatient consult to Infectious Diseases  Consult performed by: Bj Gustafson MD  Consult ordered by: Betty Mahoney MD      Primary MD: Christ Weston DO    Reason For Consult       Rule out septic right knee    History Of Present Illness  Patient seen during mid afternoon rounds.  Chart reviewed  Patient presented to the emergency department on 11/25/2024 with acute right knee swelling and pain.  Patient does have a history of gout but has been off his gout medications for several months.  Patient denied arthrocentesis at that time but returned on 11/26/2024 with progressive pain.  On 11/26/2024 patient had arthrocentesis and approximately 18 mL of translucent yellow fluid was removed.  On 11/26/2024 right knee fluid showed 44,290 cells (89% PMN).  11/27/2024, patient went to the OR for debridement and intraoperative cultures.    Today I saw the patient.  The patient's daughter and one of his nephews were present for the evaluation and with patient's permission.  Patient was reclining in bed in no acute distress  Had STD checking in July 2024 (negative for HIV, GC and chlamydia) since his then girlfriend had multiple partners.  No longer with this particular partner and no sexual partner in the last several months.  Also patient has had no STI symptoms such as dysuria, drainage, lymph node enlargement, pain or difficulty was urine.  Also patient has had no fever, chills, rigors, peripheral rash.    Patient presented with acute right knee pain and swelling without known antecedent trauma, bacteremia, known indwelling device or other risk factors for occult bacteremia and secondary septic arthritis.    Past Medical History  He has a past medical history of Arthritis, Depression, Diabetes mellitus (Multi), GERD (gastroesophageal reflux disease), Heart disease, Hypertension, Myocardial infarction (Multi), and Seizures (Multi).    Surgical History  He has a past surgical history that includes Ankle surgery  (03/24/2014); Other surgical history (04/25/2019); Rectal polypectomy (06/30/2016); CT angio coronary art with heartflow if score >30% (03/14/2020); and Ankle fracture surgery.     Social History     Occupational History    Not on file   Tobacco Use    Smoking status: Never    Smokeless tobacco: Never   Substance and Sexual Activity    Alcohol use: Never    Drug use: Never    Sexual activity: Defer     Travel History   Travel since 10/28/24    No documented travel since 10/28/24        Family History  Family History   Problem Relation Name Age of Onset    Lung cancer Mother      Heart attack Mother      Coronary artery disease Mother      Coronary artery disease Father       Allergies  Aspirin, Topiramate, Salicylates, Penicillins, and Lidopatch [lidocaine-menthol]     Immunization History   Administered Date(s) Administered    DT (pediatric) 02/16/2005    Flu vaccine (IIV4), preservative free *Check age/dose* 09/22/2016, 12/01/2017, 10/22/2018, 09/21/2020, 02/25/2022    Flu vaccine, quadrivalent, high-dose, preservative free, age 65y+ (FLUZONE) 08/29/2022    Flu vaccine, quadrivalent, no egg protein, age 6 month or greater (FLUCELVAX) 09/16/2017    Flu vaccine, trivalent, preservative free, age 6 months and greater (Fluarix/Fluzone/Flulaval) 11/28/2012    Influenza, Unspecified 02/14/2001, 11/14/2013, 10/15/2015    Influenza, injectable, quadrivalent 11/05/2018    Influenza, seasonal, injectable 10/15/2015    Moderna SARS-CoV-2 Vaccination 04/22/2021, 05/20/2021    PPD Test 06/19/2009, 06/11/2014    Pfizer COVID-19 vaccine, bivalent, age 12 years and older (30 mcg/0.3 mL) 01/05/2023    Pneumococcal polysaccharide vaccine, 23-valent, age 2 years and older (PNEUMOVAX 23) 04/28/2021    Td (adult), unspecified 06/19/2009    Td vaccine, age 7 years and older (TDVAX) 05/15/2008    Tdap vaccine, age 7 year and older (BOOSTRIX, ADACEL) 07/05/2013, 06/18/2014     Medications  Home medications:  Medications Prior to  Admission   Medication Sig Dispense Refill Last Dose/Taking    Descovy 200-25 mg tablet Take 1 tablet by mouth early in the morning..   Taking    acetaminophen (Tylenol) 500 mg tablet Take 2 tablets (1,000 mg) by mouth every 6 hours if needed for mild pain (1 - 3) for up to 10 days. 30 tablet 0     albuterol 90 mcg/actuation inhaler Inhale 1 puff every 6 hours if needed for wheezing or shortness of breath. 18 g 3     allopurinol (Zyloprim) 100 mg tablet TAKE 1 TABLET BY MOUTH ONCE DAILY *TOTAL DAILY DOSE IS 400MG IN COMBINATION WITH 300MG* 30 tablet 10     amLODIPine (Norvasc) 5 mg tablet Take 1 tablet (5 mg) by mouth once daily. As directed       atorvastatin (Lipitor) 40 mg tablet Take 1 tablet (40 mg) by mouth early in the morning..       budesonide-formoteroL (Symbicort) 80-4.5 mcg/actuation inhaler Inhale 2 puffs 2 times a day. Rinse mouth with water after use to reduce aftertaste and incidence of candidiasis. Do not swallow. 10.2 g 2     cyclobenzaprine (Flexeril) 5 mg tablet Take 1 tablet (5 mg) by mouth 2 times a day as needed for muscle spasms.       dulaglutide (Trulicity) 1.5 mg/0.5 mL pen injector injection Inject 1.5 mg under the skin 1 (one) time per week. 12 each 3     hydroCHLOROthiazide (Microzide) 12.5 mg tablet Take 1 tablet (12.5 mg) by mouth once daily.       meloxicam (Mobic) 15 mg tablet TAKE 1 TABLET BY MOUTH ONCE DAILY FOR 15 DAYS (Patient taking differently: Take 1 tablet (15 mg) by mouth once daily as needed.) 15 tablet 10     metFORMIN (Glucophage) 500 mg tablet Take 1 tablet (500 mg) by mouth 2 times daily (morning and late afternoon).       pantoprazole (ProtoNix) 40 mg EC tablet Take 1 tablet (40 mg) by mouth once daily. 30 tablet 1     traZODone (Desyrel) 100 mg tablet Take 1 tablet (100 mg) by mouth once daily at bedtime.        Objective  Range of Vitals (last 24 hours)  Heart Rate:  [85-99]   Temp:  [36.5 °C (97.7 °F)-36.7 °C (98.1 °F)]   Resp:  [16]   BP: (112-160)/(67-93)   SpO2:   [91 %-95 %]   Daily Weight  11/27/24 : 91.2 kg (201 lb)    Body mass index is 28.03 kg/m².     Physical Exam  Supine in bed, no acute distress, head of bed elevated  Pleasant and interactive  Extraocular muscles are intact, clear pharynx, anterolateral chest clear, S1, S2, I did not hear a murmur  Soft nontender abdomen  Musculoskeletal: Right leg and dressing and brace  Patient moves feet without difficulty  Grossly intact sensory exam, intact major muscle exam            Labs  Results from last 72 hours   Lab Units 11/28/24  0542 11/26/24  0343   WBC AUTO x10*3/uL 8.3 7.2   HEMOGLOBIN g/dL 13.7 15.0   HEMATOCRIT % 40.1* 41.6   PLATELETS AUTO x10*3/uL 237 219   NEUTROS PCT AUTO %  --  70.1   LYMPHS PCT AUTO %  --  19.4   MONOS PCT AUTO %  --  9.1   EOS PCT AUTO %  --  0.8     Results from last 72 hours   Lab Units 11/28/24  0542 11/26/24  0343   SODIUM mmol/L 136 136   POTASSIUM mmol/L 4.2 3.8   CHLORIDE mmol/L 100 103   CO2 mmol/L 26 23   BUN mg/dL 17 13   CREATININE mg/dL 1.25 1.02   GLUCOSE mg/dL 149* 115*   CALCIUM mg/dL 9.0 9.1   ANION GAP mmol/L 14 14   EGFR mL/min/1.73m*2 69 88   PHOSPHORUS mg/dL 3.3  --      Results from last 72 hours   Lab Units 11/28/24  0542 11/26/24  0343   ALK PHOS U/L  --  58   BILIRUBIN TOTAL mg/dL  --  0.7   PROTEIN TOTAL g/dL  --  7.6   ALT U/L  --  23   AST U/L  --  20   ALBUMIN g/dL 3.6 4.4     Estimated Creatinine Clearance: 79.9 mL/min (by C-G formula based on SCr of 1.25 mg/dL).  C-Reactive Protein   Date Value Ref Range Status   11/26/2024 4.53 (H) <1.00 mg/dL Final     CRP   Date Value Ref Range Status   10/04/2021 0.60 mg/dL Final     Comment:     REF VALUE  < 1.00     09/21/2020 1.35 (A) mg/dL Final     Comment:     REF VALUE  < 1.00       Sedimentation Rate   Date Value Ref Range Status   11/26/2024 23 (H) 0 - 20 mm/h Final   10/04/2021 24 (H) 0 - 15 mm/h Final   09/21/2020 11 0 - 15 mm/h Final       Microbiology  11/26/24 JOINT FLUID CULTURE NGTD  11/26/24 JOINT FLUID  CULTURE NGTD   11/26/2024 joint fluid crystal exam (CORRECTED report on 11/28/2024)     Identification, Synovial Fluid  No crystals seen by bright-field or first order red axis polarization microscopy. Calcium Pyrophosphate Crystals Abnormal  VC   Comment: Corrected result: Previously reported as No crystals seen by bright-field or first order red axis polarization microscopy. (reference range: No crystals seen by bright-field or first order red axis polarization microscopy.)     Assessment:  Generally healthy 54-year-old male who presents with acute right knee swelling and pain.  Oh had no systemic symptoms and no redness about the knee.  This began abruptly 11/25/2024 and was noted when he arose from sleep.  Patient has a history of gout and has not taken medication in several months.  Patient reports usually having gout and foot and no prior flexion in the right knee.  Went to the ER on 10/11/2025 and declined arthrocentesis but returned on 11/26/2024 for arthrocentesis which was performed by orthopedics.  WBC count was approximately 44,000 and the majority were neutrophils suggesting pyogenic septic arthritis and patient went to the OR on 11/26/2024 for I&D.  Surgical OR samples (knee #1, #2, and #3) were sent and returned culture negative to date.    11/26/2024 synovial fluid sent to lab for crystal examination.  Initial crystal examination was negative.  11/28/2024 CONTACTED HEMATOLOGY LABORATORY TO INQUIRE ABOUT RETESTING SYNOVIAL FLUID FOR CRYSTALS, since patient did not have prior symptoms to suggest infection and secondary septic joint.  Patient had no STD symptoms.  However patient is agreeable to testing.    11/28/2024 I was contacted by the laboratory with submitted an updated report showing that 1126 synovial fluid indeed showed evidence of crystal arthropathy and pathology now reporting the presence of CPPD crystals.  Thus patient has crystal arthropathy status post I&D and no signs of infection.   Therefore would treat CPPD.    Would not administer antibiotics    Right knee with CPPD arthropathy     Clinically did not present like infected septic joint     Repeat crystal exam positive for CPPD     Would treat for CPPD     No antibiotics necessary    Past medical history of gout      Recommendations:  1.  Begin treatment of CPPD crystal arthropathy  2.  No indication for antibacterial antibiotics  3.  Patient amenable to urine screening for GC and chlamydia  4.  Follow-up with orthopedics and PCP  5.  Might benefit from referral to rheumatology for ongoing management of CPPD and possibly gout.    ID WILL SIGN OFF and will not be following  Bj Gustafson MD

## 2024-11-30 LAB
ALBUMIN SERPL BCP-MCNC: 3.5 G/DL (ref 3.4–5)
ANION GAP SERPL CALC-SCNC: 13 MMOL/L (ref 10–20)
BACTERIA SPEC CULT: NORMAL
BASOPHILS # BLD AUTO: 0.03 X10*3/UL (ref 0–0.1)
BASOPHILS NFR BLD AUTO: 0.5 %
BUN SERPL-MCNC: 19 MG/DL (ref 6–23)
C TRACH RRNA SPEC QL NAA+PROBE: NEGATIVE
CALCIUM SERPL-MCNC: 9 MG/DL (ref 8.6–10.6)
CHLORIDE SERPL-SCNC: 99 MMOL/L (ref 98–107)
CO2 SERPL-SCNC: 30 MMOL/L (ref 21–32)
CREAT SERPL-MCNC: 1.08 MG/DL (ref 0.5–1.3)
EGFRCR SERPLBLD CKD-EPI 2021: 83 ML/MIN/1.73M*2
EOSINOPHIL # BLD AUTO: 0.22 X10*3/UL (ref 0–0.7)
EOSINOPHIL NFR BLD AUTO: 3.8 %
ERYTHROCYTE [DISTWIDTH] IN BLOOD BY AUTOMATED COUNT: 13.6 % (ref 11.5–14.5)
GLUCOSE BLD MANUAL STRIP-MCNC: 117 MG/DL (ref 74–99)
GLUCOSE BLD MANUAL STRIP-MCNC: 124 MG/DL (ref 74–99)
GLUCOSE BLD MANUAL STRIP-MCNC: 137 MG/DL (ref 74–99)
GLUCOSE BLD MANUAL STRIP-MCNC: 180 MG/DL (ref 74–99)
GLUCOSE SERPL-MCNC: 104 MG/DL (ref 74–99)
GRAM STN SPEC: NORMAL
GRAM STN SPEC: NORMAL
HCT VFR BLD AUTO: 39.6 % (ref 41–52)
HGB BLD-MCNC: 13.3 G/DL (ref 13.5–17.5)
IMM GRANULOCYTES # BLD AUTO: 0.02 X10*3/UL (ref 0–0.7)
IMM GRANULOCYTES NFR BLD AUTO: 0.3 % (ref 0–0.9)
LYMPHOCYTES # BLD AUTO: 1.99 X10*3/UL (ref 1.2–4.8)
LYMPHOCYTES NFR BLD AUTO: 34.2 %
MCH RBC QN AUTO: 30 PG (ref 26–34)
MCHC RBC AUTO-ENTMCNC: 33.6 G/DL (ref 32–36)
MCV RBC AUTO: 89 FL (ref 80–100)
MONOCYTES # BLD AUTO: 0.67 X10*3/UL (ref 0.1–1)
MONOCYTES NFR BLD AUTO: 11.5 %
N GONORRHOEA DNA SPEC QL PROBE+SIG AMP: NEGATIVE
NEUTROPHILS # BLD AUTO: 2.89 X10*3/UL (ref 1.2–7.7)
NEUTROPHILS NFR BLD AUTO: 49.7 %
NRBC BLD-RTO: 0 /100 WBCS (ref 0–0)
PHOSPHATE SERPL-MCNC: 4.1 MG/DL (ref 2.5–4.9)
PLATELET # BLD AUTO: 239 X10*3/UL (ref 150–450)
POTASSIUM SERPL-SCNC: 3.9 MMOL/L (ref 3.5–5.3)
RBC # BLD AUTO: 4.44 X10*6/UL (ref 4.5–5.9)
SODIUM SERPL-SCNC: 138 MMOL/L (ref 136–145)
WBC # BLD AUTO: 5.8 X10*3/UL (ref 4.4–11.3)

## 2024-11-30 PROCEDURE — 82947 ASSAY GLUCOSE BLOOD QUANT: CPT

## 2024-11-30 PROCEDURE — 85025 COMPLETE CBC W/AUTO DIFF WBC: CPT | Performed by: STUDENT IN AN ORGANIZED HEALTH CARE EDUCATION/TRAINING PROGRAM

## 2024-11-30 PROCEDURE — 94640 AIRWAY INHALATION TREATMENT: CPT

## 2024-11-30 PROCEDURE — 1100000001 HC PRIVATE ROOM DAILY

## 2024-11-30 PROCEDURE — 2500000001 HC RX 250 WO HCPCS SELF ADMINISTERED DRUGS (ALT 637 FOR MEDICARE OP): Performed by: STUDENT IN AN ORGANIZED HEALTH CARE EDUCATION/TRAINING PROGRAM

## 2024-11-30 PROCEDURE — 99231 SBSQ HOSP IP/OBS SF/LOW 25: CPT | Performed by: STUDENT IN AN ORGANIZED HEALTH CARE EDUCATION/TRAINING PROGRAM

## 2024-11-30 PROCEDURE — 36415 COLL VENOUS BLD VENIPUNCTURE: CPT | Performed by: STUDENT IN AN ORGANIZED HEALTH CARE EDUCATION/TRAINING PROGRAM

## 2024-11-30 PROCEDURE — 2500000004 HC RX 250 GENERAL PHARMACY W/ HCPCS (ALT 636 FOR OP/ED): Performed by: STUDENT IN AN ORGANIZED HEALTH CARE EDUCATION/TRAINING PROGRAM

## 2024-11-30 PROCEDURE — 80069 RENAL FUNCTION PANEL: CPT | Performed by: STUDENT IN AN ORGANIZED HEALTH CARE EDUCATION/TRAINING PROGRAM

## 2024-11-30 ASSESSMENT — PAIN - FUNCTIONAL ASSESSMENT
PAIN_FUNCTIONAL_ASSESSMENT: 0-10
PAIN_FUNCTIONAL_ASSESSMENT: 0-10

## 2024-11-30 ASSESSMENT — COGNITIVE AND FUNCTIONAL STATUS - GENERAL
TOILETING: A LITTLE
CLIMB 3 TO 5 STEPS WITH RAILING: A LITTLE
PERSONAL GROOMING: A LITTLE
MOVING FROM LYING ON BACK TO SITTING ON SIDE OF FLAT BED WITH BEDRAILS: A LITTLE
STANDING UP FROM CHAIR USING ARMS: A LITTLE
HELP NEEDED FOR BATHING: A LITTLE
DRESSING REGULAR LOWER BODY CLOTHING: A LITTLE
MOVING TO AND FROM BED TO CHAIR: A LITTLE
DRESSING REGULAR UPPER BODY CLOTHING: A LITTLE
EATING MEALS: A LITTLE
DAILY ACTIVITIY SCORE: 18
MOBILITY SCORE: 18
WALKING IN HOSPITAL ROOM: A LITTLE
TURNING FROM BACK TO SIDE WHILE IN FLAT BAD: A LITTLE

## 2024-11-30 ASSESSMENT — PAIN SCALES - GENERAL
PAINLEVEL_OUTOF10: 5 - MODERATE PAIN
PAINLEVEL_OUTOF10: 9
PAINLEVEL_OUTOF10: 10 - WORST POSSIBLE PAIN
PAINLEVEL_OUTOF10: 7

## 2024-11-30 ASSESSMENT — PAIN DESCRIPTION - DESCRIPTORS: DESCRIPTORS: ACHING

## 2024-11-30 ASSESSMENT — PAIN DESCRIPTION - LOCATION: LOCATION: HEAD

## 2024-11-30 ASSESSMENT — PAIN DESCRIPTION - ORIENTATION: ORIENTATION: RIGHT

## 2024-11-30 ASSESSMENT — PAIN SCALES - PAIN ASSESSMENT IN ADVANCED DEMENTIA (PAINAD): TOTALSCORE: MEDICATION (SEE MAR)

## 2024-11-30 NOTE — CARE PLAN
The patient's goals for the shift include  Control pian    The clinical goals for the shift include Patient will remain comfortable throughout shift.      Problem: Skin  Goal: Decreased wound size/increased tissue granulation at next dressing change  Outcome: Progressing  Goal: Participates in plan/prevention/treatment measures  Outcome: Progressing  Goal: Prevent/manage excess moisture  Outcome: Progressing  Goal: Prevent/minimize sheer/friction injuries  Outcome: Progressing  Goal: Promote/optimize nutrition  Outcome: Progressing  Goal: Promote skin healing  Outcome: Progressing     Problem: Pain  Goal: Takes deep breaths with improved pain control throughout the shift  Outcome: Progressing  Goal: Turns in bed with improved pain control throughout the shift  Outcome: Progressing  Goal: Walks with improved pain control throughout the shift  Outcome: Progressing  Goal: Performs ADL's with improved pain control throughout shift  Outcome: Progressing  Goal: Participates in PT with improved pain control throughout the shift  Outcome: Progressing  Goal: Free from opioid side effects throughout the shift  Outcome: Progressing  Goal: Free from acute confusion related to pain meds throughout the shift  Outcome: Progressing

## 2024-11-30 NOTE — PROGRESS NOTES
Tip Lynn is a 54 y.o. male on day 4 of admission presenting with Pyogenic arthritis of right knee joint (Multi).      Subjective   No events        Objective     Last Recorded Vitals  /82   Pulse 96   Temp 36.6 °C (97.9 °F)   Resp 20   Wt 91.2 kg (201 lb)   SpO2 95%   Intake/Output last 3 Shifts:    Intake/Output Summary (Last 24 hours) at 11/30/2024 1708  Last data filed at 11/30/2024 1022  Gross per 24 hour   Intake --   Output 1300 ml   Net -1300 ml       Admission Weight  Weight: 91.2 kg (201 lb) (11/26/24 0231)    Daily Weight  11/27/24 : 91.2 kg (201 lb)    Image Results  ECG 12 Lead  Normal sinus rhythm  Normal ECG  When compared with ECG of 29-OCT-2024 14:33,  No significant change was found  See ED provider note for full interpretation and clinical correlation  Confirmed by Janie Calvo ([id number]) on 11/26/2024 7:25:03 PM  XR chest 1 view  Narrative: Interpreted By:  Rico Bills,   STUDY:  XR CHEST 1 VIEW;  11/26/2024 9:51 am      INDICATION:  Signs/Symptoms:pre op.          COMPARISON:  02/29/2024      ACCESSION NUMBER(S):  [id number]      ORDERING CLINICIAN:  TIAGO LOTT      FINDINGS:  AP radiograph of the chest was provided.          CARDIOMEDIASTINAL SILHOUETTE:  Cardiomediastinal silhouette is normal in size and configuration.      LUNGS:  Lungs are clear.      ABDOMEN:  No remarkable upper abdominal findings.      BONES:  No acute osseous changes.      Impression: 1.  No evidence of acute cardiopulmonary process.              MACRO:  None      Signed by: Rico Bills 11/26/2024 10:04 AM  Dictation workstation:   [id number]  Lower extremity venous duplex right  Narrative: Interpreted By:  Gonzalo Michelle,   STUDY:  VASC US LOWER EXTREMITY VENOUS DUPLEX RIGHT; 11/26/2024 5:41 am      INDICATION:  Signs/Symptoms:Rule out DVT.      COMPARISON:  None.      ACCESSION NUMBER(S):  [id number]      ORDERING CLINICIAN:  TIAGO LOTT      TECHNIQUE:  Vascular ultrasound of the right lower  extremity was performed. Real  time compression views as well as Gray scale, color Doppler and  spectral Doppler waveform analysis was performed.      FINDINGS:  Evaluation of the visualized portions of the right common femoral  vein, proximal, mid, and distal femoral vein, and popliteal vein were  performed.  Evaluation of the visualized portions of the posterior  tibial and peroneal veins were also performed.  In addition,  evaluation of the contralateral common femoral vein was performed.      Limitations: None      The evaluated veins demonstrate normal compressibility. There is  intact venous flow demonstrating normal respiratory variability and  normal augmentation of flow with calf compression. Therefore, there  is no ultrasonographic evidence for deep vein thrombosis within the  evaluated veins. No evidence of thrombus is seen within the  contralateral common femoral vein.      There is a 6.8 x 1.3 x 3.6 cm fluid collection within the right  popliteal fossa, consistent with a Baker's cyst.      Impression: No sonographic evidence for deep vein thrombosis within the evaluated  veins of the right lower extremity.      Baker's cyst within the right popliteal fossa, as described above.      MACRO:  None      Signed by: Gonzalo Michelle 11/26/2024 5:57 AM  Dictation workstation:   [id number]      Physical Exam    Vitals reviewed.   Constitutional:       Appearance: Normal appearance.   HENT:      Head: Normocephalic.     Eyes:      Extraocular Movements: Extraocular movements intact.      Conjunctiva/sclera: Conjunctivae normal.      Pupils: Pupils are equal, round, and reactive to light.   Cardiovascular:      Rate and Rhythm: Normal rate and regular rhythm.      Pulses: Normal pulses.      Heart sounds: Normal heart sounds, S1 normal and S2 normal.   Pulmonary:      Effort: Pulmonary effort is normal.      Breath sounds: Normal breath sounds and air entry.   Abdominal:      General: Abdomen is flat. Bowel sounds  are normal.      Palpations: Abdomen is soft.   Skin:     General: Skin is warm and dry.   Neurological:      General: No focal deficit present.      Mental Status: He is alert and oriented to person, place, and time. Mental status is at baseline.   Psychiatric:         Attention and Perception: Attention normal.         Mood and Affect: Mood normal.         Speech: Speech normal.   Relevant Results               Assessment/Plan                  Assessment & Plan  Pyogenic arthritis of right knee joint (Multi)    Pyogenic arthritis of right knee joint, due to unspecified organism (Multi)            Mr. Lynn is a 54-year-old male with PMHx: CAD, MI 2008, HTN, T2DM, gout, depression, GERD, arthritis, seizures who presented to the ED with complaint of right knee pain and swelling, seen by Ortho down in the ED for joint aspiration. Right knee showed a small knee joint effusion and CXR was negative for acute cardiopulmonary process.  S/p R knee I&D 11/27. R knee hemovac drain was removed 11/29. No concern for septic arthritis. OR cultures are negative to date. Synovial fluid sent to lab for crystal examination, initial crystal examination was negative. No need for antibiotics. WBAT RLE. DVT ppx with Lovenox. Treating for CPPD vs gout flare with colchicine 0.6 mg daily until flare resolves. Please follow up on STD screen test sent 11/29. Pt is medically clear for SNF at this point.        -Held hydrochlorothiazide giving gout flare vs CPPD flare   -Colchicine 0.6 mg until flare resolves.  -removed the drain     #Right knee with CPPD vs gout flare  #Acute right knee swelling and pain, pain is controlled.   #S/p R knee I&D 11/27                   CAD-MI 2008  HTN  HLD   -Held hydrochlorothiazide 12.5 mg daily     T2DM  -Hold home Trulicity and metformin  -Moderate Humalog SSI 3 times daily and at bedtime while in the hospital  -A1c 5.2  11/26/24  Hx of   Gout  -Continue home allopurinol 400 mg daily     COPD  -Continue  home Symbicort as Breo Ellipta     GERD  -Continue home Protonix 40 mg daily     Depression  -Continue home trazodone 100 mg nightly        Fluids: monitor and replete as needed  Electrolytes: monitor and replete as needed  Nutrition: Regular diet   GI prophylaxis: Protonix 40mg QD  DVT prophylaxis: SCD  Code Status: Full code      Disposition:  SNF      30 min             Betty Mahoney MD

## 2024-12-01 ENCOUNTER — APPOINTMENT (OUTPATIENT)
Dept: RADIOLOGY | Facility: HOSPITAL | Age: 54
DRG: 489 | End: 2024-12-01
Payer: MEDICARE

## 2024-12-01 VITALS
OXYGEN SATURATION: 95 % | HEIGHT: 71 IN | BODY MASS INDEX: 28.14 KG/M2 | DIASTOLIC BLOOD PRESSURE: 87 MMHG | WEIGHT: 201 LBS | RESPIRATION RATE: 18 BRPM | HEART RATE: 101 BPM | TEMPERATURE: 97.9 F | SYSTOLIC BLOOD PRESSURE: 139 MMHG

## 2024-12-01 LAB
BACTERIA FLD CULT: NORMAL
GLUCOSE BLD MANUAL STRIP-MCNC: 124 MG/DL (ref 74–99)
GLUCOSE BLD MANUAL STRIP-MCNC: 158 MG/DL (ref 74–99)
GLUCOSE BLD MANUAL STRIP-MCNC: 169 MG/DL (ref 74–99)
GRAM STN SPEC: NORMAL
GRAM STN SPEC: NORMAL

## 2024-12-01 PROCEDURE — 2500000002 HC RX 250 W HCPCS SELF ADMINISTERED DRUGS (ALT 637 FOR MEDICARE OP, ALT 636 FOR OP/ED): Performed by: STUDENT IN AN ORGANIZED HEALTH CARE EDUCATION/TRAINING PROGRAM

## 2024-12-01 PROCEDURE — 73660 X-RAY EXAM OF TOE(S): CPT | Mod: RT

## 2024-12-01 PROCEDURE — 94640 AIRWAY INHALATION TREATMENT: CPT

## 2024-12-01 PROCEDURE — 2500000001 HC RX 250 WO HCPCS SELF ADMINISTERED DRUGS (ALT 637 FOR MEDICARE OP): Performed by: STUDENT IN AN ORGANIZED HEALTH CARE EDUCATION/TRAINING PROGRAM

## 2024-12-01 PROCEDURE — 2500000004 HC RX 250 GENERAL PHARMACY W/ HCPCS (ALT 636 FOR OP/ED): Performed by: STUDENT IN AN ORGANIZED HEALTH CARE EDUCATION/TRAINING PROGRAM

## 2024-12-01 PROCEDURE — 99232 SBSQ HOSP IP/OBS MODERATE 35: CPT | Performed by: STUDENT IN AN ORGANIZED HEALTH CARE EDUCATION/TRAINING PROGRAM

## 2024-12-01 PROCEDURE — 73660 X-RAY EXAM OF TOE(S): CPT | Mod: RIGHT SIDE | Performed by: RADIOLOGY

## 2024-12-01 PROCEDURE — 1100000001 HC PRIVATE ROOM DAILY

## 2024-12-01 PROCEDURE — 82947 ASSAY GLUCOSE BLOOD QUANT: CPT

## 2024-12-01 RX ORDER — BISMUTH SUBSALICYLATE 525 MG/30ML
524 LIQUID ORAL EVERY 6 HOURS PRN
Status: DISCONTINUED | OUTPATIENT
Start: 2024-12-01 | End: 2024-12-02 | Stop reason: HOSPADM

## 2024-12-01 RX ORDER — LISINOPRIL 5 MG/1
5 TABLET ORAL DAILY
Status: DISCONTINUED | OUTPATIENT
Start: 2024-12-01 | End: 2024-12-02 | Stop reason: HOSPADM

## 2024-12-01 ASSESSMENT — COGNITIVE AND FUNCTIONAL STATUS - GENERAL
CLIMB 3 TO 5 STEPS WITH RAILING: A LITTLE
EATING MEALS: A LITTLE
DRESSING REGULAR UPPER BODY CLOTHING: A LITTLE
DAILY ACTIVITIY SCORE: 18
TURNING FROM BACK TO SIDE WHILE IN FLAT BAD: A LITTLE
TOILETING: A LITTLE
DRESSING REGULAR LOWER BODY CLOTHING: A LITTLE
HELP NEEDED FOR BATHING: A LITTLE
STANDING UP FROM CHAIR USING ARMS: A LITTLE
PERSONAL GROOMING: A LITTLE
MOVING TO AND FROM BED TO CHAIR: A LITTLE
WALKING IN HOSPITAL ROOM: A LITTLE
MOBILITY SCORE: 19

## 2024-12-01 ASSESSMENT — PAIN - FUNCTIONAL ASSESSMENT
PAIN_FUNCTIONAL_ASSESSMENT: 0-10

## 2024-12-01 ASSESSMENT — PAIN DESCRIPTION - DESCRIPTORS
DESCRIPTORS: ACHING
DESCRIPTORS: ACHING

## 2024-12-01 ASSESSMENT — PAIN DESCRIPTION - ORIENTATION
ORIENTATION: RIGHT
ORIENTATION: RIGHT

## 2024-12-01 ASSESSMENT — PAIN SCALES - PAIN ASSESSMENT IN ADVANCED DEMENTIA (PAINAD): TOTALSCORE: MEDICATION (SEE MAR)

## 2024-12-01 ASSESSMENT — PAIN SCALES - GENERAL
PAINLEVEL_OUTOF10: 9
PAINLEVEL_OUTOF10: 10 - WORST POSSIBLE PAIN
PAINLEVEL_OUTOF10: 5 - MODERATE PAIN
PAINLEVEL_OUTOF10: 5 - MODERATE PAIN
PAINLEVEL_OUTOF10: 6

## 2024-12-01 ASSESSMENT — PAIN DESCRIPTION - LOCATION
LOCATION: KNEE
LOCATION: KNEE

## 2024-12-01 NOTE — CARE PLAN
The patient's goals for the shift include      The clinical goals for the shift include patient to have adequate pain control    Over the shift, the patient did not make progress toward the following goals. Barriers to progression include still rating pain high. Recommendations to address these barriers include continue pain q4r hours.

## 2024-12-01 NOTE — CARE PLAN
Problem: Fall/Injury  Goal: Not fall by end of shift  Outcome: Progressing  Goal: Be free from injury by end of the shift  Outcome: Progressing  Goal: Verbalize understanding of personal risk factors for fall in the hospital  Outcome: Progressing   The patient's goals for the shift include  Call for assistance to prevent fall    The clinical goals for the shift include : Maintain safety prevent fall and injury.    Over the shift, the patient did make some progress toward the maintaining safety.

## 2024-12-01 NOTE — PROGRESS NOTES
[facility] MEDICINE     PROGRESS NOTE       PATIENT NAME: Tip Lynn   MRN: [id number]   SERVICE DATE: 12/01/24   SERVICE TIME: 3:09 PM    Hospital Medicine/Primary Attending: Emily Najera MD   LENGTH OF STAY: 5     CHIEF COMPLAINT: Pyogenic arthritis of right knee joint (Multi)   Principal Problem:    Pyogenic arthritis of right knee joint (Multi)  Active Problems:    Pyogenic arthritis of right knee joint, due to unspecified organism (Multi)    Assessment/Plan    Mr. Lynn is a 54-year-old man with PMHx: CAD, MI 2008, HTN, T2DM, gout, depression, GERD, arthritis, seizures who presented to the ED with complaint of right knee pain and swelling, seen by Ortho for joint aspiration. Right knee showed a small knee joint effusion and CXR was negative for acute cardiopulmonary process.  S/p R knee I&D 11/27. R knee hemovac drain was removed 11/29. No concern for septic arthritis. OR cultures are negative to date. Synovial fluid sent to lab for crystal examination, initial crystal examination was negative. No need for antibiotics. WBAT RLE. DVT ppx with Lovenox. Treating for CPPD vs gout flare with colchicine 0.6 mg daily until flare resolves. Pt is medically clear for SNF at this point.      #Right knee with CPPD vs gout flare  #Acute right knee swelling and pain, pain is controlled.   #S/p R knee I&D 11/27  -Continue Colchicine 0.6mg daily for gout flare, Allopurinol 400mg daily for maintenance  -HOLD home hydrochlorothiazide given concern for CPPD/gout  -Pain regimen: Tylenol 975mg Q6H scheduled, Flexeril PRN, Oxycodone 10mg Q4H PRN           CAD-MI 2008  HTN  HLD   -Held hydrochlorothiazide 12.5 mg daily, continue home Amlodipine 10mg daily, Imdur 60mg daily, started Lisinopril 5mg daily  -Continue Atorvastatin 40mg daily     T2DM  -Hold home Trulicity and metformin  -Moderate Humalog SSI 3 times daily and at bedtime while in the hospital  -A1c 5.2   11/26/24    COPD  -Continue home Symbicort as Breo Ellipta     GERD  -Continue home Protonix 40 mg daily     Depression  -Continue home trazodone 100 mg nightly     Fluids: monitor and replete as needed  Electrolytes: monitor and replete as needed  Nutrition: Regular diet   GI prophylaxis: Protonix 40mg QD  DVT prophylaxis: SCD  Code Status: Full code      Disposition:  SNF    I reviewed:    All new and relevant labs and imaging   New EKGs  Consultant notes   Vitals Signs   Nursing notes           Subjective   SUBJECTIVE:  Pt seen and examined. Reports some stomach pain today, requesting Pepto-Bismol. Reports that pain in R knee is stable, about same as it has been in last few days. No other complaints today.    Patient denies CP, SOB, fevers, chills, nausea, or emesis.         Objective    PHYSICAL EXAM: Patient Vitals for the past 24 hrs:   BP Temp Pulse Resp SpO2   12/01/24 1435 116/67 36.8 °C (98.2 °F) 95 20 93 %   12/01/24 0700 -- -- -- -- 94 %   12/01/24 0537 (!) 173/112 36.5 °C (97.7 °F) 89 16 92 %   11/30/24 1948 (!) 163/97 37.1 °C (98.8 °F) 95 16 95 %   11/30/24 1519 135/82 36.6 °C (97.9 °F) 96 20 95 %      Physical Exam   Constitutional:       Appearance: Normal appearance.   HENT:      Head: Normocephalic.   Eyes:      Extraocular Movements: Extraocular movements intact.      Conjunctiva/sclera: Conjunctivae normal.      Pupils: Pupils are equal, round, and reactive to light.   Cardiovascular:      Rate and Rhythm: Normal rate and regular rhythm.      Pulses: Normal pulses.      Heart sounds: Normal heart sounds, S1 normal and S2 normal.   Pulmonary:      Effort: Pulmonary effort is normal.      Breath sounds: Normal breath sounds and air entry.   Abdominal:      General: Abdomen is flat. Bowel sounds are normal.      Palpations: Abdomen is soft.   Skin:     General: Skin is warm and dry.   MSK:       R knee wrapped with kerlix, no strike-through bleeding or drainage noted on gauze  Neurological:       "General: No focal deficit present. Strength 3/5 in b/l LE's     Mental Status: He is alert and oriented to person, place, and time. Mental status is at baseline.   Psychiatric:         Attention and Perception: Attention normal.         Mood and Affect: Mood normal.         Speech: Speech normal.     MEDICATIONS:   Scheduled medications  acetaminophen, 975 mg, oral, q6h  allopurinol, 400 mg, oral, Daily  amLODIPine, 10 mg, oral, Daily  atorvastatin, 40 mg, oral, Daily  colchicine, 0.6 mg, oral, Daily  enoxaparin, 40 mg, subcutaneous, Daily  influenza, 0.5 mL, intramuscular, During hospitalization  fluticasone furoate-vilanteroL, 1 puff, inhalation, Daily  [Held by provider] hydroCHLOROthiazide, 12.5 mg, oral, Daily  insulin lispro, 0-10 Units, subcutaneous, TID AC  isosorbide mononitrate ER, 60 mg, oral, Daily  lisinopril, 5 mg, oral, Daily  pantoprazole, 40 mg, oral, Daily  sennosides-docusate sodium, 2 tablet, oral, BID  traZODone, 100 mg, oral, Nightly      Continuous medications     PRN medications  PRN medications: bismuth subsalicylate, cyclobenzaprine, dextrose, dextrose, glucagon, glucagon, HYDROmorphone, hydrOXYzine HCL, oxyCODONE       DATA:      Diagnostic tests reviewed for today's visit:     CBC:   Results from last 72 hours   Lab Units 11/30/24  0532   WBC AUTO x10*3/uL 5.8   HEMATOCRIT % 39.6*   PLATELETS AUTO x10*3/uL 239   MCV fL 89     Coags:     CMP:   Results from last 72 hours   Lab Units 11/30/24  0532   SODIUM mmol/L 138   POTASSIUM mmol/L 3.9   CO2 mmol/L 30   BUN mg/dL 19   ALBUMIN g/dL 3.5      Cardiac Enzymes: No lab exists for component: \"TROP\" .lab  Liver Function, Amylase, Lipase:       No lab exists for component: \"TPROT\", \"ALB\", \"TBILI\"   MG/PHOS: [id number](mg,p)@   Renal Panel:    Results from last 72 hours   Lab Units 11/30/24  0532   ALBUMIN g/dL 3.5   BUN mg/dL 19   POTASSIUM mmol/L 3.9   CO2 mmol/L 30   SODIUM mmol/L 138      Heme:        No lab exists for component: " "\"RETICP\", \"ABSRETIC\", \"LD\", \"JOURDAN\", \"FE\", \"TRANSFERSAT\"   No components found for: \"UALBCR\"      Medication and Non-Pharmacologic VTE Prophylaxis/Anticoagulants    The patient has received anticoagulants recently:  Heparin is ordered or has been given within the last 24 hours OR  Lovenox has been given in the last 24 hours OR  An anticoagulant other than Heparin or Lovenox is on the home med list OR  An anticoagulant other than Heparin or Lovenox has been given within the last 5 days  Last Anticoag Admin            enoxaparin (Lovenox) syringe 40 mg    Given 40 mg at 0900    Frequency: Daily         There are additional administrations since 11/28/24 1509 that are not shown.    No unadministered anticoagulant orders found.                 SIGNATURE: Emily Najera MD   Hospital Medicine    PAGER/CONTACT #: Epic Chat      Portions of this note including HPI, ROS, impression/plan, and examination may have been copied forward from yesterday to December 1, 2024 as to provide important historical information essential in contributing to medical decision making. Documentation has been reviewed and edited as necessary to support clinical decision making for today's visit and to reflect my own independent evaluation of this patient.       The time of this note does not reflect the time I saw the patient but the time that this note was written    "

## 2024-12-02 VITALS
HEART RATE: 80 BPM | OXYGEN SATURATION: 94 % | WEIGHT: 201 LBS | HEIGHT: 71 IN | BODY MASS INDEX: 28.14 KG/M2 | RESPIRATION RATE: 19 BRPM | SYSTOLIC BLOOD PRESSURE: 131 MMHG | TEMPERATURE: 97.5 F | DIASTOLIC BLOOD PRESSURE: 89 MMHG

## 2024-12-02 LAB
ATRIAL RATE: 83 BPM
BACTERIA FLD CULT: NORMAL
GLUCOSE BLD MANUAL STRIP-MCNC: 124 MG/DL (ref 74–99)
GLUCOSE BLD MANUAL STRIP-MCNC: 180 MG/DL (ref 74–99)
GRAM STN SPEC: NORMAL
GRAM STN SPEC: NORMAL
P AXIS: 39 DEGREES
P OFFSET: 201 MS
P ONSET: 160 MS
PR INTERVAL: 138 MS
Q ONSET: 229 MS
QRS COUNT: 14 BEATS
QRS DURATION: 74 MS
QT INTERVAL: 376 MS
QTC CALCULATION(BAZETT): 441 MS
QTC FREDERICIA: 419 MS
R AXIS: 15 DEGREES
T AXIS: 27 DEGREES
T OFFSET: 417 MS
VENTRICULAR RATE: 83 BPM

## 2024-12-02 PROCEDURE — 82947 ASSAY GLUCOSE BLOOD QUANT: CPT

## 2024-12-02 PROCEDURE — 2500000001 HC RX 250 WO HCPCS SELF ADMINISTERED DRUGS (ALT 637 FOR MEDICARE OP): Performed by: STUDENT IN AN ORGANIZED HEALTH CARE EDUCATION/TRAINING PROGRAM

## 2024-12-02 PROCEDURE — 99239 HOSP IP/OBS DSCHRG MGMT >30: CPT | Performed by: STUDENT IN AN ORGANIZED HEALTH CARE EDUCATION/TRAINING PROGRAM

## 2024-12-02 PROCEDURE — 97116 GAIT TRAINING THERAPY: CPT | Mod: GP,CQ

## 2024-12-02 PROCEDURE — 97110 THERAPEUTIC EXERCISES: CPT | Mod: GP,CQ

## 2024-12-02 PROCEDURE — 94660 CPAP INITIATION&MGMT: CPT

## 2024-12-02 PROCEDURE — 2500000004 HC RX 250 GENERAL PHARMACY W/ HCPCS (ALT 636 FOR OP/ED): Performed by: STUDENT IN AN ORGANIZED HEALTH CARE EDUCATION/TRAINING PROGRAM

## 2024-12-02 PROCEDURE — 2500000002 HC RX 250 W HCPCS SELF ADMINISTERED DRUGS (ALT 637 FOR MEDICARE OP, ALT 636 FOR OP/ED): Performed by: STUDENT IN AN ORGANIZED HEALTH CARE EDUCATION/TRAINING PROGRAM

## 2024-12-02 RX ORDER — BISMUTH SUBSALICYLATE 525 MG/30ML
15 LIQUID ORAL EVERY 6 HOURS PRN
Start: 2024-12-02

## 2024-12-02 RX ORDER — ALLOPURINOL 100 MG/1
TABLET ORAL
Qty: 30 TABLET | Refills: 10 | OUTPATIENT
Start: 2024-12-02

## 2024-12-02 RX ORDER — LISINOPRIL 5 MG/1
5 TABLET ORAL DAILY
Start: 2024-12-02

## 2024-12-02 RX ORDER — OXYCODONE HYDROCHLORIDE 10 MG/1
5 TABLET ORAL EVERY 6 HOURS PRN
Qty: 5 TABLET | Refills: 0 | Status: SHIPPED | OUTPATIENT
Start: 2024-12-02

## 2024-12-02 RX ORDER — ISOSORBIDE MONONITRATE 30 MG/1
60 TABLET, EXTENDED RELEASE ORAL DAILY
Start: 2024-12-02

## 2024-12-02 RX ORDER — OXYCODONE HYDROCHLORIDE 5 MG/1
5 TABLET ORAL EVERY 6 HOURS PRN
Status: DISCONTINUED | OUTPATIENT
Start: 2024-12-02 | End: 2024-12-02 | Stop reason: HOSPADM

## 2024-12-02 ASSESSMENT — COGNITIVE AND FUNCTIONAL STATUS - GENERAL
CLIMB 3 TO 5 STEPS WITH RAILING: TOTAL
HELP NEEDED FOR BATHING: A LITTLE
MOVING TO AND FROM BED TO CHAIR: A LITTLE
STANDING UP FROM CHAIR USING ARMS: A LITTLE
MOBILITY SCORE: 15
WALKING IN HOSPITAL ROOM: A LITTLE
DAILY ACTIVITIY SCORE: 18
EATING MEALS: A LITTLE
MOVING TO AND FROM BED TO CHAIR: A LITTLE
MOVING FROM LYING ON BACK TO SITTING ON SIDE OF FLAT BED WITH BEDRAILS: A LITTLE
TOILETING: A LITTLE
DRESSING REGULAR LOWER BODY CLOTHING: A LITTLE
TURNING FROM BACK TO SIDE WHILE IN FLAT BAD: A LITTLE
MOBILITY SCORE: 19
PERSONAL GROOMING: A LITTLE
CLIMB 3 TO 5 STEPS WITH RAILING: A LITTLE
TURNING FROM BACK TO SIDE WHILE IN FLAT BAD: A LOT
STANDING UP FROM CHAIR USING ARMS: A LITTLE
WALKING IN HOSPITAL ROOM: A LITTLE
DRESSING REGULAR UPPER BODY CLOTHING: A LITTLE

## 2024-12-02 ASSESSMENT — PAIN SCALES - GENERAL
PAINLEVEL_OUTOF10: 4
PAINLEVEL_OUTOF10: 10 - WORST POSSIBLE PAIN
PAINLEVEL_OUTOF10: 5 - MODERATE PAIN
PAINLEVEL_OUTOF10: 7
PAINLEVEL_OUTOF10: 5 - MODERATE PAIN
PAINLEVEL_OUTOF10: 7

## 2024-12-02 ASSESSMENT — PAIN - FUNCTIONAL ASSESSMENT
PAIN_FUNCTIONAL_ASSESSMENT: 0-10

## 2024-12-02 ASSESSMENT — PAIN DESCRIPTION - DESCRIPTORS: DESCRIPTORS: ACHING

## 2024-12-02 ASSESSMENT — PAIN DESCRIPTION - LOCATION: LOCATION: KNEE

## 2024-12-02 ASSESSMENT — PAIN DESCRIPTION - ORIENTATION: ORIENTATION: RIGHT

## 2024-12-02 ASSESSMENT — PAIN SCALES - PAIN ASSESSMENT IN ADVANCED DEMENTIA (PAINAD): TOTALSCORE: MEDICATION (SEE MAR)

## 2024-12-02 NOTE — DISCHARGE SUMMARY
Discharge Diagnosis  CPPD flare     Issues Requiring Follow-Up  PCP     Discharge Meds     Medication List      START taking these medications     bismuth subsalicylate 262 mg/15 mL suspension; Commonly known as: Pepto   Bismol; Take 15 mL (262 mg) by mouth every 6 hours if needed for   indigestion or heartburn.   colchicine 0.6 mg tablet; Take 1 tablet (0.6 mg) by mouth once daily.   Continue colchicine 0.6 mg until the flare resolves.   hydrOXYzine HCL 25 mg tablet; Commonly known as: Atarax; Take 1 tablet   (25 mg) by mouth every 6 hours if needed for anxiety.   isosorbide mononitrate ER 30 mg 24 hr tablet; Commonly known as: Imdur;   Take 2 tablets (60 mg) by mouth once daily. Do not crush or chew.   oxyCODONE 10 mg immediate release tablet; Commonly known as: Roxicodone;   Take 0.5 tablets (5 mg) by mouth every 6 hours if needed for severe pain   (7 - 10) or moderate pain (4 - 6).   sennosides-docusate sodium 8.6-50 mg tablet; Commonly known as:   Charlene-Colace; Take 2 tablets by mouth 2 times a day.     CHANGE how you take these medications     acetaminophen 325 mg tablet; Commonly known as: Tylenol; Take 3 tablets   (975 mg) by mouth every 8 hours if needed for mild pain (1 - 3), headaches   or fever (temp greater than 38.0 C).; What changed: medication strength,   how much to take, when to take this, reasons to take this   allopurinoL 200 mg tablet; Take 400 mg by mouth once daily.; What   changed: medication strength, See the new instructions.   amLODIPine 10 mg tablet; Commonly known as: Norvasc; Take 1 tablet (10   mg) by mouth once daily.; What changed: medication strength, how much to   take, additional instructions     CONTINUE taking these medications     albuterol 90 mcg/actuation inhaler; Inhale 1 puff every 6 hours if   needed for wheezing or shortness of breath.   atorvastatin 40 mg tablet; Commonly known as: Lipitor   budesonide-formoteroL 80-4.5 mcg/actuation inhaler; Commonly known as:    Symbicort; Inhale 2 puffs 2 times a day. Rinse mouth with water after use   to reduce aftertaste and incidence of candidiasis. Do not swallow.   cyclobenzaprine 5 mg tablet; Commonly known as: Flexeril   Descovy 200-25 mg tablet; Generic drug: emtricitabine-tenofovir alafen   dulaglutide 1.5 mg/0.5 mL pen injector injection; Commonly known as:   Trulicity; Inject 1.5 mg under the skin 1 (one) time per week.   metFORMIN 500 mg tablet; Commonly known as: Glucophage   pantoprazole 40 mg EC tablet; Commonly known as: ProtoNix; Take 1 tablet   (40 mg) by mouth once daily.   traZODone 100 mg tablet; Commonly known as: Desyrel     STOP taking these medications     hydroCHLOROthiazide 12.5 mg tablet; Commonly known as: Microzide   meloxicam 15 mg tablet; Commonly known as: Mobic       Test Results Pending At Discharge  Pending Labs       No current pending labs.            Hospital Course           Mr. Lynn is a 54-year-old male with PMHx: CAD, MI 2008, HTN, T2DM, gout, depression, GERD, arthritis, seizures who presented to the ED with complaint of right knee pain and swelling, seen by Ortho down in the ED for joint aspiration. Right knee showed a small knee joint effusion and CXR was negative for acute cardiopulmonary process.  S/p R knee I&D 11/27. R knee hemovac drain was removed 11/29. No concern for septic arthritis. OR cultures are negative to date. Synovial fluid sent to lab for crystal examination, initial crystal examination was negative. No need for antibiotics. WBAT RLE. DVT ppx with Lovenox. Treating for CPPD vs gout flare with colchicine 0.6 mg daily until flare resolves. Please follow up on STD screen test sent 11/29. Pt is medically clear for SNF at this point.   -Held hydrochlorothiazide giving gout flare vs CPPD flare   -Colchicine 0.6 mg until flare resolves.  -removed the drain    60 min       Pertinent Physical Exam At Time of Discharge  Physical Exam    Outpatient Follow-Up  Future Appointments    Date Time Provider Department [place]   12/9/2024 10:00 AM Hiral Biswas MD [id number] [facility]   12/16/2024 10:00 AM Zuly Nascimento DPM [id number] [place]   12/17/2024  2:45 PM Zee Light, DO [id number] [facility]   12/20/2024  2:30 PM Christ Weston, DO [id number] [facility]   12/23/2024  2:40 PM Audra Palencia PA-C [id number] [facility]   1/24/2025  8:30 AM Mariaelena Medeiros MD [id number] [facility]   4/21/2025 10:00 AM Jannette Birch DPM [id number] [facility]   4/22/2025  1:20 PM Jostin Rocha MD [id number] [facility]   4/23/2025  1:00 PM Jens Monteiro MD [id number] [facility]   4/29/2025  2:40 PM Jostin Rocha MD [id number] [facility]         Betty Mahoney MD

## 2024-12-02 NOTE — CARE PLAN
Problem: Fall/Injury  Goal: Not fall by end of shift  Outcome: Progressing  Goal: Be free from injury by end of the shift  Outcome: Progressing   he patient's goals for the shift include  Call for assistance to prevent fall     The clinical goals for the shift include : Maintain safety prevent fall and injury.     Over the shift, the patient did make some progress toward the maintaining safety.

## 2024-12-02 NOTE — PROGRESS NOTES
Physical Therapy    Physical Therapy Treatment    Patient Name: Tip Lynn  MRN: [id number]  Department: [facility] [place] [id number]  Room: [address] [place] [address]  Today's Date: 12/2/2024  Time Calculation  Start Time: 0857  Stop Time: 0925  Time Calculation (min): 28 min         Assessment/Plan   PT Assessment  End of Session Communication: Bedside nurse  Assessment Comment: Pt tolerated PT session well, able to progress ambulation to room doorway with FWW and Amanuel. Pt continues to require Mod-Amanuel for bed mobility, transfers and ambulation. Pt continues to remain appropriate for High intensity PT upon D/C from hospital.  End of Session Patient Position: Bed, 4 rail up, Alarm off, not on at start of session (4 rails up per pt's request.)  PT Plan  Inpatient/Swing Bed or Outpatient: Inpatient  PT Plan  Treatment/Interventions: Bed mobility, Gait training, Balance training, Transfer training, Strengthening, Range of motion, Endurance training, Therapeutic activity, Therapeutic exercise  PT Plan: Ongoing PT  PT Frequency: 5 times per week  PT Discharge Recommendations: High intensity level of continued care  Equipment Recommended upon Discharge: Wheeled walker  PT Recommended Transfer Status: Assist x1, Assistive device  PT - OK to Discharge: Yes (meaning pt seen and dc rec made)      General Visit Information:   PT  Visit  PT Received On: 12/02/24  General  Missed Visit Reason:  (n/a)  Family/Caregiver Present: No  Prior to Session Communication: Bedside nurse  Patient Position Received: Bed, 4 rail up, Alarm off, not on at start of session  General Comment: Pt supine in bed on arrival, agreeable to work with PT.    Subjective   Precautions:  Precautions  LE Weight Bearing Status: Weight Bearing as Tolerated (RLE)  Medical Precautions: Fall precautions    Objective   Pain:  Pain Assessment  Pain Assessment: 0-10  0-10 (Numeric) Pain Score: 10 - Worst possible pain  Pain Type: Surgical pain  Pain Location: Knee  Pain Orientation:  Right  Cognition:  Cognition  Overall Cognitive Status: Within Functional Limits  Orientation Level: Oriented X4    Activity Tolerance:  Activity Tolerance  Endurance: Tolerates 10 - 20 min exercise with multiple rests  Treatments:  Therapeutic Exercise  Therapeutic Exercise Performed: Yes  Therapeutic Exercise Activity 1: Supine: AP, QS, HS, SAQ, Hip ABD x10 BLE (AAROM RLE HS, SAQ, and Hip ABD)    Bed Mobility  Bed Mobility: Yes  Bed Mobility 1  Bed Mobility 1: Supine to sitting  Level of Assistance 1: Minimum assistance, Contact guard, Minimal verbal cues  Bed Mobility Comments 1: Assist with RLE, however pt able to progress to use of LLE under RLE to swing OOB.  Bed Mobility 2  Bed Mobility  2: Sitting to supine  Level of Assistance 2: Moderate assistance, Minimal verbal cues  Bed Mobility Comments 2: Assist with RLE    Ambulation/Gait Training  Ambulation/Gait Training Performed: Yes  Ambulation/Gait Training 1  Surface 1: Level tile  Device 1: Rolling walker  Assistance 1: Minimum assistance, Minimal verbal cues  Quality of Gait 1: Diminished heel strike, Inconsistent stride length, Decreased step length, Shuffling gait, Antalgic (Decreased chris, decreased endurance, slightly unsteady, (x1 bout of unsteadiness))  Comments/Distance (ft) 1: 8ft x2 trials, increased verbal cues and demo provided prior to mobility, Pt reports some dizziness initially however did not increased during ambulation.    Transfers  Transfer: Yes  Transfer 1  Transfer From 1: Bed to  Transfer to 1: Stand  Technique 1: Sit to stand  Transfer Device 1: Walker  Transfer Level of Assistance 1: Minimum assistance, Minimal verbal cues  Trials/Comments 1: x1 trial, from elevated bed height, cues for sequencing and hand placement.  Transfers 2  Transfer From 2: Stand to  Transfer to 2: Bed  Technique 2: Stand to sit  Transfer Device 2: Walker  Transfer Level of Assistance 2: Minimum assistance, Minimal verbal cues  Trials/Comments 2: x1, cues  to kick out RLE prior to sitting.    Stairs  Stairs: No    Outcome Measures:  [facility] Basic Mobility  Turning from your back to your side while in a flat bed without using bedrails: A little  Moving from lying on your back to sitting on the side of a flat bed without using bedrails: A lot  Moving to and from bed to chair (including a wheelchair): A little  Standing up from a chair using your arms (e.g. wheelchair or bedside chair): A little  To walk in hospital room: A little  Climbing 3-5 steps with railing: Total  Basic Mobility - Total Score: 15    Education Documentation  Precautions, taught by Isaura Garcia PTA at 12/2/2024 10:12 AM.  Learner: Patient  Readiness: Acceptance  Method: Explanation  Response: Verbalizes Understanding, Needs Reinforcement  Comment: HEP, sequencing with WBAT and FWW,    Body Mechanics, taught by Isaura Garcia PTA at 12/2/2024 10:12 AM.  Learner: Patient  Readiness: Acceptance  Method: Explanation  Response: Verbalizes Understanding, Needs Reinforcement  Comment: HEP, sequencing with WBAT and FWW,    Mobility Training, taught by Isaura Garcia PTA at 12/2/2024 10:12 AM.  Learner: Patient  Readiness: Acceptance  Method: Explanation  Response: Verbalizes Understanding, Needs Reinforcement  Comment: HEP, sequencing with WBAT and FWW,    Education Comments  No comments found.        OP EDUCATION:       Encounter Problems       Encounter Problems (Active)       PT Problem       Patient will ambulate >80' with LRAD and Modified Independent  (Progressing)       Start:  11/28/24    Expected End:  12/12/24            Patient will perform sit<>stand transfer with LRAD, and Modified Independent  (Progressing)       Start:  11/28/24    Expected End:  12/12/24            Patient will complete supine to sit and sit to supine Independent  (Progressing)       Start:  11/28/24    Expected End:  12/12/24            Pt will demo static/dynamic standing with LRAD and IND x 2 min with 0 LOB  (Progressing)       Start:  11/28/24    Expected End:  12/12/24 12/02/24 at 10:14 AM   Isaura Garcia PTA   Rehab Office: [phone]

## 2024-12-02 NOTE — PROGRESS NOTES
12/02/24 1000   Discharge Planning   Home or Post Acute Services Post acute facilities (Rehab/SNF/etc)   Type of Post Acute Facility Services Skilled nursing   Expected Discharge Disposition SNF   Does the patient need discharge transport arranged? Yes   RoundTrip coordination needed? Yes   Has discharge transport been arranged? Yes   What day is the transport expected? 12/02/24   What time is the transport expected? 1400     Patient to discharge to [facility] today. Transport arranged via [facility] stretcher for 2pm. Report #: [phone]. Patient, sister MD Mariely, Naima RN aware. Blue slip delivered to unit secretary.   Layne BOWLING, RN  Transitional Care Coordinator (TCC)  [phone]

## 2024-12-09 ENCOUNTER — APPOINTMENT (OUTPATIENT)
Dept: RHEUMATOLOGY | Facility: CLINIC | Age: 54
End: 2024-12-09
Payer: MEDICARE

## 2024-12-09 VITALS
BODY MASS INDEX: 30.8 KG/M2 | WEIGHT: 220 LBS | SYSTOLIC BLOOD PRESSURE: 148 MMHG | DIASTOLIC BLOOD PRESSURE: 94 MMHG | HEIGHT: 71 IN | HEART RATE: 110 BPM

## 2024-12-09 DIAGNOSIS — M11.20 PSEUDOGOUT: Primary | ICD-10-CM

## 2024-12-09 DIAGNOSIS — M00.9 PYOGENIC ARTHRITIS OF RIGHT KNEE JOINT, DUE TO UNSPECIFIED ORGANISM (MULTI): ICD-10-CM

## 2024-12-09 LAB
ATRIAL RATE: 83 BPM
P AXIS: 39 DEGREES
P OFFSET: 201 MS
P ONSET: 160 MS
PR INTERVAL: 138 MS
Q ONSET: 229 MS
QRS COUNT: 14 BEATS
QRS DURATION: 74 MS
QT INTERVAL: 376 MS
QTC CALCULATION(BAZETT): 441 MS
QTC FREDERICIA: 419 MS
R AXIS: 15 DEGREES
T AXIS: 27 DEGREES
T OFFSET: 417 MS
VENTRICULAR RATE: 83 BPM

## 2024-12-09 PROCEDURE — 1036F TOBACCO NON-USER: CPT | Performed by: INTERNAL MEDICINE

## 2024-12-09 PROCEDURE — 3044F HG A1C LEVEL LT 7.0%: CPT | Performed by: INTERNAL MEDICINE

## 2024-12-09 PROCEDURE — 99214 OFFICE O/P EST MOD 30 MIN: CPT | Performed by: INTERNAL MEDICINE

## 2024-12-09 PROCEDURE — 4010F ACE/ARB THERAPY RXD/TAKEN: CPT | Performed by: INTERNAL MEDICINE

## 2024-12-09 PROCEDURE — 3077F SYST BP >= 140 MM HG: CPT | Performed by: INTERNAL MEDICINE

## 2024-12-09 PROCEDURE — 3008F BODY MASS INDEX DOCD: CPT | Performed by: INTERNAL MEDICINE

## 2024-12-09 PROCEDURE — 3080F DIAST BP >= 90 MM HG: CPT | Performed by: INTERNAL MEDICINE

## 2024-12-09 RX ORDER — COLCHICINE 0.6 MG/1
0.6 TABLET ORAL DAILY
Qty: 30 TABLET | Refills: 3 | Status: SHIPPED | OUTPATIENT
Start: 2024-12-09

## 2024-12-09 ASSESSMENT — PAIN SCALES - GENERAL: PAINLEVEL_OUTOF10: 0-NO PAIN

## 2024-12-09 NOTE — PROGRESS NOTES
"Recall    52 year old with metabolic syndrome, Gout, left Rotator cuff impingement and Left Hip TAYLOR      Current meds  Allopurinol 400 mg daily from  on 9/14/2023  Last UA 5.1 mg/dl  Does not have inflammatory back pain. No diagnosis of IBD, Uveitis, Psoriasis. No family history of autoimmune issues     Previously has HTN and CARLOS with Pre DM  Prescribed CPAP but thinks it is not working  has daytime somnolence and fatigue   Left shoulder injection in 9/2024.         Chief Complaint: Follow up of gout and after the hospital discharge      HPI: Patient reports being admitted on 11/25/2024 with right knee pain and swelling. Went to ED and had I/D of the right knee for suspected septic arthritis. The fluid revealed 44,000 WBC and CPPD crystals. The culture was negative.   Was treated with colchicine 0.6 mg/day.   No other joint pain and swelling.   Has no pain today.        Review of Systems  Constitutional: Denies fever, chills  Eyes: Denies dry eyes, redness of the eyes, pain in the eyes   ENT: Denies dry mouth, sores in the mouth, poor dentition   Cardiovascular: Denies chest pain, lower extremity edema  Respiratory: Denies shortness of breath, cough, hemoptysis  Gastrointestinal: Denies abdominal pain, N/V/D, dysphagia, odynophagia, heartburn   Integumentary: Denies rash, photosensitivity, nail changes, alopecia  Hematologic/Lymphatic: Denies bleeding, easy bruising, Raynaud's phenomena   MSK: As per HPI             Physical Exam  Blood pressure (!) 148/94, pulse 110, height 1.803 m (5' 11\"), weight 99.8 kg (220 lb).  General: Cooperative, in NAD   Eyes: Conjunctiva clear, sclera white, anicteric   Nose: No external deformity, no mucosal crusting or signs of bleeding   Throat/Mouth: Moist mucous membranes, normal dentition, no ulcers or lesions   Lungs: No respiratory distress; lungs CTAB; no wheezes, rales, rhonchi, or stridor   Heart: Normal S1 and S2; regular rate and rhythm; no murmurs, rubs, or gallops  Skin: " No rashes, ulcers, tophi, or nodules noted  MSK:   Right knee: The suture has no discharge  but the knee is swollen.     Shoulders: there is subacromial tenderness, some tenderness over the ant aspect over the bicipital groove, Left shoulder limited active abudction and flexion to 90 degress  With passive abduction and flexion also restricted to 80 degrees and 70 degrees respectively ( previously around 120 degrees)  External rotation limited > Internal rotation    Left shoulder Ultrasound  Ultrasound of left shoulder performed again did not show any tear of biceps tendon or evidence of bicipital tendinitis, supraspinatus tendon looks normal without suspicion of tear, infraspinatus or sub scapularis poor visualized because of limited dynamic exam    No fluid around the subacromial bursae seen but there is thickening of subacomial bursa anterior views  Could not visualize the GH joint and Labrum well on the ultrasound with the linear probe    Last BMP:   Lab Results   Component Value Date     11/30/2024    K 3.9 11/30/2024    CL 99 11/30/2024    CO2 30 11/30/2024    BUN 19 11/30/2024    CREATININE 1.08 11/30/2024    CALCIUM 9.0 11/30/2024     Lab Results   Component Value Date    URICACID 5.1 10/16/2023       MRI of the left shoulder with out IV contrast      INDICATION:  Signs/Symptoms:chronic left shoulder pain - rule out RC arthropathy/  Labral tear      COMPARISON:  Shoulder radiographs 02/29/2024.      ACCESSION NUMBER(S):  [id number]      ORDERING CLINICIAN:  VINAYAK GARCIA      TECHNIQUE:  Multiplanar multisequence MRI of the left shoulder was performed  without intravenous contrast.      FINDINGS:  Study is limited by patient motion the coronal series images.  Acromioclavicular Joint: Mild acromioclavicular joint osteoarthrosis  with osteophytosis. No evidence for abnormal increased fluid in the  subacromial bursa. Mild acromioclavicular joint osteoarthrosis with  osteophytosis.      Biceps Tendon:  The intra and extra articular portion of the biceps  tendon are within normal limits. There is mild extra-articular long  head biceps tenosynovitis with small amount of increased tendon  sheath fluid.      Rotator Cuff:  There is mild supraspinatus and anterior infraspinatus tendinosis  with mild thickening and increased intermediate fluid signal. No  supraspinatus and infraspinatus tendon tear. Teres minor and  subscapularis tendons are within normal limits.      Muscles:  Normal signal intensity and volume. No evidence of muscular  edema or atrophy.      Labrum:  Evaluation of labrum is limited due to lack of  intraarticular contrast. There is degenerative tearing of the  superior and posterior glenoid labrum with blunting and irregularity.  There is a chondrolabral junction tearing of the posterosuperior  glenoid labrum as seen on axial image 16.      Articular Cartilage:  The articular cartilage of the glenoid and  humeral head are intact.      Bones:  The marrow signal of the humeral head is within normal  limits. No evidence acute fracture or contusion.      Nerves:  No evidence of mass effect in the region of the  quadrilateral space or suprascapular nerve.      Other: None.      IMPRESSION:  1. Mild supraspinatus and anterior infraspinatus tendinosis without  tear.  2. Chronic degenerative type tearing of the superior and posterior  glenoid labrum with a superimposed chondrolabral junction tear of the  posterosuperior quadrant.    Component      Latest Ref Rng 8/2/2023 11/26/2024   WBC      4.4 - 11.3 x10*3/uL  7.2    nRBC      0.0 - 0.0 /100 WBCs  0.0    RBC      4.50 - 5.90 x10*6/uL  4.95    HEMOGLOBIN      13.5 - 17.5 g/dL  15.0    HEMATOCRIT      41.0 - 52.0 %  41.6    MCV      80 - 100 fL  84    MCH      26.0 - 34.0 pg  30.3    MCHC      32.0 - 36.0 g/dL  36.1 (H)    RED CELL DISTRIBUTION WIDTH      11.5 - 14.5 %  13.6    Platelets      150 - 450 x10*3/uL  219    Neutrophils %      40.0 - 80.0 %  70.1     Immature Granulocytes %, Automated      0.0 - 0.9 %  0.3    Lymphocytes %      13.0 - 44.0 %  19.4    Monocytes %      2.0 - 10.0 %  9.1    Eosinophils %      0.0 - 6.0 %  0.8    Basophils %      0.0 - 2.0 %  0.3    Neutrophils Absolute      1.20 - 7.70 x10*3/uL  5.03    Immature Granulocytes Absolute, Automated      0.00 - 0.70 x10*3/uL  0.02    Lymphocytes Absolute      1.20 - 4.80 x10*3/uL  1.39    Monocytes Absolute      0.10 - 1.00 x10*3/uL  0.65    Eosinophils Absolute      0.00 - 0.70 x10*3/uL  0.06    Basophils Absolute      0.00 - 0.10 x10*3/uL  0.02    GLUCOSE      74 - 99 mg/dL  115 (H)    SODIUM      136 - 145 mmol/L  136    POTASSIUM      3.5 - 5.3 mmol/L  3.8    CHLORIDE      98 - 107 mmol/L  103    Bicarbonate      21 - 32 mmol/L  23    Anion Gap      10 - 20 mmol/L  14    Blood Urea Nitrogen      6 - 23 mg/dL  13    Creatinine      0.50 - 1.30 mg/dL  1.02    EGFR      >60 mL/min/1.73m*2  88    Calcium      8.6 - 10.6 mg/dL  9.1    Albumin      3.4 - 5.0 g/dL  4.4    Alkaline Phosphatase      33 - 120 U/L  58    Total Protein      6.4 - 8.2 g/dL  7.6    AST      9 - 39 U/L  20    Bilirubin Total      0.0 - 1.2 mg/dL  0.7    ALT      10 - 52 U/L  23    Neutrophils %, Manual, Fluid      <25 % %  89    Lymphocytes %, Manual, Fluid      <75 % %  1    Mono/Macrophages %, Manual, Fluid      <70 % %  10    Eosinophils %, Manual, Fluid      0 % %  0    Basophils %, Manual, Fluid      0 % %  0    Immature Granulocytes %, Manual, Fluid      0 % %  0    Blasts %, Manual, Fluid      0 % %  0    Unclassified Cells %, Manual, Fluid      0 % %  0    Plasma Cells %, Manual, Fluid      0 % %  0    Total Cells Counted, Fluid  100    Color, Fluid      Colorless, Straw, Yellow   Yellow    Clarity, Fluid      Clear   Turbid !    WBC, Fluid      See Comment /uL  44,290    RBC, Fluid      0  /uL /uL  0    URIC ACID      4.0 - 7.5 mg/dL 7.2     Sed Rate      0 - 20 mm/h  23 (H)    C-Reactive Protein      <1.00 mg/dL  4.53  (H)    Crystal Identification, Synovial Fluid      No crystals seen by bright-field or first order red axis polarization microscopy.   Calcium Pyrophosphate Crystals ! (C)   Protein, Total Fluid      Not established g/dL  2.9            Assessment/plan: 54 yr old BM with recent right knee arthritis secondary to CPPD. Will continue colchicine 0.6 mg/day.   Gout in remission. Continue allopurinol 400 mg/day.     Message sent to Dr. Pittman regarding suture removal.       Hiral Biswas MD

## 2024-12-11 ENCOUNTER — PATIENT OUTREACH (OUTPATIENT)
Dept: CARE COORDINATION | Facility: CLINIC | Age: 54
End: 2024-12-11
Payer: MEDICARE

## 2024-12-11 ENCOUNTER — DOCUMENTATION (OUTPATIENT)
Dept: CARE COORDINATION | Facility: CLINIC | Age: 54
End: 2024-12-11
Payer: MEDICARE

## 2024-12-11 NOTE — PROGRESS NOTES
Called patient's phone number is disconnected, contacted patient's emergency contact that number is disconnected as well. Unable to reach patient via phone.    Emailed a few locations for food assistance/ utility assistance to patient's email.     Appointment Line for Hire Space [phone]    [facility] [phone]    [facility] (M-TH 9A-11:30A)Utilities  [address] [place] [address]  [phone]     [facility] (Sat. 9:30A-10:30A)  [address] [place] [address]  [phone]    [facility]  (Sat. 10a-12P)  [address] [place] [address]  [phone]

## 2024-12-14 NOTE — DOCUMENTATION CLARIFICATION NOTE
"    PATIENT:               IAN ALVAREZ  ACCT #:                  [id number]  MRN:                       [id number]  :                       1970  ADMIT DATE:       2024 2:17 AM  DISCH DATE:        2024 2:26 PM  RESPONDING PROVIDER #:        [id number]          PROVIDER RESPONSE TEXT:    Non-excisional debridement down to and including bone Femur, patella, and tibia    CDI QUERY TEXT:    Clarification    Instruction:    Based on your assessment of the patient and the clinical information, please provide the requested documentation by clicking on the appropriate radio button and enter any additional information if prompted.    Question: Please further clarify the debridement procedure as    When answering this query, please exercise your independent professional judgment. The fact that a question is being asked, does not imply that any particular answer is desired or expected.    The patient's clinical indicators include:  Clinical Information: This query refers to the procedure performed on 2024 and documented as \"Right knee open irrigation and debridement\".     op note indicates initial aspiration with concern for possible septic arthritis so decision made to proceed with surgery in form of irrigation and debridement.     op note \"made a longitudinal incision over the anterior knee. Flaps were elevated medially and laterally. Using a new #10 blade, we made a medial parapatellar approach\" \"encountered a copious amount of cloudy yellow synovial fluid\", \"joint was copiously irrigated with normal saline followed by Irrisept solution\"  Options provided:  -- Non-excisional debridement down to and including subcutaneous tissue and fascia  -- Non-excisional debridement down to and including muscle  -- Non-excisional debridement down to and including bone, Please specify bone involved below  -- Excisional debridement down to and including subcutaneous tissue and fascia  -- Excisional debridement " down to and including muscle  -- Excisional debridement down to and including bone, Please specify bone involved below  -- Other - I will add my own diagnosis  -- Refer to Clinical Documentation Reviewer    Query created by: Orin Dias on 12/11/2024 10:22 AM      Electronically signed by:  CUBA HUNT MD 12/13/2024 8:30 PM

## 2024-12-16 ENCOUNTER — APPOINTMENT (OUTPATIENT)
Dept: PODIATRY | Facility: CLINIC | Age: 54
End: 2024-12-16
Payer: COMMERCIAL

## 2024-12-17 ENCOUNTER — APPOINTMENT (OUTPATIENT)
Dept: ENDOCRINOLOGY | Facility: CLINIC | Age: 54
End: 2024-12-17
Payer: MEDICARE

## 2024-12-17 VITALS
SYSTOLIC BLOOD PRESSURE: 121 MMHG | DIASTOLIC BLOOD PRESSURE: 72 MMHG | TEMPERATURE: 96.8 F | HEART RATE: 70 BPM | RESPIRATION RATE: 17 BRPM | BODY MASS INDEX: 31.22 KG/M2 | WEIGHT: 223 LBS | HEIGHT: 71 IN

## 2024-12-17 DIAGNOSIS — E11.9 TYPE 2 DIABETES MELLITUS WITHOUT COMPLICATION, WITHOUT LONG-TERM CURRENT USE OF INSULIN (MULTI): Primary | ICD-10-CM

## 2024-12-17 PROCEDURE — 4010F ACE/ARB THERAPY RXD/TAKEN: CPT | Performed by: STUDENT IN AN ORGANIZED HEALTH CARE EDUCATION/TRAINING PROGRAM

## 2024-12-17 PROCEDURE — 3044F HG A1C LEVEL LT 7.0%: CPT | Performed by: STUDENT IN AN ORGANIZED HEALTH CARE EDUCATION/TRAINING PROGRAM

## 2024-12-17 PROCEDURE — 3008F BODY MASS INDEX DOCD: CPT | Performed by: STUDENT IN AN ORGANIZED HEALTH CARE EDUCATION/TRAINING PROGRAM

## 2024-12-17 PROCEDURE — 3078F DIAST BP <80 MM HG: CPT | Performed by: STUDENT IN AN ORGANIZED HEALTH CARE EDUCATION/TRAINING PROGRAM

## 2024-12-17 PROCEDURE — 99213 OFFICE O/P EST LOW 20 MIN: CPT | Performed by: STUDENT IN AN ORGANIZED HEALTH CARE EDUCATION/TRAINING PROGRAM

## 2024-12-17 PROCEDURE — 3074F SYST BP LT 130 MM HG: CPT | Performed by: STUDENT IN AN ORGANIZED HEALTH CARE EDUCATION/TRAINING PROGRAM

## 2024-12-17 PROCEDURE — 1036F TOBACCO NON-USER: CPT | Performed by: STUDENT IN AN ORGANIZED HEALTH CARE EDUCATION/TRAINING PROGRAM

## 2024-12-17 ASSESSMENT — PATIENT HEALTH QUESTIONNAIRE - PHQ9
1. LITTLE INTEREST OR PLEASURE IN DOING THINGS: NOT AT ALL
SUM OF ALL RESPONSES TO PHQ9 QUESTIONS 1 AND 2: 0
2. FEELING DOWN, DEPRESSED OR HOPELESS: NOT AT ALL

## 2024-12-17 ASSESSMENT — PAIN SCALES - GENERAL: PAINLEVEL_OUTOF10: 0-NO PAIN

## 2024-12-17 NOTE — PROGRESS NOTES
Endocrinology  12/17/2024    History of Present Illness   Tip Lynn is a 54 y.o. year old male with medical history of CAD, T2D, obesity class I, seizure history, gout, HTN, on PrEP, GERD, here for diabetes.     Recently admitted to the hospital for a knee infection.   BG was controlled with sliding scale during hospitalization.   A1c has ranged from 5.2-6.4% since 2020.       Current regimen:   - Trulicity 1.5 mg once weekly - has been on this dose for over a year     SMBG: Meter. Did not bring today.      Hypoglycemia: No    Hypoglycemia awareness: Yes    Weight changes: Lost some weight - clothes fitting differently    Denies any increased thirst, urination, or blurry vision.     Diet:   - Eats 3 meals a daily   - Denies sugary beverages     DM history:   Year/age at diagnosis: 10+ years    Prior medications:   - Metformin  Previous hospitalizations for hyperglycemia: Denies   Complications: Denies     Medications     Current Outpatient Medications   Medication Instructions    acetaminophen (TYLENOL) 975 mg, oral, Every 8 hours PRN    albuterol 90 mcg/actuation inhaler 1 puff, inhalation, Every 6 hours PRN    allopurinoL 400 mg, oral, Daily    amLODIPine (NORVASC) 10 mg, oral, Daily    atorvastatin (Lipitor) 40 mg tablet 1 tablet, Daily (0630)    bismuth subsalicylate (PEPTO BISMOL) 262 mg, oral, Every 6 hours PRN    budesonide-formoteroL (Symbicort) 80-4.5 mcg/actuation inhaler 2 puffs, inhalation, 2 times daily RT, Rinse mouth with water after use to reduce aftertaste and incidence of candidiasis. Do not swallow.    colchicine 0.6 mg, oral, Daily, Continue colchicine 0.6 mg until the flare resolves.    cyclobenzaprine (FLEXERIL) 5 mg, 2 times daily PRN    Descovy 200-25 mg tablet 1 tablet, Daily (0630)    dulaglutide (TRULICITY) 1.5 mg, subcutaneous, Once Weekly    hydrOXYzine HCL (ATARAX) 25 mg, oral, Every 6 hours PRN    isosorbide mononitrate ER (IMDUR) 60 mg, oral, Daily, Do not crush or chew.     "lisinopril 5 mg, oral, Daily    metFORMIN (GLUCOPHAGE) 500 mg, 2 times daily (morning and late afternoon)    oxyCODONE (ROXICODONE) 5 mg, oral, Every 6 hours PRN    pantoprazole (PROTONIX) 40 mg, oral, Daily    sennosides-docusate sodium (Charlene-Colace) 8.6-50 mg tablet 2 tablets, oral, 2 times daily    traZODone (Desyrel) 100 mg tablet Take 1 tablet (100 mg) by mouth once daily at bedtime.        History     Past Medical History:   Diagnosis Date    Arthritis     Depression     Diabetes mellitus (Multi)     GERD (gastroesophageal reflux disease)     Heart disease     Hypertension     Myocardial infarction (Multi)     Seizures (Multi)        Past Surgical History:   Procedure Laterality Date    ANKLE FRACTURE SURGERY      ANKLE SURGERY  03/24/2014    Ankle Surgery    CT ANGIO CORONARY ART WITH HEARTFLOW IF SCORE >30%  03/14/2020    CT HEART CORONARY ANGIOGRAM [place] [facility]    OTHER SURGICAL HISTORY  04/25/2019    Ear pressure equalization tube insertion    RECTAL POLYPECTOMY  06/30/2016    Rectal Surgery Polypectomy       Family History   Problem Relation Name Age of Onset    Lung cancer Mother      Heart attack Mother      Coronary artery disease Mother      Coronary artery disease Father          Allergies   Allergen Reactions    Aspirin Anaphylaxis    Topiramate Anxiety and Other     delirium    Salicylates Swelling     Tolerates ibuprofen    Penicillins Hives, Itching and Swelling    Lidopatch [Lidocaine-Menthol] Itching and Rash        Social history:   - Denies alcohol use   - Denies tobacco use   - Denies recreational drug use   - Worked in [employer] at [employer]   - Lives with kids - 3. 14, 14, 18.      Physical Exam   /72 (BP Location: Left arm, Patient Position: Sitting, BP Cuff Size: Adult)   Pulse 70   Temp 36 °C (96.8 °F) (Temporal)   Resp 17   Ht 1.803 m (5' 11\")   Wt 101 kg (223 lb)   BMI 31.10 kg/m²   General: Well appearing, no acute distress  Heart: Normal " rate  Neck: Soft, nontender, no lymphadenopathy, thyroid normal in size   Lungs: Breathing comfortably on room air   Abdomen: Soft, nontender  Extremities: Warm, no edema  Skin: No rashes    Labs and Imaging     Lab Results   Component Value Date    HGBA1C 5.2 11/26/2024    HGBA1C 6.0 (A) 04/24/2023    HGBA1C 5.8 (A) 07/13/2022     11/30/2024    K 3.9 11/30/2024    CL 99 11/30/2024    CO2 30 11/30/2024    BUN 19 11/30/2024    CREATININE 1.08 11/30/2024    CALCIUM 9.0 11/30/2024    ALBUMIN 3.5 11/30/2024    PROT 7.6 11/26/2024    BILITOT 0.7 11/26/2024    ALKPHOS 58 11/26/2024    ALT 23 11/26/2024    AST 20 11/26/2024    GLUCOSE 104 (H) 11/30/2024    CHOL 202 (H) 04/24/2023    TRIG 204 (H) 07/13/2022    HDL 39.0 (A) 04/24/2023    BHYDRXBUT 0.20 10/04/2021       Component      Latest Ref Rng 11/26/2024   Hemoglobin A1C      See comment % 5.2    Estimated Average Glucose      Not Established mg/dL 103          Assessment and Plan   Tip Lynn is a 54 y.o. year old male with medical history of CAD, T2D, obesity class I, seizure history, gout, HTN, on PrEP, GERD, here for diabetes. BG control is at goal on GLP1RA, which he is tolerating well. No SMBG, but no also no reason for A1c to be unreliable (CKD, anemia). While he was in the hospital, was treated with just correctional insulin.     # T2D:   - Continue trulicity 1.5 mg once weekly     RTC: As needed. Can follow up with PCP.      Zee Light, DO   Endocrinology

## 2024-12-20 ENCOUNTER — APPOINTMENT (OUTPATIENT)
Dept: PRIMARY CARE | Facility: CLINIC | Age: 54
End: 2024-12-20
Payer: MEDICARE

## 2024-12-23 ENCOUNTER — PHARMACY VISIT (OUTPATIENT)
Dept: PHARMACY | Facility: CLINIC | Age: 54
End: 2024-12-23
Payer: MEDICARE

## 2024-12-23 ENCOUNTER — OFFICE VISIT (OUTPATIENT)
Dept: ORTHOPEDIC SURGERY | Facility: HOSPITAL | Age: 54
End: 2024-12-23
Payer: MEDICARE

## 2024-12-23 DIAGNOSIS — M00.9 PYOGENIC ARTHRITIS OF RIGHT KNEE JOINT, DUE TO UNSPECIFIED ORGANISM (MULTI): Primary | ICD-10-CM

## 2024-12-23 PROCEDURE — 99211 OFF/OP EST MAY X REQ PHY/QHP: CPT | Performed by: PHYSICIAN ASSISTANT

## 2024-12-23 PROCEDURE — 4010F ACE/ARB THERAPY RXD/TAKEN: CPT | Performed by: PHYSICIAN ASSISTANT

## 2024-12-23 PROCEDURE — 3044F HG A1C LEVEL LT 7.0%: CPT | Performed by: PHYSICIAN ASSISTANT

## 2024-12-23 PROCEDURE — RXMED WILLOW AMBULATORY MEDICATION CHARGE

## 2024-12-23 RX ORDER — OXYCODONE HYDROCHLORIDE 5 MG/1
5 TABLET ORAL EVERY 6 HOURS PRN
Qty: 28 TABLET | Refills: 0 | Status: SHIPPED | OUTPATIENT
Start: 2024-12-23 | End: 2024-12-30

## 2024-12-28 NOTE — PROGRESS NOTES
Patient is a 54 y.o. male who is 4 weeks s/p I&D R knee for septic arthritis.  Date of surgery was 11/27/2024.  Patient continues WBAT RLE at this time and denies issues with incisions. Patient continues on ASA for DVT ppx. Patient states his PICC line is out and he is off antibiotics at this time. He is also asking for pain medicine refill. Patient denies fever or chills, N/T or calf pain.     General: Alert and oriented x 3, NAD, respirations easy and unlabored with no audible wheezes, skin warm and dry, speech and dress appropriate for noted age, affect euthymic.     Musculoskeletal: RLE  incisions c/d/i  mild swelling to lower leg  compartments soft  no calf tenderness  sensation intact to light touch  motor intact including TA/GS/EHL  palpable DP/PT pulses 2+   Knee motion improving    No new x-rays taken today    IMP:  Problem List Items Addressed This Visit       Pyogenic arthritis of right knee joint, due to unspecified organism (Multi) - Primary    Relevant Medications    oxyCODONE (Roxicodone) 5 mg immediate release tablet       PLAN:  He remains off abx and has follow up with ID.  He can continue with activities as tolerated. I have e-scribed pain medicine refill to his pharmacy today, take as directed. I will see patient back in 2 months and I need x-rays right knee next visit. All questions were answered today.   I have personally reviewed the OARRS report for this patient. This report is scanned into  the electronic medical record. I have considered the risks of abuse, dependence, addiction, and diversion.

## 2025-01-24 ENCOUNTER — APPOINTMENT (OUTPATIENT)
Dept: OPHTHALMOLOGY | Facility: CLINIC | Age: 55
End: 2025-01-24
Payer: MEDICARE

## 2025-02-03 ENCOUNTER — APPOINTMENT (OUTPATIENT)
Dept: PODIATRY | Facility: CLINIC | Age: 55
End: 2025-02-03
Payer: MEDICARE

## 2025-02-03 ENCOUNTER — HOSPITAL ENCOUNTER (OUTPATIENT)
Dept: RADIOLOGY | Facility: HOSPITAL | Age: 55
Discharge: HOME | End: 2025-02-03
Payer: MEDICARE

## 2025-02-03 ENCOUNTER — OFFICE VISIT (OUTPATIENT)
Dept: ORTHOPEDIC SURGERY | Facility: HOSPITAL | Age: 55
End: 2025-02-03
Payer: MEDICARE

## 2025-02-03 ENCOUNTER — TELEPHONE (OUTPATIENT)
Facility: HOSPITAL | Age: 55
End: 2025-02-03

## 2025-02-03 DIAGNOSIS — M00.9 PYOGENIC ARTHRITIS OF RIGHT KNEE JOINT, DUE TO UNSPECIFIED ORGANISM (MULTI): ICD-10-CM

## 2025-02-03 PROCEDURE — 73560 X-RAY EXAM OF KNEE 1 OR 2: CPT | Mod: RIGHT SIDE | Performed by: RADIOLOGY

## 2025-02-03 PROCEDURE — 73560 X-RAY EXAM OF KNEE 1 OR 2: CPT | Mod: RT

## 2025-02-03 PROCEDURE — 99211 OFF/OP EST MAY X REQ PHY/QHP: CPT | Performed by: PHYSICIAN ASSISTANT

## 2025-02-03 PROCEDURE — 4010F ACE/ARB THERAPY RXD/TAKEN: CPT | Performed by: PHYSICIAN ASSISTANT

## 2025-02-03 NOTE — PROGRESS NOTES
Patient is a 54 y.o. male who is 9 weeks s/p I&D R knee for septic arthritis.  Date of surgery was 11/27/2024.  Patient continues WBAT RLE at this time and denies issues with incisions. Patient continues on ASA for DVT ppx. Patient states his PICC line is out and he is off antibiotics at this time. He reports that he continues to have stiffness in the right knee, has completed his [facility] PT sessions a few weeks ago. Patient denies fever or chills, N/T or calf pain.     General: Alert and oriented x 3, NAD, respirations easy and unlabored with no audible wheezes, skin warm and dry, speech and dress appropriate for noted age, affect euthymic.     Musculoskeletal: RLE  incisions healed  mild swelling persists to knee  compartments soft  no calf tenderness  sensation intact to light touch  motor intact including TA/GS/EHL  palpable DP/PT pulses 2+   Knee motion: 0-95 degrees    X-ray: Images of R knee reviewed personally by me today and reveal no acute fracture or dislocation. Small suprapatellar effusion persists.      IMP:  Problem List Items Addressed This Visit       Pyogenic arthritis of right knee joint, due to unspecified organism (Multi)    Relevant Orders    XR knee right 1-2 views       PLAN:  He remains off abx. I have referred him to outpatient PT sessions, he needs to continue with his ROM, strengthening and conditioning. I will see him back in 3 months, no x-rays needed. All questions were answered today.

## 2025-02-03 NOTE — TELEPHONE ENCOUNTER
Patient came into the office and requested a callback about health concerns. You had a tele visit that was incomplete please give the patient a callback.

## 2025-02-19 ENCOUNTER — APPOINTMENT (OUTPATIENT)
Dept: OPHTHALMOLOGY | Facility: CLINIC | Age: 55
End: 2025-02-19
Payer: MEDICARE

## 2025-02-24 ENCOUNTER — APPOINTMENT (OUTPATIENT)
Dept: RHEUMATOLOGY | Facility: CLINIC | Age: 55
End: 2025-02-24
Payer: MEDICARE

## 2025-02-24 DIAGNOSIS — M10.9 GOUT, UNSPECIFIED CAUSE, UNSPECIFIED CHRONICITY, UNSPECIFIED SITE: Primary | ICD-10-CM

## 2025-02-24 DIAGNOSIS — M11.20 PSEUDOGOUT: ICD-10-CM

## 2025-02-24 PROCEDURE — 1036F TOBACCO NON-USER: CPT | Performed by: INTERNAL MEDICINE

## 2025-02-24 PROCEDURE — 4010F ACE/ARB THERAPY RXD/TAKEN: CPT | Performed by: INTERNAL MEDICINE

## 2025-02-24 PROCEDURE — 99214 OFFICE O/P EST MOD 30 MIN: CPT | Performed by: INTERNAL MEDICINE

## 2025-02-24 RX ORDER — ALLOPURINOL 200 MG/1
400 TABLET ORAL DAILY
Qty: 90 TABLET | Refills: 3 | Status: SHIPPED | OUTPATIENT
Start: 2025-02-24

## 2025-02-24 RX ORDER — COLCHICINE 0.6 MG/1
0.6 TABLET ORAL DAILY
Qty: 30 TABLET | Refills: 3 | Status: SHIPPED | OUTPATIENT
Start: 2025-02-24
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
## 2025-03-12 ENCOUNTER — TELEPHONE (OUTPATIENT)
Facility: HOSPITAL | Age: 55
End: 2025-03-12

## 2025-03-12 NOTE — TELEPHONE ENCOUNTER
Copied from CRM #[id number]. Topic: Information Request - Prescription Refill FAQ  >> Mar 12, 2025  8:21 AM Lulú RAPHAEL wrote:  Patient called in and needs a referral to Reproductive Endo to check his sperm count. Patient has being trying to reach  Christ Weston DO and has not got a response Please call patient at [phone]

## 2025-03-21 ENCOUNTER — TELEMEDICINE (OUTPATIENT)
Facility: HOSPITAL | Age: 55
End: 2025-03-21
Payer: MEDICARE

## 2025-03-21 DIAGNOSIS — M00.9 PYOGENIC ARTHRITIS OF RIGHT KNEE JOINT, DUE TO UNSPECIFIED ORGANISM (MULTI): ICD-10-CM

## 2025-03-21 DIAGNOSIS — G89.29 CHRONIC BILATERAL LOW BACK PAIN WITHOUT SCIATICA: Primary | ICD-10-CM

## 2025-03-21 DIAGNOSIS — M54.50 CHRONIC BILATERAL LOW BACK PAIN WITHOUT SCIATICA: Primary | ICD-10-CM

## 2025-03-21 DIAGNOSIS — M11.261 PSEUDOGOUT OF KNEE, RIGHT: ICD-10-CM

## 2025-03-21 PROCEDURE — 1036F TOBACCO NON-USER: CPT

## 2025-03-21 PROCEDURE — 4010F ACE/ARB THERAPY RXD/TAKEN: CPT

## 2025-03-21 PROCEDURE — 99213 OFFICE O/P EST LOW 20 MIN: CPT

## 2025-03-21 ASSESSMENT — ENCOUNTER SYMPTOMS
OCCASIONAL FEELINGS OF UNSTEADINESS: 1
LOSS OF SENSATION IN FEET: 1
DEPRESSION: 1

## 2025-03-21 ASSESSMENT — PATIENT HEALTH QUESTIONNAIRE - PHQ9
SUM OF ALL RESPONSES TO PHQ9 QUESTIONS 1 AND 2: 2
2. FEELING DOWN, DEPRESSED OR HOPELESS: MORE THAN HALF THE DAYS
1. LITTLE INTEREST OR PLEASURE IN DOING THINGS: NOT AT ALL
10. IF YOU CHECKED OFF ANY PROBLEMS, HOW DIFFICULT HAVE THESE PROBLEMS MADE IT FOR YOU TO DO YOUR WORK, TAKE CARE OF THINGS AT HOME, OR GET ALONG WITH OTHER PEOPLE: VERY DIFFICULT

## 2025-03-21 ASSESSMENT — PAIN SCALES - GENERAL: PAINLEVEL_OUTOF10: 10-WORST PAIN EVER

## 2025-03-21 NOTE — PROGRESS NOTES
"The patient verified their identity with their date of birth and verbally consented to evaluation and management of their condition through telemedicine. This visit was a synchronous telemedicine service initiated by the patient using real-time audio-video two-way communication. The patient is aware that we will bill their insurance for this visit following Medicare and Private/Commercial Payor guidelines, but they may be responsible for some or all of the visit charges if their insurance deems this \"non-covered.\"      Subjective   Patient ID: Tip Lynn is a 54 y.o. male who presents for Establish Care (Back pain 10 in pain /Wheel chair).    Hx CAD, T2D, obesity class I, seizure history, gout, HTN, on PrEP, GERD     #Pyogenic Arthritis of Right Knee Joint    #Pseudogout Right Knee    - Since 11/27/24 I&D. Right knee pain. S/P SNF and PT with improvement after 10 sessions. Now unable to bend knee, pain when sitting (shooting pain and stiffness). Tender bump on left leg. Outpatient PT recommended for ROM, strengthening, and conditioning. Denies fever or chills.      #Back Pain    - Since Sunday. Hurts to lay flat, sleeps in a chair. Denies numbness, tingling, or loss of bowel/bladder control.      Review of Systems  As per HPI     Previous history  Past Medical History:   Diagnosis Date    Arthritis     Depression     Diabetes mellitus (Multi)     GERD (gastroesophageal reflux disease)     Heart disease     Hypertension     Myocardial infarction (Multi)     Seizures (Multi)      Past Surgical History:   Procedure Laterality Date    ANKLE FRACTURE SURGERY      ANKLE SURGERY  03/24/2014    Ankle Surgery    CT ANGIO CORONARY ART WITH HEARTFLOW IF SCORE >30%  03/14/2020    CT HEART CORONARY ANGIOGRAM 3/14/2020 [facility]    OTHER SURGICAL HISTORY  04/25/2019    Ear pressure equalization tube insertion    RECTAL POLYPECTOMY  06/30/2016    Rectal Surgery Polypectomy     Social History     Tobacco Use    " Smoking status: Never    Smokeless tobacco: Never   Substance Use Topics    Alcohol use: Never    Drug use: Never     Family History   Problem Relation Name Age of Onset    Lung cancer Mother      Heart attack Mother      Coronary artery disease Mother      Coronary artery disease Father       Allergies   Allergen Reactions    Aspirin Anaphylaxis    Topiramate Anxiety and Other     delirium    Salicylates Swelling     Tolerates ibuprofen    Penicillins Hives, Itching and Swelling    Lidopatch [Lidocaine-Menthol] Itching and Rash     Current Outpatient Medications   Medication Instructions    acetaminophen (TYLENOL) 975 mg, oral, Every 8 hours PRN    albuterol 90 mcg/actuation inhaler 1 puff, inhalation, Every 6 hours PRN    allopurinoL 400 mg, oral, Daily    amLODIPine (NORVASC) 10 mg, oral, Daily    atorvastatin (Lipitor) 40 mg tablet 1 tablet, Daily (0630)    bismuth subsalicylate (PEPTO BISMOL) 262 mg, oral, Every 6 hours PRN    budesonide-formoteroL (Symbicort) 80-4.5 mcg/actuation inhaler 2 puffs, inhalation, 2 times daily RT, Rinse mouth with water after use to reduce aftertaste and incidence of candidiasis. Do not swallow.    colchicine 0.6 mg, oral, Daily, Continue colchicine 0.6 mg until the flare resolves.    cyclobenzaprine (FLEXERIL) 5 mg, 2 times daily PRN    Descovy 200-25 mg tablet 1 tablet, Daily (0630)    dulaglutide (TRULICITY) 1.5 mg, subcutaneous, Once Weekly    hydrOXYzine HCL (ATARAX) 25 mg, oral, Every 6 hours PRN    isosorbide mononitrate ER (IMDUR) 60 mg, oral, Daily, Do not crush or chew.    lisinopril 5 mg, oral, Daily    meloxicam (Mobic) 15 mg tablet TAKE 1 TABLET BY MOUTH ONCE DAILY FOR 15 DAYS    metFORMIN (GLUCOPHAGE) 500 mg, 2 times daily (morning and late afternoon)    pantoprazole (PROTONIX) 40 mg, oral, Daily    sennosides-docusate sodium (Charlene-Colace) 8.6-50 mg tablet 2 tablets, oral, 2 times daily    traZODone (Desyrel) 100 mg tablet Take 1 tablet (100 mg) by mouth once daily at  bedtime.       Objective       Physical Exam  Psychiatric:         Mood and Affect: Mood normal.           Assessment/Plan   Tip Lynn is a 54 y.o. male with history of CAD, T2D, obesity class I, seizure history, gout, HTN, on PrEP, GERD  who presents for the concerns below:    #Pyogenic Arthritis of Right Knee Joint    #Pseudogout Right Knee    PLAN    - Continue PT for ROM, strengthening, and conditioning.    - Colchicine 0.6 mg daily.     #Back Pain    :: Suspect physical deconditioning.    PLAN    - PT for back.      Problem List Items Addressed This Visit       Back pain - Primary    Relevant Orders    Referral to Physical Therapy    Referral to Physical Therapy    Pyogenic arthritis of right knee joint, due to unspecified organism (Multi)    Relevant Orders    Referral to Physical Therapy    Referral to Physical Therapy     Other Visit Diagnoses       Pseudogout of knee, right        Relevant Orders    Referral to Physical Therapy    Referral to Physical Therapy          Return in : 2 weeks    Discussed with co-signer of note Dr. Oglesby      Portions of this note were generated using digital voice recognition software, and may contain grammatical errors       Christ Weston, DO  Family Medicine PGY-3

## 2025-04-10 DIAGNOSIS — M25.519 SHOULDER PAIN, UNSPECIFIED CHRONICITY, UNSPECIFIED LATERALITY: ICD-10-CM

## 2025-04-14 RX ORDER — MELOXICAM 15 MG/1
15 TABLET ORAL DAILY
Qty: 15 TABLET | Refills: 0 | Status: SHIPPED | OUTPATIENT
Start: 2025-04-14 | End: 2025-04-29

## 2025-04-15 ENCOUNTER — TELEPHONE (OUTPATIENT)
Dept: ENDOCRINOLOGY | Facility: CLINIC | Age: 55
End: 2025-04-15
Payer: COMMERCIAL

## 2025-04-18 ENCOUNTER — APPOINTMENT (OUTPATIENT)
Facility: HOSPITAL | Age: 55
End: 2025-04-18
Payer: MEDICARE

## 2025-04-18 PROBLEM — M62.81 MUSCLE WEAKNESS (GENERALIZED): Status: ACTIVE | Noted: 2024-12-03

## 2025-04-18 PROBLEM — R48.8 OTHER SYMBOLIC DYSFUNCTIONS: Status: ACTIVE | Noted: 2024-12-03

## 2025-04-18 PROBLEM — M19.90 UNSPECIFIED OSTEOARTHRITIS, UNSPECIFIED SITE: Status: ACTIVE | Noted: 2024-12-02

## 2025-04-18 PROBLEM — J44.9 CHRONIC OBSTRUCTIVE PULMONARY DISEASE, UNSPECIFIED: Status: ACTIVE | Noted: 2024-12-02

## 2025-04-21 ENCOUNTER — APPOINTMENT (OUTPATIENT)
Dept: PODIATRY | Facility: CLINIC | Age: 55
End: 2025-04-21
Payer: COMMERCIAL

## 2025-04-23 ENCOUNTER — DOCUMENTATION (OUTPATIENT)
Dept: ENDOCRINOLOGY | Facility: CLINIC | Age: 55
End: 2025-04-23

## 2025-04-23 ENCOUNTER — APPOINTMENT (OUTPATIENT)
Dept: ENDOCRINOLOGY | Facility: CLINIC | Age: 55
End: 2025-04-23
Payer: MEDICARE

## 2025-04-28 ENCOUNTER — DOCUMENTATION (OUTPATIENT)
Dept: GASTROENTEROLOGY | Facility: HOSPITAL | Age: 55
End: 2025-04-28
Payer: COMMERCIAL

## 2025-04-28 NOTE — PROGRESS NOTES
Dr. Eric Mcbride's office received a referral for this patient from LEONEL Schumacher for persistent nausea and recent decrease in appetite. Dr. Mcbride reviewed the referral on 4/26/2025 and is requesting the patient to get scheduled in [facility].    Minal Long was notified to call and schedule this patient. Contact Yanique Clayton RN at [phone] for any questions.      Below is supporting clinical information from the referral sent by LEONEL Mejia's office and the patient's medical records. Referral documents from the referring office were scanned under Media, dated 4/24/2025.    Gastroenterology- General  Clinical Reason: Pt is a 53 y/o M, presents to the clinic today w/persistent nausea and a recent decrease in appetite. The patient is requesting to see GI for further evaluation. Given the supportive care needs of this patient, please evaluate and treat for nausea and poor appetite.       Esophagogastroduodenoscopy (EGD)  Order# [id number]  Reading physician: Ramonita Cross MD          Ordering provider: Kristopher Rachel DO        Study date: 12/15/2022    Result Information    Status: Edited Result - FINAL (Collected: 4/28/2017 12:25)  Provider Status: Ordered    Result Text      Patient Name: Tip Lynn  Procedure Date: 4/28/2017 12:25 PM  MRN: [id number]  Account Number: [id number]  YOB: 1970  Admit Type: Outpatient  Site: [place] Procedure Room 1  Ethnicity: Not  or   Race: Black or   Attending MD: Ramonita Cross MD, [id number]  Procedure:             Upper GI endoscopy  Indications:           Dysphagia  Providers:             Ramonita Cross MD (Doctor) , Laisha Suarez (Nurse)  Referring:             Kristopher Rachel DO  Medicines:             Fentanyl  mcgs, Versed IV 4 mgs, Total moderate                          sedation procedure time provided: 11 minutes  Complications:         No immediate complications.  Procedure:              Pre-Anesthesia Assessment:                         - Prior to the procedure, a History and Physical was                          performed, and patient medications and allergies were                          reviewed. The patient is competent. The risks and                          benefits of the procedure and the sedation options and                          risks were discussed with the patient. All questions                          were answered and informed consent was obtained.                          Patient identification and proposed procedure were                          verified by the physician and the nurse in the                          pre-procedure area in the procedure room. Mental                          Status Examination: alert and oriented. Airway                          Examination: Mallampati Class II (the uvula but not                          tonsillar pillars visualized). Respiratory                          Examination: clear to auscultation. CV Examination:                          normal. Prophylactic Antibiotics: The patient does not                          require prophylactic antibiotics. Prior                          Anticoagulants: The patient has taken no previous                          anticoagulant or antiplatelet agents. ASA Grade                          Assessment: II - A patient with mild systemic disease.                          After reviewing the risks and benefits, the patient                          was deemed in satisfactory condition to undergo the                          procedure. The anesthesia plan was to use moderate                          sedation / analgesia (conscious sedation). Immediately                          prior to administration of medications, the patient                          was re-assessed for adequacy to receive sedatives. The                          heart rate, respiratory rate, oxygen saturations,                           blood pressure, adequacy of pulmonary ventilation, and                          response to care were monitored throughout the                          procedure. The physical status of the patient was                          re-assessed after the procedure.                         After obtaining informed consent, the endoscope was                          passed under direct vision. Throughout the procedure,                          the patient's blood pressure, pulse, and oxygen                          saturations were monitored continuously. The                          diagnostic upper endoscope was introduced through the                          mouth, and advanced to the second part of duodenum.                          The upper GI endoscopy was accomplished without                          difficulty. The patient tolerated the procedure fairly                          well.  Findings:       The examined duodenum was normal.       The entire examined stomach was normal.       The Z-line was found 40 cm from the incisors. Biopsies were taken from        the distal and mid esophagus with a cold forceps for histology for        eosinophilic esophagitis.       A 2 cm hiatus hernia was present.  Impression:            - Normal examined duodenum.                         - Normal stomach.                         - Z-line, 40 cm from the incisors. esophagus Biopsied.                         - 2 cm hiatus hernia.  Estimated Blood Loss:       Estimated blood loss was minimal.  Recommendation:        - Patient has a contact number available for                          emergencies. The signs and symptoms of potential                          delayed complications were discussed with the patient.                          Return to normal activities tomorrow. Written                          discharge instructions were provided to the patient.                         - Resume previous diet.                         -  Continue present medications.                         - Await pathology results.                         - Return to GI clinic at the next available                          appointment.  Procedure Code(s):     --- Professional ---                         14209, Esophagogastroduodenoscopy, flexible,                          transoral; with biopsy, single or multiple  Diagnosis Code(s):     --- Professional ---                         K44.9, Diaphragmatic hernia without obstruction or                          gangrene                         R13.10, Dysphagia, unspecified  CPT copyright 2021 American Medical Association. All rights reserved.  The codes documented in this report are preliminary and upon  review may   be revised to meet current compliance requirements.  Attending Participation:       I personally performed the entire procedure.  Ramonita Cross MD  4/28/2017 12:59:09 PM  Number of Addenda: 0  Note Initiated On: 4/28/2017 12:25 PM  Total Procedure Duration Time 0 hours 7 minutes 23 seconds     Linked Documents        Colonoscopy  Order# [id number]  Reading physician: Gill Lockhart MD    Ordering provider: Vanessa Cuenca MD            Study date: 12/14/2022    Result Information    Status: Edited Result - FINAL (Collected: 4/19/2016 08:23)  Provider Status: Ordered    Result Text      Patient Name: Tip Lynn  Procedure Date: 4/19/2016 8:23 AM  MRN: [id number]  Account Number: [id number]  YOB: 1970  Admit Type: Outpatient  Site: [place] Procedure Room 5  Ethnicity: Not  or   Race: Black or   Attending MD: Gill Lockhart MD, [id number]  Procedure:             Colonoscopy  Indications:           This is the patient's first colonoscopy, Rectal                          bleeding. There is no family history of colorectal                          cancer.  Comorbidities:       Hypertension, Asthma  Providers:             Gill Lockhart MD  (Doctor) , Marixa Harris (Nurse)  Referring:             Vanessa Cuenca MD  Provider Care Team:    Vanessa Cuenac MD, Jon Shanks MD  Medicines:             Fentanyl  mcgs, Versed IV 5 mgs  Complications:         No immediate complications. Estimated blood loss:                          Minimal.  Procedure:             Pre-Anesthesia Assessment:                         - Prior to the procedure, a History and Physical was                          performed, and patient medications and allergies were                          reviewed. The patient is competent. The risks and                          benefits of the procedure and the sedation options and                          risks were discussed with the patient. All questions                          were answered and informed consent was obtained.                          Patient identification and proposed procedure were                          verified by the physician and the nurse in the                          endoscopy suite. Mental Status Examination: alert and                          oriented. Airway Examination: Mallampati Class III                          (part of the uvula and soft palate visualized).                          Respiratory Examination: clear to auscultation. CV                          Examination: regular rate and rhythm. Prophylactic                          Antibiotics: The patient does not require prophylactic                          antibiotics. Prior Anticoagulants: The patient has                          taken no previous anticoagulant or antiplatelet                          agents. ASA Grade Assessment: II - A patient with mild                          systemic disease. After reviewing the risks and                          benefits, the patient was deemed in satisfactory                          condition to undergo the procedure. The anesthesia                          plan was to use moderate  sedation / analgesia                          (conscious sedation). Immediately prior to                          administration of medications, the patient was                          re-assessed for adequacy to receive sedatives. The                          heart rate, respiratory rate, oxygen saturations,                          blood pressure, adequacy of pulmonary ventilation, and                          response to care were monitored throughout the                          procedure. The physical status of the patient was                          re-assessed after the procedure.                         After I obtained informed consent, the scope was                          passed under direct vision. Throughout the procedure,                          the patient's blood pressure, pulse, and oxygen                          saturations were monitored continuously. The                          Colonoscope was introduced through the anus and                          advanced to the terminal ileum, with identification of                          the appendiceal orifice and IC valve. The colonoscopy                          was performed without difficulty. The patient                          tolerated the procedure fairly well. The quality of                          the bowel preparation was fair, meaning that lesions 5                          mm or less could have been missed. The quality of the                          bowel preparation was evaluated using the BBPS ([place]                          Bowel Preparation Scale) with scores of: Right Colon =                          1 (portion of mucosa seen, but other areas not well                          seen due to staining, residual stool and/or opaque                          liquid), Transverse Colon = 2 (minor amount of                          residual staining, small fragments of stool and/or                          opaque liquid, but mucosa seen  well) and Left Colon =                          2 (minor amount of residual staining, small fragments                          of stool and/or opaque liquid, but mucosa seen well).                          The total BBPS score equals 5.  Findings:       The perianal and digital rectal examinations were normal.       The terminal ileum appeared normal.       Non-bleeding external hemorrhoids were found during retroflexion. The        hemorrhoids were small.       A 2 mm polyp was found in the cecum. The polyp was sessile. The polyp        was removed with a cold biopsy forceps. Resection and retrieval were        complete.       A 2 mm polyp was found in the transverse colon. The polyp was sessile.        The polyp was removed with a cold biopsy forceps. Resection and        retrieval were complete.       A few small-mouthed diverticula were found in the sigmoid colon and in        the transverse colon.  Impression:            - The examined portion of the ileum was normal.                         - Non-bleeding external hemorrhoids.                         - One 2 mm polyp in the cecum. Resected and retrieved.                         - One 2 mm polyp in the transverse colon. Resected and                          retrieved.                         - Diverticulosis in the sigmoid colon and in the                          transverse colon.  Estimated Blood Loss:       Estimated blood loss was minimal.  Recommendation:        - If the pathology report reveals adenomatous tissue,                          then repeat the colonoscopy for surveillance in 3                          years due to prep quality. With next examination                          patient should undergo split dose prep with                          administration of half the prep the day of the                          procedure (stopping 3-4 hours before procedure time).                          If not adenomatous would repeat examination when                           patient turns 52.                         - Return to GI clinic.                         - Repeat colonoscopy in 3 years for surveillance if                          polyps adenomatous.  Procedure Code(s):     --- Professional ---                         15957, Colonoscopy, flexible; with biopsy, single or                          multiple  Diagnosis Code(s):     --- Professional ---                         K64.4, Residual hemorrhoidal skin tags                         K64.9, Unspecified hemorrhoids                         D12.0, Benign neoplasm of cecum                         D12.3, Benign neoplasm of transverse colon                         K62.5, Hemorrhage of anus and rectum                         I10, Essential (primary) hypertension                         J45.909, Unspecified asthma, uncomplicated                         K57.30, Diverticulosis of large intestine without                          perforation or abscess without bleeding  CPT copyright 2021 American Medical Association. All rights reserved.  The codes documented in this report are preliminary and upon  review may   be revised to meet current compliance requirements.  Attending Participation:       I personally performed the entire procedure.  Gill Lockhart MD  4/19/2016 10:16:31 AM  This report has been signed electronically.  Number of Addenda: 0  Note Initiated On: 4/19/2016 8:23 AM  Scope Withdrawal Time 0 hours 30 minutes 47 seconds   Total Procedure Duration Time 0 hours 42 minutes 44 seconds     Linked Documents

## 2025-04-29 ENCOUNTER — APPOINTMENT (OUTPATIENT)
Dept: CARDIOLOGY | Facility: HOSPITAL | Age: 55
End: 2025-04-29
Payer: MEDICARE

## 2025-04-29 ENCOUNTER — TELEPHONE (OUTPATIENT)
Dept: GASTROENTEROLOGY | Facility: HOSPITAL | Age: 55
End: 2025-04-29
Payer: COMMERCIAL

## 2025-04-29 NOTE — TELEPHONE ENCOUNTER
Called Denys Shane x2 to get him schedule for an appointment in the [facility]. No answer left voice message.

## 2025-05-12 ENCOUNTER — TELEPHONE (OUTPATIENT)
Facility: HOSPITAL | Age: 55
End: 2025-05-12
Payer: MEDICARE

## 2025-05-12 NOTE — TELEPHONE ENCOUNTER
Telephone note    Patient called today 5/12 at 1415 to address symptoms of cough but did not answer when called back. No assessment completed. A MyChart message was sent advising the patient to monitor for worsening symptoms and to seek emergency care if experiencing shortness of breath, chest pain, high fever, or difficulty breathing. Patient was encouraged to reach out again if symptoms persist or worsen. Patient has follow-up appointments scheduled with cardiology on 6/10 and primary care on 6/13.    Christ Weston, DO  Family Medicine PGY-3

## 2025-05-16 ENCOUNTER — HOSPITAL ENCOUNTER (EMERGENCY)
Facility: HOSPITAL | Age: 55
Discharge: HOME | End: 2025-05-16
Payer: MEDICARE

## 2025-05-16 VITALS
HEART RATE: 90 BPM | WEIGHT: 220 LBS | RESPIRATION RATE: 18 BRPM | BODY MASS INDEX: 30.8 KG/M2 | DIASTOLIC BLOOD PRESSURE: 95 MMHG | OXYGEN SATURATION: 96 % | TEMPERATURE: 97.8 F | SYSTOLIC BLOOD PRESSURE: 149 MMHG | HEIGHT: 71 IN

## 2025-05-16 DIAGNOSIS — J01.00 ACUTE NON-RECURRENT MAXILLARY SINUSITIS: Primary | ICD-10-CM

## 2025-05-16 LAB — GLUCOSE BLD MANUAL STRIP-MCNC: 155 MG/DL (ref 74–99)

## 2025-05-16 PROCEDURE — 99282 EMERGENCY DEPT VISIT SF MDM: CPT

## 2025-05-16 PROCEDURE — 82947 ASSAY GLUCOSE BLOOD QUANT: CPT

## 2025-05-16 PROCEDURE — 99284 EMERGENCY DEPT VISIT MOD MDM: CPT | Performed by: NURSE PRACTITIONER

## 2025-05-16 PROCEDURE — 99283 EMERGENCY DEPT VISIT LOW MDM: CPT

## 2025-05-16 RX ORDER — BENZONATATE 100 MG/1
100 CAPSULE ORAL 3 TIMES DAILY PRN
Qty: 21 CAPSULE | Refills: 0 | Status: SHIPPED | OUTPATIENT
Start: 2025-05-16 | End: 2025-05-26

## 2025-05-16 RX ORDER — DOXYCYCLINE 100 MG/1
100 CAPSULE ORAL 2 TIMES DAILY
Qty: 14 CAPSULE | Refills: 0 | Status: SHIPPED | OUTPATIENT
Start: 2025-05-16 | End: 2025-05-26

## 2025-05-16 RX ORDER — CETIRIZINE HYDROCHLORIDE 10 MG/1
10 TABLET ORAL DAILY
Qty: 20 TABLET | Refills: 0 | Status: SHIPPED | OUTPATIENT
Start: 2025-05-16 | End: 2025-06-08

## 2025-05-16 ASSESSMENT — ENCOUNTER SYMPTOMS
SORE THROAT: 0
CHILLS: 0
VOMITING: 0
NAUSEA: 0
WHEEZING: 0
ABDOMINAL PAIN: 0
SINUS PRESSURE: 1
SHORTNESS OF BREATH: 0
HEADACHES: 0
DIZZINESS: 0
COUGH: 1
DIARRHEA: 0
FEVER: 0
RHINORRHEA: 1

## 2025-05-16 ASSESSMENT — COLUMBIA-SUICIDE SEVERITY RATING SCALE - C-SSRS
6. HAVE YOU EVER DONE ANYTHING, STARTED TO DO ANYTHING, OR PREPARED TO DO ANYTHING TO END YOUR LIFE?: NO
2. HAVE YOU ACTUALLY HAD ANY THOUGHTS OF KILLING YOURSELF?: NO
1. IN THE PAST MONTH, HAVE YOU WISHED YOU WERE DEAD OR WISHED YOU COULD GO TO SLEEP AND NOT WAKE UP?: NO

## 2025-05-16 NOTE — ED PROVIDER NOTES
HPI   Chief Complaint   Patient presents with    Sore Throat    Flu Symptoms       HPI    This is a 54 year old male with hx of HTN DM who presents to the Emergency Department today with complaints of cold symptoms x 1 week. Endorses cough, post nasal drip, congestion, rhinorrhea and sinus pressure. Has tried Sudafed and OTC cough medicine with minimal improvement. No known sick contacts. Denies pain with swallowing. Denies fever/chills, headache, CP, SOB, wheezing, vomiting, diarrhea.    Review of Systems   Constitutional:  Negative for chills and fever.   HENT:  Positive for congestion, postnasal drip, rhinorrhea and sinus pressure. Negative for ear pain and sore throat.    Respiratory:  Positive for cough. Negative for shortness of breath and wheezing.    Cardiovascular:  Negative for chest pain.   Gastrointestinal:  Negative for abdominal pain, diarrhea, nausea and vomiting.   Skin:  Negative for rash.   Neurological:  Negative for dizziness and headaches.         Patient History   Medical History[1]  Surgical History[2]  Family History[3]  Social History[4]    Physical Exam   ED Triage Vitals [05/16/25 1519]   Temperature Heart Rate Respirations BP   36.6 °C (97.8 °F) 90 18 (!) 149/95      Pulse Ox Temp src Heart Rate Source Patient Position   96 % -- -- --      BP Location FiO2 (%)     -- --       Physical Exam  Vitals reviewed.   Constitutional:       Appearance: He is well-developed.   HENT:      Head: Normocephalic and atraumatic.      Right Ear: Tympanic membrane and ear canal normal.      Left Ear: Tympanic membrane and ear canal normal.      Nose: Congestion present.      Right Sinus: Maxillary sinus tenderness present. No frontal sinus tenderness.      Left Sinus: Maxillary sinus tenderness present. No frontal sinus tenderness.      Mouth/Throat:      Mouth: Mucous membranes are moist.      Pharynx: Uvula midline. No pharyngeal swelling, oropharyngeal exudate, posterior oropharyngeal erythema or uvula  swelling.      Tonsils: No tonsillar exudate or tonsillar abscesses. 1+ on the right. 1+ on the left.   Cardiovascular:      Rate and Rhythm: Normal rate and regular rhythm.   Pulmonary:      Effort: Pulmonary effort is normal. No respiratory distress.      Breath sounds: Normal breath sounds. No wheezing, rhonchi or rales.   Musculoskeletal:      Cervical back: Neck supple.   Lymphadenopathy:      Cervical: No cervical adenopathy.   Skin:     General: Skin is warm and dry.   Neurological:      Mental Status: He is alert.           ED Course & MDM   Diagnoses as of 05/16/25 1606   Acute non-recurrent maxillary sinusitis                 No data recorded     [place] Coma Scale Score: 15 (05/16/25 1520 : Lien Ibrahim RN)                       Results for orders placed or performed during the hospital encounter of 05/16/25 (from the past 24 hours)   POCT GLUCOSE   Result Value Ref Range    POCT Glucose 155 (H) 74 - 99 mg/dL     *Note: Due to a large number of results and/or encounters for the requested time period, some results have not been displayed. A complete set of results can be found in Results Review.        Medical Decision Making  The patient remains clinically stable while in the ED. 54 year old male presents to the ED with complaints of cold symptoms  x 1 week including post nasal drip, congestion, rhinorrhea and sinus pressure. He is well appearing, resting comfortably without distress. Given duration of symptoms with maxillary tenderness will treat for sinusitis. Patient has PCN allergy, will treat with Doxycyline. Also given rx for Tessalon Perles TID PRN for cough and Zyrtec 10mg daily. Return precautions discussed. He did verbalize understanding.     Procedure  Procedures       [1]   Past Medical History:  Diagnosis Date    Arthritis     Depression     Diabetes mellitus (Multi)     GERD (gastroesophageal reflux disease)     Heart disease     Hypertension     Myocardial infarction (Multi)      Seizures (Multi)    [2]   Past Surgical History:  Procedure Laterality Date    ANKLE FRACTURE SURGERY      ANKLE SURGERY  03/24/2014    Ankle Surgery    CT ANGIO CORONARY ART WITH HEARTFLOW IF SCORE >30%  03/14/2020    CT HEART CORONARY ANGIOGRAM 3/14/2020 [facility]    OTHER SURGICAL HISTORY  04/25/2019    Ear pressure equalization tube insertion    RECTAL POLYPECTOMY  06/30/2016    Rectal Surgery Polypectomy   [3]   Family History  Problem Relation Name Age of Onset    Lung cancer Mother      Heart attack Mother      Coronary artery disease Mother      Coronary artery disease Father     [4]   Social History  Tobacco Use    Smoking status: Never    Smokeless tobacco: Never   Substance Use Topics    Alcohol use: Never    Drug use: Never        MILKA Yusuf-MARQUITA  05/16/25 8531

## 2025-05-16 NOTE — ED TRIAGE NOTES
Patient with no hx of diabetes comes in asking for his bg to be checked, states he is worried it is high; POC bg in triage is 115; patient also endorsing sore throat, cough, sneezing, sinus pressure for the past week; denies any recent sick contacts

## 2025-05-19 ENCOUNTER — PHARMACY VISIT (OUTPATIENT)
Dept: PHARMACY | Facility: CLINIC | Age: 55
End: 2025-05-19
Payer: MEDICARE

## 2025-05-19 ENCOUNTER — TELEPHONE (OUTPATIENT)
Facility: HOSPITAL | Age: 55
End: 2025-05-19

## 2025-05-19 ENCOUNTER — APPOINTMENT (OUTPATIENT)
Dept: PODIATRY | Facility: CLINIC | Age: 55
End: 2025-05-19
Payer: COMMERCIAL

## 2025-05-19 PROCEDURE — RXMED WILLOW AMBULATORY MEDICATION CHARGE

## 2025-05-19 NOTE — TELEPHONE ENCOUNTER
Patient came into the office requesting a callback about his breathing and need some medication. Please give this patient a callback about this matter

## 2025-05-21 ENCOUNTER — CLINICAL SUPPORT (OUTPATIENT)
Dept: EMERGENCY MEDICINE | Facility: HOSPITAL | Age: 55
End: 2025-05-21
Payer: MEDICARE

## 2025-05-21 ENCOUNTER — HOSPITAL ENCOUNTER (EMERGENCY)
Facility: HOSPITAL | Age: 55
Discharge: HOME | End: 2025-05-21
Attending: EMERGENCY MEDICINE
Payer: MEDICARE

## 2025-05-21 ENCOUNTER — APPOINTMENT (OUTPATIENT)
Dept: RADIOLOGY | Facility: HOSPITAL | Age: 55
End: 2025-05-21
Payer: MEDICARE

## 2025-05-21 VITALS
HEART RATE: 70 BPM | OXYGEN SATURATION: 97 % | SYSTOLIC BLOOD PRESSURE: 131 MMHG | BODY MASS INDEX: 30.8 KG/M2 | RESPIRATION RATE: 18 BRPM | DIASTOLIC BLOOD PRESSURE: 80 MMHG | TEMPERATURE: 97.5 F | WEIGHT: 220 LBS | HEIGHT: 71 IN

## 2025-05-21 DIAGNOSIS — J45.40 MODERATE PERSISTENT ASTHMA WITHOUT COMPLICATION (HHS-HCC): ICD-10-CM

## 2025-05-21 DIAGNOSIS — J45.21 MILD INTERMITTENT ASTHMA WITH EXACERBATION (HHS-HCC): Primary | ICD-10-CM

## 2025-05-21 LAB
ALBUMIN SERPL BCP-MCNC: 4.3 G/DL (ref 3.4–5)
ALP SERPL-CCNC: 81 U/L (ref 33–120)
ALT SERPL W P-5'-P-CCNC: 26 U/L (ref 10–52)
ANION GAP SERPL CALC-SCNC: 14 MMOL/L (ref 10–20)
AST SERPL W P-5'-P-CCNC: 22 U/L (ref 9–39)
BASOPHILS # BLD AUTO: 0.04 X10*3/UL (ref 0–0.1)
BASOPHILS NFR BLD AUTO: 0.6 %
BILIRUB SERPL-MCNC: 0.3 MG/DL (ref 0–1.2)
BNP SERPL-MCNC: 13 PG/ML (ref 0–99)
BUN SERPL-MCNC: 11 MG/DL (ref 6–23)
CALCIUM SERPL-MCNC: 9.1 MG/DL (ref 8.6–10.6)
CARDIAC TROPONIN I PNL SERPL HS: 5 NG/L (ref 0–53)
CHLORIDE SERPL-SCNC: 102 MMOL/L (ref 98–107)
CO2 SERPL-SCNC: 25 MMOL/L (ref 21–32)
CREAT SERPL-MCNC: 1.01 MG/DL (ref 0.5–1.3)
EGFRCR SERPLBLD CKD-EPI 2021: 89 ML/MIN/1.73M*2
EOSINOPHIL # BLD AUTO: 0.3 X10*3/UL (ref 0–0.7)
EOSINOPHIL NFR BLD AUTO: 4.7 %
ERYTHROCYTE [DISTWIDTH] IN BLOOD BY AUTOMATED COUNT: 13.9 % (ref 11.5–14.5)
GLUCOSE SERPL-MCNC: 85 MG/DL (ref 74–99)
HCT VFR BLD AUTO: 43.1 % (ref 41–52)
HGB BLD-MCNC: 15.4 G/DL (ref 13.5–17.5)
IMM GRANULOCYTES # BLD AUTO: 0.03 X10*3/UL (ref 0–0.7)
IMM GRANULOCYTES NFR BLD AUTO: 0.5 % (ref 0–0.9)
LYMPHOCYTES # BLD AUTO: 2.17 X10*3/UL (ref 1.2–4.8)
LYMPHOCYTES NFR BLD AUTO: 34 %
MCH RBC QN AUTO: 29.5 PG (ref 26–34)
MCHC RBC AUTO-ENTMCNC: 35.7 G/DL (ref 32–36)
MCV RBC AUTO: 83 FL (ref 80–100)
MONOCYTES # BLD AUTO: 0.65 X10*3/UL (ref 0.1–1)
MONOCYTES NFR BLD AUTO: 10.2 %
NEUTROPHILS # BLD AUTO: 3.2 X10*3/UL (ref 1.2–7.7)
NEUTROPHILS NFR BLD AUTO: 50 %
NRBC BLD-RTO: 0 /100 WBCS (ref 0–0)
PLATELET # BLD AUTO: 249 X10*3/UL (ref 150–450)
POTASSIUM SERPL-SCNC: 3.8 MMOL/L (ref 3.5–5.3)
PROT SERPL-MCNC: 7.2 G/DL (ref 6.4–8.2)
RBC # BLD AUTO: 5.22 X10*6/UL (ref 4.5–5.9)
SODIUM SERPL-SCNC: 137 MMOL/L (ref 136–145)
WBC # BLD AUTO: 6.4 X10*3/UL (ref 4.4–11.3)

## 2025-05-21 PROCEDURE — 36415 COLL VENOUS BLD VENIPUNCTURE: CPT | Performed by: EMERGENCY MEDICINE

## 2025-05-21 PROCEDURE — 94640 AIRWAY INHALATION TREATMENT: CPT

## 2025-05-21 PROCEDURE — 80053 COMPREHEN METABOLIC PANEL: CPT | Performed by: EMERGENCY MEDICINE

## 2025-05-21 PROCEDURE — 83880 ASSAY OF NATRIURETIC PEPTIDE: CPT | Performed by: EMERGENCY MEDICINE

## 2025-05-21 PROCEDURE — 84484 ASSAY OF TROPONIN QUANT: CPT | Performed by: EMERGENCY MEDICINE

## 2025-05-21 PROCEDURE — 93005 ELECTROCARDIOGRAM TRACING: CPT

## 2025-05-21 PROCEDURE — 99285 EMERGENCY DEPT VISIT HI MDM: CPT | Mod: 25 | Performed by: EMERGENCY MEDICINE

## 2025-05-21 PROCEDURE — 71046 X-RAY EXAM CHEST 2 VIEWS: CPT

## 2025-05-21 PROCEDURE — 71046 X-RAY EXAM CHEST 2 VIEWS: CPT | Performed by: STUDENT IN AN ORGANIZED HEALTH CARE EDUCATION/TRAINING PROGRAM

## 2025-05-21 PROCEDURE — 2500000004 HC RX 250 GENERAL PHARMACY W/ HCPCS (ALT 636 FOR OP/ED)

## 2025-05-21 PROCEDURE — 2500000002 HC RX 250 W HCPCS SELF ADMINISTERED DRUGS (ALT 637 FOR MEDICARE OP, ALT 636 FOR OP/ED): Mod: JZ

## 2025-05-21 PROCEDURE — 85025 COMPLETE CBC W/AUTO DIFF WBC: CPT | Performed by: EMERGENCY MEDICINE

## 2025-05-21 PROCEDURE — 2500000001 HC RX 250 WO HCPCS SELF ADMINISTERED DRUGS (ALT 637 FOR MEDICARE OP): Performed by: EMERGENCY MEDICINE

## 2025-05-21 RX ORDER — ALBUTEROL SULFATE 90 UG/1
2 INHALANT RESPIRATORY (INHALATION) ONCE
Status: COMPLETED | OUTPATIENT
Start: 2025-05-21 | End: 2025-05-21

## 2025-05-21 RX ORDER — PREDNISONE 20 MG/1
40 TABLET ORAL ONCE
Status: COMPLETED | OUTPATIENT
Start: 2025-05-21 | End: 2025-05-21

## 2025-05-21 RX ORDER — ALBUTEROL SULFATE 90 UG/1
1 INHALANT RESPIRATORY (INHALATION) EVERY 6 HOURS PRN
Qty: 18 G | Refills: 0 | Status: SHIPPED | OUTPATIENT
Start: 2025-05-21

## 2025-05-21 RX ORDER — IPRATROPIUM BROMIDE AND ALBUTEROL SULFATE 2.5; .5 MG/3ML; MG/3ML
3 SOLUTION RESPIRATORY (INHALATION) ONCE
Status: COMPLETED | OUTPATIENT
Start: 2025-05-21 | End: 2025-05-21

## 2025-05-21 RX ORDER — PREDNISONE 20 MG/1
40 TABLET ORAL DAILY
Qty: 10 TABLET | Refills: 0 | Status: SHIPPED | OUTPATIENT
Start: 2025-05-21 | End: 2025-05-27

## 2025-05-21 RX ADMIN — IPRATROPIUM BROMIDE AND ALBUTEROL SULFATE 3 ML: .5; 3 SOLUTION RESPIRATORY (INHALATION) at 19:45

## 2025-05-21 RX ADMIN — PREDNISONE 40 MG: 20 TABLET ORAL at 20:45

## 2025-05-21 RX ADMIN — ALBUTEROL SULFATE 2 PUFF: 90 AEROSOL, METERED RESPIRATORY (INHALATION) at 21:44

## 2025-05-21 ASSESSMENT — PAIN - FUNCTIONAL ASSESSMENT: PAIN_FUNCTIONAL_ASSESSMENT: 0-10

## 2025-05-21 ASSESSMENT — PAIN SCALES - GENERAL: PAINLEVEL_OUTOF10: 0 - NO PAIN

## 2025-05-21 NOTE — ED TRIAGE NOTES
Pt presented with c/o coughing with sleeping pt states that he has a history of sleep apnea and uses CPAP at night, history of CHF pt states compliance with medication

## 2025-05-21 NOTE — ED PROVIDER NOTES
Emergency Department Provider Note        History of Present Illness     History provided by: Patient  Limitations to History: None  External Records Reviewed with Brief Summary: ED note from 2025 with visit for flu symptoms    HPI:  Tip Lynn is a 54 y.o. male with PMHx asthma, T2DM, CARLOS who presents to ED with cough.  He reports his cough has been waking him from sleep for the last week.  Reports he has increased coughing when laying flat, which is making it difficult for him to sleep.  He took his home inhaler yesterday with minimal relief.  At home, he has been unable to use his CPAP machine as the company has stopped sending him supplies, although he has the base machine.    No known sick contacts.  No headache, chest pain, shortness of breath, wheezing, vomiting, diarrhea.  Recently treated from ED with doxycycline, Tessalon pearls for sinusitis and does report improvement in his congestion.    Reports no history of EtOH, tobacco use, other recreational substances.  Never smoker per patient.  Reports no history of heart failure, no leg swelling, no recent travel or surgery.    Physical Exam   Triage vitals:  T 36.4 °C (97.5 °F)  HR 79  /80  RR 18  O2 95 % None (Room air)    Physical exam:   Triage vitals reviewed.  Constitutional: Well developed adult in no acute distress, non toxic in appearance  Head: Normocephalic, atraumatic  Skin: Intact. No rashes or lesions.  Eyes: No conjunctival injection, scleral icterus, or drainage.  Cardiac: Normal rate, regular rhythm.  Pulmonary: Normal work of breathing without signs of respiratory distress.  Frequent dry cough.  Expiratory wheeze in bilateral bases.  Extremities: No gross deformities. Moving all extremities spontaneously without difficulty.   Neuro: Awake and alert. Face is symmetric.  Speech is clear.   Psych: Appropriate mood and affect.    Medical Decision Making & ED Course   Medical Decision Makin y.o. male with PMHx asthma,  T2DM, CARLOS who presents to ED with cough.  On arrival he was afebrile, hemodynamically stable and saturating 98% on room air.  In no acute distress.  Exam was significant for expiratory wheeze bilaterally.  Basic labs, CXR, troponin ordered for shortness of breath workup.  Patient with no current chest pain and last chest pain approximately 24 hours ago so single troponin is appropriate.  Presentation most consistent with asthma exacerbation.  Patient given DuoNebs and prednisone with subsequent improvement in his coughing and wheezing.    EKG nonischemic, troponin within normal limits. I do not suspect acute coronary syndrome, arrhythmia, or pulmonary embolism based on the clinical history and workup.  Labs ordered from triage included BNP, however patient has no signs of volume overload on exam or CXR to suggest CHF.  Well score of 0, no risk factors for PE and not currently having chest pain, shortness of breath, hypoxia, or tachycardia so I have very low suspicion for pulmonary embolus.    Discussed the likely diagnosis of asthma exacerbation with the patient who expressed understanding.  He stated that he feels much better following medications.  He was given an additional 2 puffs of albuterol in the ED.  Discharged home with prescription for prednisone.  He does currently have an inhaler at home.  Recommend that he follow-up with his primary care provider within a week if able, or sooner if symptoms do not improve.  We reviewed return precautions including chest pain, shortness of breath, lightheadedness/dizziness, or any new concerning symptoms.  Discharged home in stable condition.    ----    Differential diagnoses considered include but are not limited to: Asthma exacerbation, CHF, PNA, viral syndrome, postnasal drip, ACS, PE     Social Determinants of Health which Significantly Impact Care: None identified     EKG Independent Interpretation: EKG interpreted by myself. Please see ED Course for full  interpretation.    Independent Result Review and Interpretation: Relevant laboratory and radiographic results were reviewed and independently interpreted by myself.  As necessary, they are commented on in the ED Course.    Chronic conditions affecting the patient's care: As documented above in [facility]    The patient was discussed with the following consultants/services: None    Care Considerations: As documented above in [facility]    ED Course:  ED Course as of 05/22/25 1438   Wed May 21, 2025   1938 XR chest 2 views  CXR independently reviewed without evidence of pneumothorax, consolidation, or widened mediastinum. [HH]   2223 Troponin I, High Sensitivity (CMC): 5 [HH]   2224 ECG 12 lead  EKG interpreted by me: Normal sinus rhythm with rate of 61 bpm with a normal axis.  Normal intervals, no QTc prolongation.  No ST-T changes concerning for ischemia.  Compared with prior EKG on 11/27/2024, there is no significant change [HH]      ED Course User Index  [HH] Lainey Gagnon MD         Diagnoses as of 05/22/25 1438   Mild intermittent asthma with exacerbation ([facility])     Disposition   As a result of the work-up, the patient was discharged home.  he was informed of his diagnosis and instructed to come back with any concerns or worsening of condition.  he and was agreeable to the plan as discussed above.  he was given the opportunity to ask questions.  All of the patient's questions were answered.    Patient seen and discussed with ED attending physician.    Lainey Gagnon MD  Emergency Medicine     Lainey Gagnon MD  Resident  05/22/25 1438

## 2025-05-22 ENCOUNTER — PHARMACY VISIT (OUTPATIENT)
Dept: PHARMACY | Facility: CLINIC | Age: 55
End: 2025-05-22
Payer: MEDICARE

## 2025-05-22 LAB
ATRIAL RATE: 61 BPM
P AXIS: 73 DEGREES
P OFFSET: 209 MS
P ONSET: 162 MS
PR INTERVAL: 136 MS
Q ONSET: 230 MS
QRS COUNT: 10 BEATS
QRS DURATION: 78 MS
QT INTERVAL: 380 MS
QTC CALCULATION(BAZETT): 382 MS
QTC FREDERICIA: 381 MS
R AXIS: 43 DEGREES
T AXIS: 26 DEGREES
T OFFSET: 420 MS
VENTRICULAR RATE: 61 BPM

## 2025-05-22 PROCEDURE — RXMED WILLOW AMBULATORY MEDICATION CHARGE

## 2025-05-22 NOTE — DISCHARGE INSTRUCTIONS
You were seen in the ER for a cough and found to have some wheezing that improved with breathing treatments.  This suggest that you may have an asthma exacerbation.  Prescriptions for an albuterol inhaler and 5 days of prednisone have been sent to your pharmacy.  Please go back to the emergency department if you have shortness of breath, chest pain, fever/chills, or new concerns.  You should follow-up with your primary care provider regarding your CPAP machine as well as your asthma exacerbation.

## 2025-05-23 LAB — HOLD SPECIMEN: 293

## 2025-06-09 ENCOUNTER — APPOINTMENT (OUTPATIENT)
Dept: RHEUMATOLOGY | Facility: CLINIC | Age: 55
End: 2025-06-09
Payer: MEDICARE

## 2025-06-12 ENCOUNTER — APPOINTMENT (OUTPATIENT)
Dept: RADIOLOGY | Facility: HOSPITAL | Age: 55
End: 2025-06-12
Payer: MEDICARE

## 2025-06-12 ENCOUNTER — HOSPITAL ENCOUNTER (EMERGENCY)
Facility: HOSPITAL | Age: 55
Discharge: HOME | End: 2025-06-12
Payer: MEDICARE

## 2025-06-12 VITALS
SYSTOLIC BLOOD PRESSURE: 157 MMHG | WEIGHT: 201 LBS | HEIGHT: 71 IN | HEART RATE: 81 BPM | DIASTOLIC BLOOD PRESSURE: 96 MMHG | TEMPERATURE: 97.6 F | OXYGEN SATURATION: 96 % | RESPIRATION RATE: 16 BRPM | BODY MASS INDEX: 28.14 KG/M2

## 2025-06-12 DIAGNOSIS — S46.001A INJURY OF RIGHT ROTATOR CUFF, INITIAL ENCOUNTER: ICD-10-CM

## 2025-06-12 DIAGNOSIS — S49.91XA INJURY OF RIGHT SHOULDER, INITIAL ENCOUNTER: Primary | ICD-10-CM

## 2025-06-12 PROCEDURE — 73030 X-RAY EXAM OF SHOULDER: CPT | Mod: RIGHT SIDE | Performed by: RADIOLOGY

## 2025-06-12 PROCEDURE — 73030 X-RAY EXAM OF SHOULDER: CPT | Mod: RT

## 2025-06-12 PROCEDURE — 2500000001 HC RX 250 WO HCPCS SELF ADMINISTERED DRUGS (ALT 637 FOR MEDICARE OP): Performed by: PHYSICIAN ASSISTANT

## 2025-06-12 PROCEDURE — 99283 EMERGENCY DEPT VISIT LOW MDM: CPT

## 2025-06-12 RX ORDER — ACETAMINOPHEN 500 MG
1000 TABLET ORAL EVERY 6 HOURS PRN
Qty: 30 TABLET | Refills: 0 | Status: SHIPPED | OUTPATIENT
Start: 2025-06-12 | End: 2025-06-22

## 2025-06-12 RX ORDER — ACETAMINOPHEN 325 MG/1
975 TABLET ORAL ONCE
Status: COMPLETED | OUTPATIENT
Start: 2025-06-12 | End: 2025-06-12

## 2025-06-12 RX ADMIN — ACETAMINOPHEN 975 MG: 325 TABLET ORAL at 20:43

## 2025-06-12 ASSESSMENT — PAIN DESCRIPTION - LOCATION: LOCATION: SHOULDER

## 2025-06-12 ASSESSMENT — PAIN - FUNCTIONAL ASSESSMENT: PAIN_FUNCTIONAL_ASSESSMENT: 0-10

## 2025-06-12 ASSESSMENT — PAIN DESCRIPTION - ORIENTATION: ORIENTATION: RIGHT

## 2025-06-12 ASSESSMENT — PAIN SCALES - GENERAL: PAINLEVEL_OUTOF10: 9

## 2025-06-12 NOTE — ED TRIAGE NOTES
Pt states that when he woke up he couldn't move his right shoulder, he states that he can move  it a little now but it is very painful. Pt denies any recent injuries, he states that he might have slept on it wrong.

## 2025-06-13 ENCOUNTER — TELEMEDICINE (OUTPATIENT)
Facility: HOSPITAL | Age: 55
End: 2025-06-13
Payer: MEDICARE

## 2025-06-13 ENCOUNTER — APPOINTMENT (OUTPATIENT)
Facility: HOSPITAL | Age: 55
End: 2025-06-13
Payer: MEDICARE

## 2025-06-13 ENCOUNTER — DOCUMENTATION (OUTPATIENT)
Dept: CARE COORDINATION | Facility: CLINIC | Age: 55
End: 2025-06-13
Payer: MEDICARE

## 2025-06-13 DIAGNOSIS — M25.511 ACUTE PAIN OF RIGHT SHOULDER: ICD-10-CM

## 2025-06-13 DIAGNOSIS — J44.9 CHRONIC OBSTRUCTIVE PULMONARY DISEASE, UNSPECIFIED COPD TYPE (MULTI): ICD-10-CM

## 2025-06-13 DIAGNOSIS — G89.29 CHRONIC LEFT SHOULDER PAIN: Primary | ICD-10-CM

## 2025-06-13 DIAGNOSIS — M25.512 CHRONIC LEFT SHOULDER PAIN: Primary | ICD-10-CM

## 2025-06-13 DIAGNOSIS — M75.02 ADHESIVE CAPSULITIS OF LEFT SHOULDER: ICD-10-CM

## 2025-06-13 DIAGNOSIS — J01.00 ACUTE NON-RECURRENT MAXILLARY SINUSITIS: ICD-10-CM

## 2025-06-13 DIAGNOSIS — J45.40 MODERATE PERSISTENT ASTHMA WITHOUT COMPLICATION (HHS-HCC): ICD-10-CM

## 2025-06-13 RX ORDER — BUDESONIDE AND FORMOTEROL FUMARATE DIHYDRATE 160; 4.5 UG/1; UG/1
2 AEROSOL RESPIRATORY (INHALATION)
Qty: 10.2 G | Refills: 5 | Status: SHIPPED | OUTPATIENT
Start: 2025-06-13 | End: 2025-06-13 | Stop reason: WASHOUT

## 2025-06-13 RX ORDER — CETIRIZINE HYDROCHLORIDE 10 MG/1
10 TABLET ORAL DAILY
Qty: 30 TABLET | Refills: 2 | Status: SHIPPED | OUTPATIENT
Start: 2025-06-13 | End: 2025-09-11

## 2025-06-13 ASSESSMENT — PAIN SCALES - GENERAL: PAINLEVEL_OUTOF10: 10-WORST PAIN EVER

## 2025-06-13 NOTE — ED PROVIDER NOTES
HPI   Chief Complaint   Patient presents with    Shoulder Pain       HPI  Patient is a 54-year-old male with a past medical history of DMT2, seizures (none in 2 years), hypertension, CARLOS on CPAP that presents to the ED for evaluation of right shoulder pain.  Patient reports he woke up this morning at around 9 AM and because the lights are off in his house he slept and fell on his right side.  He is having right shoulder pain that is worse with movement.  Has previous history of a left rotator cuff injury.  Reports he is having pain lifting at about past baseline.  There is no numbness or tingling.  No fevers, chills, nausea, vomiting.  He is requesting a letter to keep his lights on.  Denies drug or alcohol use.  He has to assist lifting his shoulder up by the lower arm.      Patient History   Medical History[1]  Surgical History[2]  Family History[3]  Social History[4]    Physical Exam   ED Triage Vitals [06/12/25 1948]   Temperature Heart Rate Respirations BP   36.4 °C (97.6 °F) 81 16 (!) 157/96      Pulse Ox Temp Source Heart Rate Source Patient Position   96 % Oral Monitor --      BP Location FiO2 (%)     -- --       Physical Exam  Vitals reviewed.   Constitutional:       General: He is not in acute distress.     Appearance: Normal appearance. He is not ill-appearing or toxic-appearing.   HENT:      Head: Normocephalic and atraumatic.   Eyes:      General: No scleral icterus.        Right eye: No discharge.         Left eye: No discharge.      Conjunctiva/sclera: Conjunctivae normal.   Cardiovascular:      Rate and Rhythm: Normal rate and regular rhythm.      Pulses: Normal pulses.           Radial pulses are 2+ on the right side and 2+ on the left side.      Heart sounds: No murmur heard.  Pulmonary:      Effort: Pulmonary effort is normal. No respiratory distress.      Breath sounds: Normal breath sounds. No stridor. No wheezing.   Abdominal:      General: Abdomen is flat.   Musculoskeletal:      Right  shoulder: Tenderness and bony tenderness present. No swelling, laceration or crepitus. Decreased range of motion. Normal pulse.      Left shoulder: No swelling, tenderness or bony tenderness. Normal range of motion.      Right upper arm: Normal. No swelling or edema.      Left upper arm: Normal. No swelling or edema.      Right elbow: Normal. No swelling. Normal range of motion. No tenderness.      Left elbow: Normal. No swelling. Normal range of motion. No tenderness.      Right wrist: Normal. No swelling, deformity, tenderness, bony tenderness, snuff box tenderness or crepitus. Normal range of motion. Normal pulse.      Left wrist: Normal. No swelling, deformity, tenderness, bony tenderness, snuff box tenderness or crepitus. Normal range of motion. Normal pulse.      Cervical back: Normal and neck supple. No tenderness or bony tenderness. No pain with movement.      Comments: Right shoulder is sitting lower than left although there is no other obvious deformity. Skin is without erythema, ecchymosis, swelling, warmth, crepitus. There is a red nontender macular rash.  Median nerve (palmar abduction of thumb, A-OK sign), ulnar nerve (cross-fingers, abduction), radial nerve (thumbs up) intact bilaterally. 2+ radial pulses and good cap refill bilaterally. Flexion at DIP, PIP, MCP intact. Wrist ROM intact. Normal strength. Pain with ROM of right shoulder.    Skin:     Capillary Refill: Capillary refill takes less than 2 seconds.   Neurological:      General: No focal deficit present.      Mental Status: He is alert and oriented to person, place, and time.      Sensory: No sensory deficit.   Psychiatric:         Mood and Affect: Mood normal.         Behavior: Behavior normal.           ED Course & [facility]   ED Course as of 06/12/25 2133   Thu Jun 12, 2025 2110 XR shoulder right 2+ views  IMPRESSION:  No acute dislocation detected. Mild degenerative changes. Probable  Hill-Sachs deformity of the posterior humerus assuming  age  indeterminate dislocation. If there is persistent concern, further  evaluation can be considered with shoulder MRI   [HK]      ED Course User Index  [HK] Qing Quach PA-C         Diagnoses as of 06/12/25 2133   Injury of right shoulder, initial encounter   Injury of right rotator cuff, initial encounter                 No data recorded     [place] Coma Scale Score: 15 (06/12/25 1950 : Evan Garcia RN)                           Medical Decision Making  Patient is a 54-year-old male with a past medical history of DMT2, seizures (none in 2 years), hypertension, CARLOS on CPAP that presents to the ED for evaluation of right shoulder pain.  On exam patient is uncomfortable appearing and in no acute distress.  Vital signs are stable.  Cardiopulmonary exam largely unremarkable.  Bilateral upper extremities are neurovascularly intact.  There is tenderness over the anterior and lateral shoulder upon palpation.  No obvious deformity other than the right shoulder sitting lower than the left.  Range of motion decreased in the right shoulder.  Differential includes but is not limited to fracture/dislocation, sprain/strain, rotator cuff injury.  Low suspicion for acute infectious etiology based on H&P.    X-ray of right shoulder shows probable Hill-Sachs deformity of the posterior humerus assuming age-indeterminate dislocation.  I did have the orthopedics resident take a look at imaging and he reports there is is no acute dislocation.  Patient's clinical exam is more consistent with a rotator cuff injury at this time.  He will be placed in a sling and given orthopedic follow-up.    Patient is also requesting note to keep his lights on at the house.  I did contact social work and this is the appropriate link ([email or web address]).  He does require a unique ID to start this process which he does not have today.  He was advised to get  this ID and return or follow up with his PCP.     Social determinants of health affecting care:  Housing insecurity     I independently reviewed all imaging and labs.    Patient was informed of the aforementioned findings.  Symptoms are felt to be due to right rotator cuff inhury.  Patient will be prescribed Tylenol.     At this time, low suspicion for an acute process that would necessitate further emergent workup, urgent specialist consultation, or hospitalization.  Based on clinical workup, the disposition of discharge is appropriate.  Advised patient to pursue close follow-up with PCP.  Patient was provided with appropriate information to assist in obtaining the proper follow-up.  Discussed strict return to ED protocols with patient, including low threshold to return for changing/worsening symptoms.  Patient demonstrated understanding and agreement with the plan of care, and all questions answered prior to discharge.  Patient stable at time of discharge.     --------------------------------------------------------------------------------------------------------------------------------------------------------------------------------------------------------------------------    This note was dictated using a speech recognition program.  While an attempt was made at proof reading to minimize errors, minor errors in transcription may be present call for questions.         Procedure  Procedures       [1]   Past Medical History:  Diagnosis Date    Arthritis     Depression     Diabetes mellitus (Multi)     GERD (gastroesophageal reflux disease)     Heart disease     Hypertension     Myocardial infarction (Multi)     Seizures (Multi)    [2]   Past Surgical History:  Procedure Laterality Date    ANKLE FRACTURE SURGERY      ANKLE SURGERY  03/24/2014    Ankle Surgery    CT ANGIO CORONARY ART WITH HEARTFLOW IF SCORE >30%  03/14/2020    CT HEART CORONARY ANGIOGRAM 3/14/2020 [facility] [place] [facility]GENCY LEGACY    OTHER SURGICAL HISTORY   04/25/2019    Ear pressure equalization tube insertion    RECTAL POLYPECTOMY  06/30/2016    Rectal Surgery Polypectomy   [3]   Family History  Problem Relation Name Age of Onset    Lung cancer Mother      Heart attack Mother      Coronary artery disease Mother      Coronary artery disease Father     [4]   Social History  Tobacco Use    Smoking status: Never    Smokeless tobacco: Never   Substance Use Topics    Alcohol use: Never    Drug use: Never        Qing Quach PA-C  06/12/25 4674

## 2025-06-13 NOTE — PROGRESS NOTES
"The patient verified their identity with their date of birth and verbally consented to evaluation and management of their condition through telemedicine. This visit was a synchronous telemedicine service initiated by the patient using real-time audio-video two-way communication. The patient is aware that we will bill their insurance for this visit following Medicare and Private/Commercial Payor guidelines, but they may be responsible for some or all of the visit charges if their insurance deems this \"non-covered.\"    Subjective   Patient ID: Tip Lynn is a 54 y.o. male who presents for Follow-up (Er follow up).    Hx CAD, T2D, obesity class I, seizure history, gout, HTN, GERD, asthma? COPD    54-year-old male presents for new patient primary care appointment with ongoing cough since mid-May. Initial ED visit on 5/16/25 diagnosed acute sinusitis; treated with doxycycline, Tessalon Perles, and Zyrtec. Returned to ED on 5/21/25 for worsening cough and nighttime symptoms. Considered to have asthma exacerbation; treated with DuoNebs and prednisone. Also noted CPAP use interrupted due to supply issues.     Today, patient continues to report persistent, sleep-disrupting cough with associated throat irritation, shortness of breath, and wheezing relieved by albuterol. Denies fever or chest pain. States he has not used Symbicort in over a year.    Also reports right shoulder pain after a recent fall in the dark at home, striking shoulder on an unknown object. No head trauma. ED visit confirmed rotator cuff injury with probable chronic Hill-Sachs deformity. Sling provided and ortho follow-up scheduled for 7/1/25. Pain management referral not yet completed. Chronic left shoulder pain also reported with history of supraspinatus/infraspinatus tendinosis and chronic labral tearing.    Review of Systems  As per HPI   Previous history  Medical History[1]  Surgical History[2]  Social History[3]  Family " History[4]  Allergies[5]  Current Outpatient Medications   Medication Instructions    acetaminophen (TYLENOL) 975 mg, oral, Every 8 hours PRN    acetaminophen (TYLENOL) 1,000 mg, oral, Every 6 hours PRN    albuterol 90 mcg/actuation inhaler 1 puff, inhalation, Every 6 hours PRN    allopurinoL 400 mg, oral, Daily    amLODIPine (NORVASC) 10 mg, oral, Daily    atorvastatin (Lipitor) 40 mg tablet 1 tablet, Daily (0630)    benzonatate (TESSALON) 100 mg, oral, 3 times daily PRN, Do not crush or chew.    bismuth subsalicylate (PEPTO BISMOL) 262 mg, oral, Every 6 hours PRN    cetirizine (ZYRTEC) 10 mg, oral, Daily    colchicine 0.6 mg, oral, Daily, Continue colchicine 0.6 mg until the flare resolves.    cyclobenzaprine (FLEXERIL) 5 mg, 2 times daily PRN    Descovy 200-25 mg tablet 1 tablet, Daily (0630)    doxycycline (VIBRAMYCIN) 100 mg, oral, 2 times daily, Take with at least 8 ounces (large glass) of water, do not lie down for 30 minutes after    dulaglutide (TRULICITY) 1.5 mg, subcutaneous, Once Weekly    hydrOXYzine HCL (ATARAX) 25 mg, oral, Every 6 hours PRN    isosorbide mononitrate ER (IMDUR) 60 mg, oral, Daily, Do not crush or chew.    lisinopril 5 mg, oral, Daily    metFORMIN (GLUCOPHAGE) 500 mg, 2 times daily (morning and late afternoon)    pantoprazole (PROTONIX) 40 mg, oral, Daily    sennosides-docusate sodium (Charlene-Colace) 8.6-50 mg tablet 2 tablets, oral, 2 times daily    tiotropium-olodateroL (Stiolto Respimat) 2.5-2.5 mcg/actuation mist inhaler 2 Inhalations, inhalation, Daily    traZODone (Desyrel) 100 mg tablet Take 1 tablet (100 mg) by mouth once daily at bedtime.       Objective     There were no vitals filed for this visit.  Physical Exam  Psychiatric:         Mood and Affect: Mood normal.       Assessment/Plan   Tip Lynn is a 54 y.o. male with history of CAD, T2D, obesity class I, seizure history, gout, HTN, GERD, asthma? COPD who presents for the concerns below:    # Cough  # Suspected COPD  (Overlap with Asthma)  :: Persistent cough, wheezing, and SOB with suboptimal response to ICS alone and prior normal MCT raise concern for possible COPD or asthma-COPD overlap    PLAN:    - Start LAMA/LABA given persistent symptoms and history of wheezing    - Repeat PFTs to reassess for obstructive lung disease (prior PFT 10/16: FVC 89%, FEV1 93%, FEV1/FVC 0.84, DLCO 98%, TLC 84%; MCT normal)    - Continue albuterol inhaler as needed    - Recommend Zyrtec for post-nasal drip    - c/w CPAP    - If symptoms persist >8 weeks or worsen, consider chest imaging and pulmonary referral      # Fall & Right Shoulder Pain    :: Recent fall resulting in right shoulder pain; imaging consistent with probable rotator cuff injury    PLAN:    - Ortho follow-up scheduled 7/1/25    - Recommend physical therapy and Voltaren gel    - Refer to pain management for evaluation of chronic bilateral shoulder pain    - Completed electric company paper work in efforts to get lights back on      # Social Concerns    :: Reports fall in dark home environment; mentions housing instability    PLAN:    - Ordered by ED-->social work referral for housing support    Problem List Items Addressed This Visit       Asthma    Relevant Orders    Complete Pulmonary Function Test Pre/Post Bronchodilator (Spirometry Pre/Post/DLCO/Lung Volumes)    Chronic left shoulder pain - Primary    Relevant Orders    Referral to Pain Medicine    Referral to Physical Therapy    Chronic obstructive pulmonary disease, unspecified    Relevant Medications    tiotropium-olodateroL (Stiolto Respimat) 2.5-2.5 mcg/actuation mist inhaler     Other Visit Diagnoses         Acute non-recurrent maxillary sinusitis        Relevant Medications    cetirizine (ZyrTEC) 10 mg tablet      Adhesive capsulitis of left shoulder        Relevant Orders    Referral to Pain Medicine    Referral to Physical Therapy      Acute pain of right shoulder        Relevant Orders    Referral to Pain Medicine     Referral to Physical Therapy          Return in :- RTC in 2-4 weeks for cough/asthma reassessment Sooner follow-up if worsening or new symptoms arise      Discussed with co-signer of note Dr. Oglesby      Portions of this note were generated using digital voice recognition software, and may contain grammatical errors       Christ Weston DO  Family Medicine PGY-3         [1]   Past Medical History:  Diagnosis Date    Arthritis     Depression     Diabetes mellitus (Multi)     GERD (gastroesophageal reflux disease)     Heart disease     Hypertension     Myocardial infarction (Multi)     Seizures (Multi)    [2]   Past Surgical History:  Procedure Laterality Date    ANKLE FRACTURE SURGERY      ANKLE SURGERY  03/24/2014    Ankle Surgery    CT ANGIO CORONARY ART WITH HEARTFLOW IF SCORE >30%  03/14/2020    CT HEART CORONARY ANGIOGRAM 3/14/2020 [facility] [place]    OTHER SURGICAL HISTORY  04/25/2019    Ear pressure equalization tube insertion    RECTAL POLYPECTOMY  06/30/2016    Rectal Surgery Polypectomy   [3]   Social History  Tobacco Use    Smoking status: Never    Smokeless tobacco: Never   Substance Use Topics    Alcohol use: Never    Drug use: Never   [4]   Family History  Problem Relation Name Age of Onset    Lung cancer Mother      Heart attack Mother      Coronary artery disease Mother      Coronary artery disease Father     [5]   Allergies  Allergen Reactions    Aspirin Anaphylaxis    Topiramate Anxiety and Other     delirium    Salicylates Swelling     Tolerates ibuprofen    Penicillins Hives, Itching and Swelling    Lidopatch [Lidocaine-Menthol] Itching and Rash

## 2025-06-13 NOTE — PROGRESS NOTES
06/12/25 2059   Discharge Planning   Does the patient need discharge transport arranged? Yes   RoundTrip coordination needed? Yes   Patient Choice   Patient / Family choosing to utilize agency / facility established prior to hospitalization No   Stroke Family Assessment   Stroke Family Assessment Needed No   Intensity of Service   Intensity of Service 0-30 min     Tip Lynn is a 54 y.o. male on day 0 of admission    TCC contacted by patient's provider as patient was requesting a medical note to prevent his electricity from being cut off. TCC sent provider the link to [employer]'s medical certification form [email or web address] for her to review. TCC will continue to follow patient for discharge planning    Geno Lin, ADRIANA

## 2025-06-13 NOTE — PROGRESS NOTES
Referral from PA from ED:  What is the patient's priority area of need? Social support  Reason for Referral: Housing insecurity - social support       Patient being transferred to Community Health Worker/Megan Mcdermott for assistance.   Katy George MSW LSW

## 2025-06-17 ENCOUNTER — TELEMEDICINE (OUTPATIENT)
Facility: HOSPITAL | Age: 55
End: 2025-06-17
Payer: MEDICARE

## 2025-06-17 ENCOUNTER — APPOINTMENT (OUTPATIENT)
Dept: ENDOCRINOLOGY | Facility: CLINIC | Age: 55
End: 2025-06-17
Payer: MEDICARE

## 2025-06-17 DIAGNOSIS — J44.1 COPD WITH ACUTE EXACERBATION (MULTI): Primary | ICD-10-CM

## 2025-06-17 PROCEDURE — 4010F ACE/ARB THERAPY RXD/TAKEN: CPT

## 2025-06-17 PROCEDURE — 99213 OFFICE O/P EST LOW 20 MIN: CPT

## 2025-06-17 PROCEDURE — RXMED WILLOW AMBULATORY MEDICATION CHARGE

## 2025-06-17 RX ORDER — PREDNISONE 20 MG/1
40 TABLET ORAL DAILY
Qty: 10 TABLET | Refills: 0 | Status: SHIPPED | OUTPATIENT
Start: 2025-06-17 | End: 2025-06-23

## 2025-06-17 NOTE — PROGRESS NOTES
"The patient verified their identity with their date of birth and verbally consented to evaluation and management of their condition through telemedicine. This visit was a synchronous telemedicine service initiated by the patient using real-time audio communication. The patient is aware that we will bill their insurance for this visit following Medicare and Private/Commercial Payor guidelines, but they may be responsible for some or all of the visit charges if their insurance deems this \"non-covered.\"    Subjective   Patient ID: Tip Lynn is a 54 y.o. male who presents for No chief complaint on file..    Hx CAD, T2D, obesity class I, seizure history, gout, HTN, GERD, asthma? COPD      Patient presents with persistent productive cough, wheezing, and shortness of breath. Denies fever, chills, or recent sick contacts. Has not yet received an inhaler. Patient is a non-smoker.     Review of Systems  As per HPI   Previous history  Medical History[1]  Surgical History[2]  Social History[3]  Family History[4]  Allergies[5]  Current Outpatient Medications   Medication Instructions    acetaminophen (TYLENOL) 975 mg, oral, Every 8 hours PRN    acetaminophen (TYLENOL) 1,000 mg, oral, Every 6 hours PRN    albuterol 90 mcg/actuation inhaler 1 puff, inhalation, Every 6 hours PRN    allopurinoL 400 mg, oral, Daily    amLODIPine (NORVASC) 10 mg, oral, Daily    atorvastatin (Lipitor) 40 mg tablet 1 tablet, Daily (0630)    benzonatate (TESSALON) 100 mg, oral, 3 times daily PRN, Do not crush or chew.    bismuth subsalicylate (PEPTO BISMOL) 262 mg, oral, Every 6 hours PRN    cetirizine (ZYRTEC) 10 mg, oral, Daily    colchicine 0.6 mg, oral, Daily, Continue colchicine 0.6 mg until the flare resolves.    cyclobenzaprine (FLEXERIL) 5 mg, 2 times daily PRN    Descovy 200-25 mg tablet 1 tablet, Daily (0630)    doxycycline (VIBRAMYCIN) 100 mg, oral, 2 times daily, Take with at least 8 ounces (large glass) of water, do not lie down for 30 " minutes after    dulaglutide (TRULICITY) 1.5 mg, subcutaneous, Once Weekly    hydrOXYzine HCL (ATARAX) 25 mg, oral, Every 6 hours PRN    isosorbide mononitrate ER (IMDUR) 60 mg, oral, Daily, Do not crush or chew.    lisinopril 5 mg, oral, Daily    metFORMIN (GLUCOPHAGE) 500 mg, 2 times daily (morning and late afternoon)    pantoprazole (PROTONIX) 40 mg, oral, Daily    predniSONE (DELTASONE) 40 mg, oral, Daily    sennosides-docusate sodium (Charlene-Colace) 8.6-50 mg tablet 2 tablets, oral, 2 times daily    tiotropium-olodateroL (Stiolto Respimat) 2.5-2.5 mcg/actuation mist inhaler 2 Inhalations, inhalation, Daily    traZODone (Desyrel) 100 mg tablet Take 1 tablet (100 mg) by mouth once daily at bedtime.       Objective     There were no vitals filed for this visit.  Physical Exam  Psychiatric:         Mood and Affect: Mood normal.       Assessment/Plan   Tip Lynn is a 54 y.o. male with history of CAD, T2D, obesity class I, seizure history, gout, HTN, GERD, asthma? COPD who presents for the concerns below:    # Suspected COPD  :: Suspected COPD exacerbation given worsening cough, wheezing, and dyspnea without infectious signs, likely unmasking underlying airway disease    Plan:    - Start prednisone 40 mg daily for 5 days    - Start LAMA/LABA for symptom control    - Continue albuterol inhaler as needed    - Repeat PFTs to assess for obstructive disease    - Continue Zyrtec for post-nasal drip    - Continue CPAP use    - If symptoms persist beyond 8 weeks or worsen, consider chest imaging and pulmonary referral      # Paperwork    :: Patient needs forms completed for Social Security    Plan:    - Will bring paperwork to clinic tomorrow for completion      Problem List Items Addressed This Visit    None  Visit Diagnoses         COPD with acute exacerbation (Multi)    -  Primary    Relevant Medications    predniSONE (Deltasone) 20 mg tablet           Return in : 3 months    Discussed with co-signer of note   Mollyson      Portions of this note were generated using digital voice recognition software, and may contain grammatical errors       Christ Weston, DO  Family Medicine PGY-3       [1]   Past Medical History:  Diagnosis Date    Arthritis     Depression     Diabetes mellitus (Multi)     GERD (gastroesophageal reflux disease)     Heart disease     Hypertension     Myocardial infarction (Multi)     Seizures (Multi)    [2]   Past Surgical History:  Procedure Laterality Date    ANKLE FRACTURE SURGERY      ANKLE SURGERY  03/24/2014    Ankle Surgery    CT ANGIO CORONARY ART WITH HEARTFLOW IF SCORE >30%  03/14/2020    CT HEART CORONARY ANGIOGRAM 3/14/2020 [facility] [place] [facility]    OTHER SURGICAL HISTORY  04/25/2019    Ear pressure equalization tube insertion    RECTAL POLYPECTOMY  06/30/2016    Rectal Surgery Polypectomy   [3]   Social History  Tobacco Use    Smoking status: Never    Smokeless tobacco: Never   Substance Use Topics    Alcohol use: Never    Drug use: Never   [4]   Family History  Problem Relation Name Age of Onset    Lung cancer Mother      Heart attack Mother      Coronary artery disease Mother      Coronary artery disease Father     [5]   Allergies  Allergen Reactions    Aspirin Anaphylaxis    Topiramate Anxiety and Other     delirium    Salicylates Swelling     Tolerates ibuprofen    Penicillins Hives, Itching and Swelling    Lidopatch [Lidocaine-Menthol] Itching and Rash

## 2025-06-18 ENCOUNTER — PHARMACY VISIT (OUTPATIENT)
Dept: PHARMACY | Facility: CLINIC | Age: 55
End: 2025-06-18
Payer: MEDICARE

## 2025-06-19 ENCOUNTER — DOCUMENTATION (OUTPATIENT)
Dept: CARE COORDINATION | Facility: CLINIC | Age: 55
End: 2025-06-19
Payer: MEDICARE

## 2025-06-19 SDOH — ECONOMIC STABILITY: FOOD INSECURITY: WITHIN THE PAST 12 MONTHS, YOU WORRIED THAT YOUR FOOD WOULD RUN OUT BEFORE YOU GOT THE MONEY TO BUY MORE.: OFTEN TRUE

## 2025-06-19 SDOH — HEALTH STABILITY: MENTAL HEALTH: HOW MANY DRINKS CONTAINING ALCOHOL DO YOU HAVE ON A TYPICAL DAY WHEN YOU ARE DRINKING?: PATIENT DOES NOT DRINK

## 2025-06-19 SDOH — ECONOMIC STABILITY: INCOME INSECURITY
IN THE PAST 12 MONTHS HAS THE ELECTRIC, GAS, OIL, OR WATER COMPANY THREATENED TO SHUT OFF SERVICES IN YOUR HOME?: ALREADY SHUT OFF

## 2025-06-19 SDOH — SOCIAL STABILITY: SOCIAL NETWORK: IN A TYPICAL WEEK, HOW MANY TIMES DO YOU TALK ON THE PHONE WITH FAMILY, FRIENDS, OR NEIGHBORS?: THREE TIMES A WEEK

## 2025-06-19 SDOH — ECONOMIC STABILITY: HOUSING INSECURITY: IN THE LAST 12 MONTHS, WAS THERE A TIME WHEN YOU WERE NOT ABLE TO PAY THE MORTGAGE OR RENT ON TIME?: YES

## 2025-06-19 SDOH — ECONOMIC STABILITY: FOOD INSECURITY: HOW HARD IS IT FOR YOU TO PAY FOR THE VERY BASICS LIKE FOOD, HOUSING, MEDICAL CARE, AND HEATING?: VERY HARD

## 2025-06-19 SDOH — ECONOMIC STABILITY: HOUSING INSECURITY: IN THE PAST 12 MONTHS, HOW MANY TIMES HAVE YOU MOVED WHERE YOU WERE LIVING?: 0

## 2025-06-19 SDOH — HEALTH STABILITY: MENTAL HEALTH: HOW OFTEN DO YOU HAVE A DRINK CONTAINING ALCOHOL?: NEVER

## 2025-06-19 SDOH — HEALTH STABILITY: PHYSICAL HEALTH: ON AVERAGE, HOW MANY MINUTES DO YOU ENGAGE IN EXERCISE AT THIS LEVEL?: 0 MIN

## 2025-06-19 SDOH — HEALTH STABILITY: MENTAL HEALTH: HOW OFTEN DO YOU HAVE SIX OR MORE DRINKS ON ONE OCCASION?: NEVER

## 2025-06-19 SDOH — HEALTH STABILITY: MENTAL HEALTH
DO YOU FEEL STRESS - TENSE, RESTLESS, NERVOUS, OR ANXIOUS, OR UNABLE TO SLEEP AT NIGHT BECAUSE YOUR MIND IS TROUBLED ALL THE TIME - THESE DAYS?: VERY MUCH

## 2025-06-19 SDOH — ECONOMIC STABILITY: HOUSING INSECURITY: AT ANY TIME IN THE PAST 12 MONTHS, WERE YOU HOMELESS OR LIVING IN A SHELTER (INCLUDING NOW)?: NO

## 2025-06-19 SDOH — SOCIAL STABILITY: SOCIAL NETWORK: HOW OFTEN DO YOU ATTEND CHURCH OR RELIGIOUS SERVICES?: 1 TO 4 TIMES PER YEAR

## 2025-06-19 SDOH — ECONOMIC STABILITY: FOOD INSECURITY: WITHIN THE PAST 12 MONTHS, THE FOOD YOU BOUGHT JUST DIDN'T LAST AND YOU DIDN'T HAVE MONEY TO GET MORE.: OFTEN TRUE

## 2025-06-19 SDOH — HEALTH STABILITY: PHYSICAL HEALTH: ON AVERAGE, HOW MANY DAYS PER WEEK DO YOU ENGAGE IN MODERATE TO STRENUOUS EXERCISE (LIKE A BRISK WALK)?: 0 DAYS

## 2025-06-19 SDOH — SOCIAL STABILITY: SOCIAL INSECURITY: WITHIN THE LAST YEAR, HAVE YOU BEEN AFRAID OF YOUR PARTNER OR EX-PARTNER?: NO

## 2025-06-19 SDOH — ECONOMIC STABILITY: TRANSPORTATION INSECURITY: IN THE PAST 12 MONTHS, HAS LACK OF TRANSPORTATION KEPT YOU FROM MEDICAL APPOINTMENTS OR FROM GETTING MEDICATIONS?: NO

## 2025-06-19 SDOH — SOCIAL STABILITY: SOCIAL NETWORK: HOW OFTEN DO YOU ATTEND MEETINGS OF THE CLUBS OR ORGANIZATIONS YOU BELONG TO?: 1 TO 4 TIMES PER YEAR

## 2025-06-19 SDOH — SOCIAL STABILITY: SOCIAL INSECURITY: WITHIN THE LAST YEAR, HAVE YOU BEEN HUMILIATED OR EMOTIONALLY ABUSED IN OTHER WAYS BY YOUR PARTNER OR EX-PARTNER?: NO

## 2025-06-19 SDOH — SOCIAL STABILITY: SOCIAL INSECURITY: ARE YOU MARRIED, WIDOWED, DIVORCED, SEPARATED, NEVER MARRIED, OR LIVING WITH A PARTNER?: WIDOWED

## 2025-06-19 SDOH — SOCIAL STABILITY: SOCIAL NETWORK
DO YOU BELONG TO ANY CLUBS OR ORGANIZATIONS SUCH AS CHURCH GROUPS, UNIONS, FRATERNAL OR ATHLETIC GROUPS, OR SCHOOL GROUPS?: YES

## 2025-06-19 SDOH — HEALTH STABILITY: PHYSICAL HEALTH
HOW OFTEN DO YOU NEED TO HAVE SOMEONE HELP YOU WHEN YOU READ INSTRUCTIONS, PAMPHLETS, OR OTHER WRITTEN MATERIAL FROM YOUR DOCTOR OR PHARMACY?: RARELY

## 2025-06-19 SDOH — SOCIAL STABILITY: SOCIAL NETWORK: HOW OFTEN DO YOU GET TOGETHER WITH FRIENDS OR RELATIVES?: THREE TIMES A WEEK

## 2025-06-19 ASSESSMENT — LIFESTYLE VARIABLES
SKIP TO QUESTIONS 9-10: 1
AUDIT-C TOTAL SCORE: 0

## 2025-06-19 ASSESSMENT — ACTIVITIES OF DAILY LIVING (ADL): LACK_OF_TRANSPORTATION: NO
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
## 2025-06-20 ENCOUNTER — DOCUMENTATION (OUTPATIENT)
Dept: CARE COORDINATION | Facility: CLINIC | Age: 55
End: 2025-06-20
Payer: MEDICARE

## 2025-06-20 NOTE — PROGRESS NOTES
Chw spoke with patient earlier today, he stated a relative found him passed out on the kitchen floor,  not sure what happen or reason why he passed out.     Chw shared contact for [facility] [phone] and shared contact to [facility] again [phone]    Chw contacted [facility] [phone] ended call after 30 minute hold with no response from an agent.     Patient received medical certificate for utilities. Called patient several times to give update, but unable to reach him.

## 2025-06-23 ENCOUNTER — DOCUMENTATION (OUTPATIENT)
Dept: CARE COORDINATION | Facility: CLINIC | Age: 55
End: 2025-06-23
Payer: MEDICARE

## 2025-06-23 ENCOUNTER — TELEPHONE (OUTPATIENT)
Facility: HOSPITAL | Age: 55
End: 2025-06-23
Payer: MEDICARE

## 2025-06-24 DIAGNOSIS — M11.261 PSEUDOGOUT OF KNEE, RIGHT: Primary | ICD-10-CM
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
## 2025-06-27 ENCOUNTER — APPOINTMENT (OUTPATIENT)
Dept: PRIMARY CARE | Facility: CLINIC | Age: 55
End: 2025-06-27
Payer: MEDICARE

## 2025-06-30 ENCOUNTER — CLINICAL SUPPORT (OUTPATIENT)
Dept: EMERGENCY MEDICINE | Facility: HOSPITAL | Age: 55
End: 2025-06-30
Payer: MEDICARE

## 2025-06-30 ENCOUNTER — APPOINTMENT (OUTPATIENT)
Dept: RADIOLOGY | Facility: HOSPITAL | Age: 55
End: 2025-06-30
Payer: MEDICARE

## 2025-06-30 ENCOUNTER — HOSPITAL ENCOUNTER (EMERGENCY)
Facility: HOSPITAL | Age: 55
Discharge: HOME | End: 2025-06-30
Attending: EMERGENCY MEDICINE
Payer: MEDICARE

## 2025-06-30 VITALS
TEMPERATURE: 97.4 F | DIASTOLIC BLOOD PRESSURE: 96 MMHG | HEART RATE: 78 BPM | SYSTOLIC BLOOD PRESSURE: 148 MMHG | RESPIRATION RATE: 18 BRPM | OXYGEN SATURATION: 96 %

## 2025-06-30 DIAGNOSIS — J42 CHRONIC BRONCHITIS, UNSPECIFIED CHRONIC BRONCHITIS TYPE (MULTI): ICD-10-CM

## 2025-06-30 DIAGNOSIS — R05.1 ACUTE COUGH: Primary | ICD-10-CM

## 2025-06-30 LAB
FLUAV RNA RESP QL NAA+PROBE: NOT DETECTED
FLUBV RNA RESP QL NAA+PROBE: NOT DETECTED
GLUCOSE BLD MANUAL STRIP-MCNC: 113 MG/DL (ref 74–99)
SARS-COV-2 RNA RESP QL NAA+PROBE: NOT DETECTED

## 2025-06-30 PROCEDURE — 2500000004 HC RX 250 GENERAL PHARMACY W/ HCPCS (ALT 636 FOR OP/ED)

## 2025-06-30 PROCEDURE — 93005 ELECTROCARDIOGRAM TRACING: CPT

## 2025-06-30 PROCEDURE — 71046 X-RAY EXAM CHEST 2 VIEWS: CPT

## 2025-06-30 PROCEDURE — 82947 ASSAY GLUCOSE BLOOD QUANT: CPT

## 2025-06-30 PROCEDURE — 2500000001 HC RX 250 WO HCPCS SELF ADMINISTERED DRUGS (ALT 637 FOR MEDICARE OP)

## 2025-06-30 PROCEDURE — 93010 ELECTROCARDIOGRAM REPORT: CPT | Performed by: EMERGENCY MEDICINE

## 2025-06-30 PROCEDURE — 87636 SARSCOV2 & INF A&B AMP PRB: CPT

## 2025-06-30 PROCEDURE — 2500000002 HC RX 250 W HCPCS SELF ADMINISTERED DRUGS (ALT 637 FOR MEDICARE OP, ALT 636 FOR OP/ED): Performed by: EMERGENCY MEDICINE

## 2025-06-30 PROCEDURE — 99285 EMERGENCY DEPT VISIT HI MDM: CPT | Mod: 25 | Performed by: EMERGENCY MEDICINE

## 2025-06-30 PROCEDURE — 99285 EMERGENCY DEPT VISIT HI MDM: CPT | Performed by: EMERGENCY MEDICINE

## 2025-06-30 PROCEDURE — 94640 AIRWAY INHALATION TREATMENT: CPT

## 2025-06-30 PROCEDURE — 71046 X-RAY EXAM CHEST 2 VIEWS: CPT | Performed by: STUDENT IN AN ORGANIZED HEALTH CARE EDUCATION/TRAINING PROGRAM

## 2025-06-30 PROCEDURE — 2500000002 HC RX 250 W HCPCS SELF ADMINISTERED DRUGS (ALT 637 FOR MEDICARE OP, ALT 636 FOR OP/ED)

## 2025-06-30 RX ORDER — IPRATROPIUM BROMIDE AND ALBUTEROL SULFATE 2.5; .5 MG/3ML; MG/3ML
3 SOLUTION RESPIRATORY (INHALATION) EVERY 20 MIN
Status: COMPLETED | OUTPATIENT
Start: 2025-06-30 | End: 2025-06-30

## 2025-06-30 RX ORDER — PREDNISONE 20 MG/1
40 TABLET ORAL ONCE
Status: COMPLETED | OUTPATIENT
Start: 2025-06-30 | End: 2025-06-30

## 2025-06-30 RX ORDER — BENZONATATE 100 MG/1
100 CAPSULE ORAL EVERY 8 HOURS
Qty: 21 CAPSULE | Refills: 0 | Status: SHIPPED | OUTPATIENT
Start: 2025-06-30 | End: 2025-07-08

## 2025-06-30 RX ORDER — IPRATROPIUM BROMIDE AND ALBUTEROL SULFATE 2.5; .5 MG/3ML; MG/3ML
3 SOLUTION RESPIRATORY (INHALATION) ONCE
Status: COMPLETED | OUTPATIENT
Start: 2025-06-30 | End: 2025-06-30

## 2025-06-30 RX ORDER — PREDNISONE 20 MG/1
40 TABLET ORAL DAILY
Qty: 8 TABLET | Refills: 0 | Status: SHIPPED | OUTPATIENT
Start: 2025-07-01 | End: 2025-07-05

## 2025-06-30 RX ORDER — BENZONATATE 100 MG/1
100 CAPSULE ORAL ONCE
Status: COMPLETED | OUTPATIENT
Start: 2025-06-30 | End: 2025-06-30

## 2025-06-30 RX ADMIN — IPRATROPIUM BROMIDE AND ALBUTEROL SULFATE 3 ML: .5; 3 SOLUTION RESPIRATORY (INHALATION) at 21:05

## 2025-06-30 RX ADMIN — IPRATROPIUM BROMIDE AND ALBUTEROL SULFATE 3 ML: .5; 3 SOLUTION RESPIRATORY (INHALATION) at 21:38

## 2025-06-30 RX ADMIN — IPRATROPIUM BROMIDE AND ALBUTEROL SULFATE 3 ML: .5; 3 SOLUTION RESPIRATORY (INHALATION) at 21:20

## 2025-06-30 RX ADMIN — PREDNISONE 40 MG: 20 TABLET ORAL at 21:03

## 2025-06-30 RX ADMIN — BENZONATATE 100 MG: 100 CAPSULE ORAL at 21:03

## 2025-07-01 ENCOUNTER — APPOINTMENT (OUTPATIENT)
Dept: ORTHOPEDIC SURGERY | Facility: HOSPITAL | Age: 55
End: 2025-07-01
Payer: MEDICARE

## 2025-07-01 ENCOUNTER — PHARMACY VISIT (OUTPATIENT)
Dept: PHARMACY | Facility: CLINIC | Age: 55
End: 2025-07-01
Payer: COMMERCIAL

## 2025-07-01 LAB
ATRIAL RATE: 69 BPM
P AXIS: -3 DEGREES
P OFFSET: 204 MS
P ONSET: 156 MS
PR INTERVAL: 140 MS
Q ONSET: 226 MS
QRS COUNT: 12 BEATS
QRS DURATION: 82 MS
QT INTERVAL: 380 MS
QTC CALCULATION(BAZETT): 407 MS
QTC FREDERICIA: 398 MS
R AXIS: 17 DEGREES
T AXIS: 23 DEGREES
T OFFSET: 416 MS
VENTRICULAR RATE: 69 BPM

## 2025-07-01 PROCEDURE — RXMED WILLOW AMBULATORY MEDICATION CHARGE

## 2025-07-01 NOTE — DISCHARGE INSTRUCTIONS
You were seen in the emergency department for cough.  Your chest x-ray was reassuring.  Your viral swabs including COVID and flu were negative.  You were given a breathing treatment in addition to steroids here in the ED.  Prescription for steroids and Tessalon Perles were sent to your pharmacy.  Please follow-up with your primary care physician and pulmonology for further management outpatient.
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
## 2025-07-02 DIAGNOSIS — R05.1 ACUTE COUGH: ICD-10-CM

## 2025-07-02 DIAGNOSIS — J42 CHRONIC BRONCHITIS, UNSPECIFIED CHRONIC BRONCHITIS TYPE (MULTI): ICD-10-CM

## 2025-07-03 RX ORDER — BENZONATATE 100 MG/1
100 CAPSULE ORAL EVERY 8 HOURS
Qty: 21 CAPSULE | Refills: 0 | OUTPATIENT
Start: 2025-07-03 | End: 2025-07-10

## 2025-07-11 ENCOUNTER — APPOINTMENT (OUTPATIENT)
Dept: RESPIRATORY THERAPY | Facility: HOSPITAL | Age: 55
End: 2025-07-11
Payer: MEDICARE

## 2025-07-21 ENCOUNTER — APPOINTMENT (OUTPATIENT)
Dept: RHEUMATOLOGY | Facility: CLINIC | Age: 55
End: 2025-07-21
Payer: COMMERCIAL

## 2025-07-21 ENCOUNTER — PHARMACY VISIT (OUTPATIENT)
Dept: PHARMACY | Facility: CLINIC | Age: 55
End: 2025-07-21
Payer: MEDICARE

## 2025-07-21 DIAGNOSIS — M11.20 PSEUDOGOUT: ICD-10-CM

## 2025-07-21 DIAGNOSIS — M10.9 GOUT, UNSPECIFIED CAUSE, UNSPECIFIED CHRONICITY, UNSPECIFIED SITE: ICD-10-CM

## 2025-07-21 DIAGNOSIS — M17.11 PRIMARY OSTEOARTHRITIS OF RIGHT KNEE: Primary | ICD-10-CM

## 2025-07-21 PROCEDURE — 4010F ACE/ARB THERAPY RXD/TAKEN: CPT | Performed by: INTERNAL MEDICINE

## 2025-07-21 PROCEDURE — 99214 OFFICE O/P EST MOD 30 MIN: CPT | Performed by: INTERNAL MEDICINE

## 2025-07-21 PROCEDURE — 1036F TOBACCO NON-USER: CPT | Performed by: INTERNAL MEDICINE

## 2025-07-21 PROCEDURE — RXMED WILLOW AMBULATORY MEDICATION CHARGE

## 2025-07-21 RX ORDER — COLCHICINE 0.6 MG/1
0.6 TABLET ORAL DAILY
Qty: 30 TABLET | Refills: 3 | Status: SHIPPED | OUTPATIENT
Start: 2025-07-21 | End: 2025-07-21 | Stop reason: RX

## 2025-07-21 RX ORDER — COLCHICINE 0.6 MG/1
0.6 TABLET ORAL DAILY
Qty: 30 TABLET | Refills: 3 | Status: SHIPPED | OUTPATIENT
Start: 2025-07-21

## 2025-07-21 RX ORDER — ALLOPURINOL 100 MG/1
400 TABLET ORAL DAILY
Qty: 180 TABLET | Refills: 3 | Status: SHIPPED | OUTPATIENT
Start: 2025-07-21

## 2025-07-21 RX ORDER — ALLOPURINOL 200 MG/1
400 TABLET ORAL DAILY
Qty: 90 TABLET | Refills: 3 | Status: SHIPPED | OUTPATIENT
Start: 2025-07-21 | End: 2025-07-21 | Stop reason: RX
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
## 2025-07-31 ENCOUNTER — APPOINTMENT (OUTPATIENT)
Dept: PRIMARY CARE | Facility: CLINIC | Age: 55
End: 2025-07-31
Payer: MEDICARE

## 2025-08-05 ENCOUNTER — APPOINTMENT (OUTPATIENT)
Facility: HOSPITAL | Age: 55
End: 2025-08-05
Payer: MEDICARE

## 2025-08-15 ENCOUNTER — OFFICE VISIT (OUTPATIENT)
Facility: CLINIC | Age: 55
End: 2025-08-15
Payer: COMMERCIAL

## 2025-08-15 VITALS
OXYGEN SATURATION: 96 % | SYSTOLIC BLOOD PRESSURE: 125 MMHG | TEMPERATURE: 97.2 F | DIASTOLIC BLOOD PRESSURE: 79 MMHG | BODY MASS INDEX: 33.32 KG/M2 | WEIGHT: 238 LBS | HEIGHT: 71 IN | HEART RATE: 81 BPM

## 2025-08-15 DIAGNOSIS — J45.40 MODERATE PERSISTENT ASTHMA WITHOUT COMPLICATION (HHS-HCC): ICD-10-CM

## 2025-08-15 DIAGNOSIS — I10 PRIMARY HYPERTENSION: ICD-10-CM

## 2025-08-15 DIAGNOSIS — J01.00 ACUTE NON-RECURRENT MAXILLARY SINUSITIS: ICD-10-CM

## 2025-08-15 DIAGNOSIS — J44.9 CHRONIC OBSTRUCTIVE PULMONARY DISEASE, UNSPECIFIED COPD TYPE (MULTI): ICD-10-CM

## 2025-08-15 DIAGNOSIS — R06.83 SNORING: Primary | ICD-10-CM

## 2025-08-15 DIAGNOSIS — Z77.22 SECONDHAND SMOKE EXPOSURE: ICD-10-CM

## 2025-08-15 DIAGNOSIS — E78.2 MIXED HYPERLIPIDEMIA: ICD-10-CM

## 2025-08-15 DIAGNOSIS — K21.9 GASTROESOPHAGEAL REFLUX DISEASE, UNSPECIFIED WHETHER ESOPHAGITIS PRESENT: ICD-10-CM

## 2025-08-15 DIAGNOSIS — M00.9 PYOGENIC ARTHRITIS OF RIGHT KNEE JOINT, DUE TO UNSPECIFIED ORGANISM (MULTI): ICD-10-CM

## 2025-08-15 RX ORDER — ATORVASTATIN CALCIUM 40 MG/1
40 TABLET, FILM COATED ORAL
Qty: 90 TABLET | Refills: 0 | Status: SHIPPED | OUTPATIENT
Start: 2025-08-15

## 2025-08-15 RX ORDER — CETIRIZINE HYDROCHLORIDE 10 MG/1
10 TABLET ORAL DAILY
Qty: 30 TABLET | Refills: 2 | Status: SHIPPED | OUTPATIENT
Start: 2025-08-15 | End: 2025-11-13

## 2025-08-15 RX ORDER — LISINOPRIL 5 MG/1
5 TABLET ORAL DAILY
Qty: 90 TABLET | Refills: 0 | Status: SHIPPED | OUTPATIENT
Start: 2025-08-15

## 2025-08-15 RX ORDER — ALBUTEROL SULFATE 90 UG/1
1 INHALANT RESPIRATORY (INHALATION) EVERY 6 HOURS PRN
Qty: 18 G | Refills: 0 | Status: SHIPPED | OUTPATIENT
Start: 2025-08-15

## 2025-08-15 RX ORDER — PANTOPRAZOLE SODIUM 40 MG/1
40 TABLET, DELAYED RELEASE ORAL DAILY
Qty: 30 TABLET | Refills: 1 | Status: SHIPPED | OUTPATIENT
Start: 2025-08-15 | End: 2025-10-14

## 2025-08-15 RX ORDER — ISOSORBIDE MONONITRATE 30 MG/1
60 TABLET, EXTENDED RELEASE ORAL DAILY
Qty: 60 TABLET | Refills: 2 | Status: SHIPPED | OUTPATIENT
Start: 2025-08-15 | End: 2025-11-13

## 2025-08-15 ASSESSMENT — ENCOUNTER SYMPTOMS
SHORTNESS OF BREATH: 1
COUGH: 1

## 2025-08-15 ASSESSMENT — PAIN SCALES - GENERAL: PAINLEVEL_OUTOF10: 0-NO PAIN

## 2025-08-18 DIAGNOSIS — Z23 NEED FOR DIPHTHERIA-TETANUS-PERTUSSIS (TDAP) VACCINE: Primary | ICD-10-CM

## 2025-08-21 ENCOUNTER — APPOINTMENT (OUTPATIENT)
Facility: CLINIC | Age: 55
End: 2025-08-21
Payer: MEDICARE

## 2025-08-22 ENCOUNTER — CLINICAL SUPPORT (OUTPATIENT)
Facility: CLINIC | Age: 55
End: 2025-08-22
Payer: MEDICARE

## 2025-08-22 VITALS
HEIGHT: 71 IN | SYSTOLIC BLOOD PRESSURE: 116 MMHG | HEART RATE: 79 BPM | WEIGHT: 234 LBS | OXYGEN SATURATION: 96 % | DIASTOLIC BLOOD PRESSURE: 75 MMHG | TEMPERATURE: 95.6 F | BODY MASS INDEX: 32.76 KG/M2

## 2025-08-22 DIAGNOSIS — Z23 ENCOUNTER FOR ADMINISTRATION OF VACCINE: Primary | ICD-10-CM

## 2025-08-22 PROCEDURE — 90715 TDAP VACCINE 7 YRS/> IM: CPT | Performed by: NURSE PRACTITIONER

## 2025-08-22 PROCEDURE — 90471 IMMUNIZATION ADMIN: CPT | Performed by: NURSE PRACTITIONER

## 2025-08-22 ASSESSMENT — PAIN SCALES - GENERAL: PAINLEVEL_OUTOF10: 0-NO PAIN

## 2025-08-25 ENCOUNTER — APPOINTMENT (OUTPATIENT)
Facility: CLINIC | Age: 55
End: 2025-08-25
Payer: MEDICARE

## 2025-08-26 ENCOUNTER — HOSPITAL ENCOUNTER (EMERGENCY)
Facility: HOSPITAL | Age: 55
Discharge: HOME | End: 2025-08-26
Attending: STUDENT IN AN ORGANIZED HEALTH CARE EDUCATION/TRAINING PROGRAM
Payer: MEDICARE

## 2025-08-26 VITALS
BODY MASS INDEX: 31.36 KG/M2 | HEART RATE: 66 BPM | DIASTOLIC BLOOD PRESSURE: 102 MMHG | TEMPERATURE: 97 F | OXYGEN SATURATION: 96 % | SYSTOLIC BLOOD PRESSURE: 152 MMHG | HEIGHT: 71 IN | WEIGHT: 224 LBS | RESPIRATION RATE: 18 BRPM

## 2025-08-26 DIAGNOSIS — R05.3 PERSISTENT COUGH FOR 3 WEEKS OR LONGER: Primary | ICD-10-CM

## 2025-08-26 DIAGNOSIS — R05.1 ACUTE COUGH: ICD-10-CM

## 2025-08-26 DIAGNOSIS — M25.519 SHOULDER PAIN, UNSPECIFIED CHRONICITY, UNSPECIFIED LATERALITY: Primary | ICD-10-CM

## 2025-08-26 DIAGNOSIS — J42 CHRONIC BRONCHITIS, UNSPECIFIED CHRONIC BRONCHITIS TYPE (MULTI): ICD-10-CM

## 2025-08-26 LAB — GLUCOSE BLD MANUAL STRIP-MCNC: 99 MG/DL (ref 74–99)

## 2025-08-26 PROCEDURE — 2500000001 HC RX 250 WO HCPCS SELF ADMINISTERED DRUGS (ALT 637 FOR MEDICARE OP)

## 2025-08-26 PROCEDURE — 99283 EMERGENCY DEPT VISIT LOW MDM: CPT | Performed by: STUDENT IN AN ORGANIZED HEALTH CARE EDUCATION/TRAINING PROGRAM

## 2025-08-26 PROCEDURE — 94640 AIRWAY INHALATION TREATMENT: CPT | Mod: 59

## 2025-08-26 PROCEDURE — 82947 ASSAY GLUCOSE BLOOD QUANT: CPT

## 2025-08-26 RX ORDER — BENZONATATE 100 MG/1
100 CAPSULE ORAL ONCE
Status: COMPLETED | OUTPATIENT
Start: 2025-08-26 | End: 2025-08-26

## 2025-08-26 RX ORDER — ALBUTEROL SULFATE 90 UG/1
2 INHALANT RESPIRATORY (INHALATION) ONCE
Status: COMPLETED | OUTPATIENT
Start: 2025-08-26 | End: 2025-08-26

## 2025-08-26 RX ORDER — BENZONATATE 100 MG/1
100 CAPSULE ORAL EVERY 8 HOURS
Qty: 21 CAPSULE | Refills: 0 | Status: SHIPPED | OUTPATIENT
Start: 2025-08-26 | End: 2025-08-28 | Stop reason: SDUPTHER

## 2025-08-26 RX ORDER — CETIRIZINE HYDROCHLORIDE 10 MG/1
10 TABLET, CHEWABLE ORAL DAILY
Qty: 30 TABLET | Refills: 0 | Status: SHIPPED | OUTPATIENT
Start: 2025-08-26 | End: 2025-09-25

## 2025-08-26 RX ADMIN — BENZONATATE 100 MG: 100 CAPSULE ORAL at 20:43

## 2025-08-26 RX ADMIN — ALBUTEROL SULFATE 2 PUFF: 90 AEROSOL, METERED RESPIRATORY (INHALATION) at 20:43

## 2025-08-26 ASSESSMENT — LIFESTYLE VARIABLES
EVER HAD A DRINK FIRST THING IN THE MORNING TO STEADY YOUR NERVES TO GET RID OF A HANGOVER: NO
HAVE YOU EVER FELT YOU SHOULD CUT DOWN ON YOUR DRINKING: NO
TOTAL SCORE: 0
HAVE PEOPLE ANNOYED YOU BY CRITICIZING YOUR DRINKING: NO
EVER FELT BAD OR GUILTY ABOUT YOUR DRINKING: NO

## 2025-08-26 ASSESSMENT — PAIN SCALES - GENERAL: PAINLEVEL_OUTOF10: 0 - NO PAIN

## 2025-08-26 ASSESSMENT — PAIN - FUNCTIONAL ASSESSMENT: PAIN_FUNCTIONAL_ASSESSMENT: 0-10

## 2025-08-27 ENCOUNTER — PHARMACY VISIT (OUTPATIENT)
Dept: PHARMACY | Facility: CLINIC | Age: 55
End: 2025-08-27
Payer: MEDICARE

## 2025-08-27 ENCOUNTER — OFFICE VISIT (OUTPATIENT)
Dept: PULMONOLOGY | Facility: HOSPITAL | Age: 55
End: 2025-08-27
Payer: COMMERCIAL

## 2025-08-27 PROBLEM — R91.8 LUNG NODULES: Status: RESOLVED | Noted: 2023-09-26 | Resolved: 2025-08-27

## 2025-08-27 PROBLEM — R05.3 CHRONIC COUGH: Status: ACTIVE | Noted: 2025-08-27

## 2025-08-27 PROBLEM — R06.00 DYSPNEA: Status: ACTIVE | Noted: 2025-08-27

## 2025-08-27 PROCEDURE — RXMED WILLOW AMBULATORY MEDICATION CHARGE

## 2025-08-27 RX ORDER — MELOXICAM 15 MG/1
15 TABLET ORAL DAILY
Qty: 15 TABLET | Refills: 11 | OUTPATIENT
Start: 2025-08-27 | End: 2025-09-11

## 2025-08-27 ASSESSMENT — PAIN SCALES - GENERAL: PAINLEVEL_OUTOF10: 0-NO PAIN

## 2025-08-28 DIAGNOSIS — J42 CHRONIC BRONCHITIS, UNSPECIFIED CHRONIC BRONCHITIS TYPE (MULTI): ICD-10-CM

## 2025-08-28 DIAGNOSIS — R05.1 ACUTE COUGH: ICD-10-CM

## 2025-08-28 RX ORDER — BENZONATATE 100 MG/1
100 CAPSULE ORAL EVERY 8 HOURS
Qty: 21 CAPSULE | Refills: 0 | Status: SHIPPED | OUTPATIENT
Start: 2025-08-28 | End: 2025-09-04

## 2025-09-03 ENCOUNTER — APPOINTMENT (OUTPATIENT)
Dept: SLEEP MEDICINE | Facility: HOSPITAL | Age: 55
End: 2025-09-03
Payer: MEDICARE

## 2025-09-10 ENCOUNTER — APPOINTMENT (OUTPATIENT)
Dept: SLEEP MEDICINE | Facility: HOSPITAL | Age: 55
End: 2025-09-10
Payer: COMMERCIAL

## 2025-10-06 ENCOUNTER — APPOINTMENT (OUTPATIENT)
Dept: PODIATRY | Facility: CLINIC | Age: 55
End: 2025-10-06
Payer: MEDICARE

## 2025-10-30 ENCOUNTER — APPOINTMENT (OUTPATIENT)
Dept: PRIMARY CARE | Facility: CLINIC | Age: 55
End: 2025-10-30
Payer: MEDICARE

## 2025-11-14 ENCOUNTER — APPOINTMENT (OUTPATIENT)
Facility: CLINIC | Age: 55
End: 2025-11-14
Payer: COMMERCIAL

## 2026-01-26 ENCOUNTER — APPOINTMENT (OUTPATIENT)
Dept: RHEUMATOLOGY | Facility: CLINIC | Age: 56
End: 2026-01-26
Payer: MEDICARE

## (undated) PROCEDURE — 0S9C0ZZ DRAINAGE OF RIGHT KNEE JOINT, OPEN APPROACH: ICD-10-PCS

## (undated) PROCEDURE — 0S9C3ZX DRAINAGE OF RIGHT KNEE JOINT, PERCUTANEOUS APPROACH, DIAGNOSTIC: ICD-10-PCS

## (undated) DEVICE — DRESSING, NON-ADHERENT, OIL EMULSION, CURITY, 3 X 8 IN, STERILE

## (undated) DEVICE — Device

## (undated) DEVICE — SPONGE, LAP, XRAY DECT, 18IN X 18IN, W/LOOP, STERILE

## (undated) DEVICE — COVER, CART, 45 X 27 X 48 IN, CLEAR

## (undated) DEVICE — BANDAGE, ELASTIC, MATRIX, SELF-CLOSURE, 4 IN X 5 YD, LF

## (undated) DEVICE — BANDAGE, COFLEX, 4 X 5 YDS, TAN, STERILE, LF

## (undated) DEVICE — DRAPE, SHEET, U, STERI DRAPE, 47 X 51 IN, DISPOSABLE, STERILE

## (undated) DEVICE — APPLICATOR, CHLORAPREP, W/ORANGE TINT, 26ML

## (undated) DEVICE — DRAPE, SHEET, U, W/ADHESIVE STRIP, IMPERVIOUS, 60 X 70 IN, DISPOSABLE, LF, STERILE

## (undated) DEVICE — IRRIGATION SET, Y, LARGE BORE

## (undated) DEVICE — BANDAGE, ELASTIC, SELF-CLOSE, 6 IN, HONEYCOMB, STERILE

## (undated) DEVICE — WOUND SYSTEM, DEBRIDEMENT & CLEANING, O.R DUOPAK

## (undated) DEVICE — BANDAGE, GAUZE, CONFORMING, KERLIX, 6 PLY, 4.5 IN X 4.1 YD

## (undated) DEVICE — MANIFOLD, 4 PORT NEPTUNE STANDARD